# Patient Record
Sex: MALE | Race: WHITE | NOT HISPANIC OR LATINO | Employment: OTHER | URBAN - METROPOLITAN AREA
[De-identification: names, ages, dates, MRNs, and addresses within clinical notes are randomized per-mention and may not be internally consistent; named-entity substitution may affect disease eponyms.]

---

## 2017-07-24 ENCOUNTER — OFFICE VISIT (OUTPATIENT)
Dept: UROLOGY | Facility: CLINIC | Age: 77
End: 2017-07-24
Payer: MEDICARE

## 2017-07-24 VITALS
HEIGHT: 66 IN | SYSTOLIC BLOOD PRESSURE: 100 MMHG | DIASTOLIC BLOOD PRESSURE: 68 MMHG | HEART RATE: 70 BPM | BODY MASS INDEX: 34.58 KG/M2 | WEIGHT: 215.19 LBS

## 2017-07-24 DIAGNOSIS — R35.1 NOCTURIA MORE THAN TWICE PER NIGHT: ICD-10-CM

## 2017-07-24 DIAGNOSIS — R33.9 INCOMPLETE EMPTYING OF BLADDER: ICD-10-CM

## 2017-07-24 DIAGNOSIS — N40.1 BPH WITH OBSTRUCTION/LOWER URINARY TRACT SYMPTOMS: Primary | ICD-10-CM

## 2017-07-24 DIAGNOSIS — N13.8 BPH WITH OBSTRUCTION/LOWER URINARY TRACT SYMPTOMS: Primary | ICD-10-CM

## 2017-07-24 PROCEDURE — 99213 OFFICE O/P EST LOW 20 MIN: CPT | Mod: S$GLB,,, | Performed by: UROLOGY

## 2017-07-24 PROCEDURE — 99999 PR PBB SHADOW E&M-EST. PATIENT-LVL III: CPT | Mod: PBBFAC,,, | Performed by: UROLOGY

## 2017-07-24 PROCEDURE — 1126F AMNT PAIN NOTED NONE PRSNT: CPT | Mod: S$GLB,,, | Performed by: UROLOGY

## 2017-07-24 PROCEDURE — 1159F MED LIST DOCD IN RCRD: CPT | Mod: S$GLB,,, | Performed by: UROLOGY

## 2017-07-24 RX ORDER — LOSARTAN POTASSIUM 25 MG/1
25 TABLET ORAL DAILY
COMMUNITY
End: 2018-06-19

## 2017-07-24 RX ORDER — METOPROLOL SUCCINATE 25 MG/1
12.5 TABLET, EXTENDED RELEASE ORAL DAILY
COMMUNITY
Start: 2017-04-30 | End: 2021-02-10

## 2017-07-24 NOTE — PROGRESS NOTES
Subjective:       Patient ID: Geronimo Gu is a 76 y.o. male who was last seen in this office Visit date not found    Chief Complaint:   Chief Complaint   Patient presents with    Benign Prostatic Hypertrophy     annual visit       History of Present Illness  Benign Prostatic Hypertrophy  Patient complains of lower urinary tract symptoms. He reports nocturia one time a night. He denies straining, urgency and weak stream. Patient states symptoms are of mild severity. Onset of symptoms was several years ago and was gradual in onset. He has no personal history and no family history of prostate cancer. He reports a history of no complicating symptoms. He denies flank pain, gross hematuria, kidney stones and recurrent UTI.  He is currently taking Flomax and Proscar.    ACTIVE MEDICAL ISSUES:  There is no problem list on file for this patient.      ALLERGIES AND MEDICATIONS: updated and reviewed.  Review of patient's allergies indicates:   Allergen Reactions    Pcn [penicillins]      Current Outpatient Prescriptions   Medication Sig    aspirin (ECOTRIN) 81 MG EC tablet Take 81 mg by mouth once daily.    co-enzyme Q-10 30 mg capsule Take 30 mg by mouth 3 (three) times daily.    finasteride (PROSCAR) 5 mg tablet TAKE 1 TABLET ONE TIME DAILY    losartan (COZAAR) 25 MG tablet Take 25 mg by mouth once daily.    metoprolol succinate (TOPROL-XL) 25 MG 24 hr tablet     ranitidine (ZANTAC) 300 MG tablet Take 300 mg by mouth every evening.    tamsulosin (FLOMAX) 0.4 mg Cp24 TAKE 1 CAPSULE ONE TIME DAILY     No current facility-administered medications for this visit.        Review of Systems   Constitutional: Negative for activity change, fatigue, fever and unexpected weight change.   HENT: Negative for congestion.    Eyes: Negative for redness.   Respiratory: Negative for chest tightness and shortness of breath.    Cardiovascular: Negative for chest pain and leg swelling.   Gastrointestinal: Negative for abdominal  "pain, constipation, diarrhea, nausea and vomiting.   Genitourinary: Negative for dysuria, flank pain, frequency, hematuria, penile pain, penile swelling, scrotal swelling, testicular pain and urgency.   Musculoskeletal: Negative for arthralgias and back pain.   Neurological: Negative for dizziness and light-headedness.   Psychiatric/Behavioral: Negative for behavioral problems and confusion. The patient is not nervous/anxious.    All other systems reviewed and are negative.      Objective:      Vitals:    07/24/17 1347   BP: 100/68   Pulse: 70   Weight: 97.6 kg (215 lb 2.7 oz)   Height: 5' 6" (1.676 m)     Physical Exam   Nursing note and vitals reviewed.  Constitutional: He is oriented to person, place, and time. He appears well-developed and well-nourished.   HENT:   Head: Normocephalic.   Eyes: Conjunctivae are normal.   Neck: Normal range of motion. Neck supple. No tracheal deviation present. No thyromegaly present.   Cardiovascular: Normal rate and normal heart sounds.    Pulmonary/Chest: Effort normal and breath sounds normal. No respiratory distress. He has no wheezes.   Abdominal: Soft. Bowel sounds are normal. He exhibits no distension and no mass. There is no hepatosplenomegaly. There is no tenderness. There is no rebound, no guarding and no CVA tenderness. No hernia. Hernia confirmed negative in the right inguinal area and confirmed negative in the left inguinal area.   Genitourinary: Rectum normal, testes normal and penis normal. Rectal exam shows no external hemorrhoid, no mass and no tenderness. Prostate is enlarged. Prostate is not tender. Right testis shows no mass and no tenderness. Left testis shows no mass and no tenderness.       Musculoskeletal: Normal range of motion. He exhibits no edema or tenderness.   Lymphadenopathy:     He has no cervical adenopathy. No inguinal adenopathy noted on the right or left side.   Neurological: He is alert and oriented to person, place, and time.   Skin: Skin is " warm and dry. No rash noted. No erythema.     Psychiatric: He has a normal mood and affect. His behavior is normal. Judgment and thought content normal.       Urine dipstick shows negative for all components.  Micro exam: negative for WBC's or RBC's.    Assessment:       1. BPH with obstruction/lower urinary tract symptoms    2. Nocturia more than twice per night    3. Incomplete emptying of bladder          Plan:       1. BPH with obstruction/lower urinary tract symptoms  Stay on Flomax and Proscar.  He wants to continue prostate cancer screening.    - Prostate Specific Antigen, Diagnostic; Future  - Prostate Specific Antigen, Diagnostic    2. Nocturia more than twice per night      3. Incomplete emptying of bladder              Return in about 1 year (around 7/24/2018) for Follow up.

## 2017-08-01 ENCOUNTER — HOSPITAL ENCOUNTER (EMERGENCY)
Facility: OTHER | Age: 77
Discharge: HOME | End: 2017-08-01
Attending: EMERGENCY MEDICINE
Payer: MEDICARE

## 2017-08-01 ENCOUNTER — OFFICE VISIT (OUTPATIENT)
Dept: UROLOGY | Facility: CLINIC | Age: 77
End: 2017-08-01
Payer: MEDICARE

## 2017-08-01 VITALS
DIASTOLIC BLOOD PRESSURE: 72 MMHG | WEIGHT: 215.19 LBS | HEIGHT: 66 IN | BODY MASS INDEX: 34.58 KG/M2 | SYSTOLIC BLOOD PRESSURE: 138 MMHG

## 2017-08-01 VITALS
SYSTOLIC BLOOD PRESSURE: 154 MMHG | TEMPERATURE: 98 F | HEART RATE: 74 BPM | BODY MASS INDEX: 34.74 KG/M2 | OXYGEN SATURATION: 98 % | HEIGHT: 66 IN | WEIGHT: 216.19 LBS | DIASTOLIC BLOOD PRESSURE: 76 MMHG | RESPIRATION RATE: 16 BRPM

## 2017-08-01 DIAGNOSIS — N20.0 KIDNEY STONE ON LEFT SIDE: Primary | ICD-10-CM

## 2017-08-01 DIAGNOSIS — N28.9 RENAL INSUFFICIENCY: ICD-10-CM

## 2017-08-01 DIAGNOSIS — K59.00 CONSTIPATION, UNSPECIFIED CONSTIPATION TYPE: ICD-10-CM

## 2017-08-01 DIAGNOSIS — N23 RENAL COLIC ON LEFT SIDE: ICD-10-CM

## 2017-08-01 DIAGNOSIS — N20.1 URETERAL STONE: Primary | ICD-10-CM

## 2017-08-01 DIAGNOSIS — Q62.11 HYDRONEPHROSIS WITH URETEROPELVIC JUNCTION (UPJ) OBSTRUCTION: ICD-10-CM

## 2017-08-01 LAB
ALBUMIN SERPL-MCNC: 3.7 G/DL (ref 3.3–5.5)
ALBUMIN SERPL-MCNC: 4.1 G/DL (ref 3.3–5.5)
ALP SERPL-CCNC: 58 U/L (ref 42–141)
ALP SERPL-CCNC: 66 U/L (ref 42–141)
BILIRUB SERPL-MCNC: 0.9 MG/DL (ref 0.2–1.6)
BILIRUB SERPL-MCNC: 0.9 MG/DL (ref 0.2–1.6)
BILIRUB SERPL-MCNC: NORMAL MG/DL
BILIRUBIN, POC UA: NEGATIVE
BLOOD URINE, POC: 250
BLOOD, POC UA: ABNORMAL
BUN SERPL-MCNC: 19 MG/DL (ref 7–22)
CALCIUM SERPL-MCNC: 9.1 MG/DL (ref 8–10.3)
CHLORIDE SERPL-SCNC: 105 MMOL/L (ref 98–108)
CLARITY, POC UA: CLEAR
COLOR, POC UA: YELLOW
COLOR, POC UA: YELLOW
CREAT SERPL-MCNC: 1.6 MG/DL (ref 0.6–1.2)
GLUCOSE SERPL-MCNC: 106 MG/DL (ref 73–118)
GLUCOSE UR QL STRIP: NORMAL
GLUCOSE, POC UA: NEGATIVE
KETONES UR QL STRIP: NORMAL
KETONES, POC UA: NEGATIVE
LEUKOCYTE EST, POC UA: NEGATIVE
LEUKOCYTE ESTERASE URINE, POC: NORMAL
NITRITE, POC UA: NEGATIVE
NITRITE, POC UA: NORMAL
PH UR STRIP: 6 [PH]
PH, POC UA: NORMAL
POC ALT (SGPT): 18 U/L (ref 10–47)
POC ALT (SGPT): 20 U/L (ref 10–47)
POC AMYLASE: 76 U/L (ref 14–97)
POC AST (SGOT): 35 U/L (ref 11–38)
POC AST (SGOT): 36 U/L (ref 11–38)
POC GGT: 16 U/L (ref 5–65)
POC TCO2: 24 MMOL/L (ref 18–33)
POTASSIUM BLD-SCNC: 4.4 MMOL/L (ref 3.6–5.1)
PROTEIN, POC UA: ABNORMAL
PROTEIN, POC: 6.9 G/DL (ref 6.4–8.1)
PROTEIN, POC: 6.9 G/DL (ref 6.4–8.1)
PROTEIN, POC: NORMAL
SODIUM BLD-SCNC: 139 MMOL/L (ref 128–145)
SPECIFIC GRAVITY, POC UA: >=1.03
SPECIFIC GRAVITY, POC UA: NORMAL
UROBILINOGEN, POC UA: 0.2 E.U./DL
UROBILINOGEN, POC UA: NORMAL

## 2017-08-01 PROCEDURE — 63600175 PHARM REV CODE 636 W HCPCS: Performed by: EMERGENCY MEDICINE

## 2017-08-01 PROCEDURE — 99999 PR PBB SHADOW E&M-EST. PATIENT-LVL III: CPT | Mod: PBBFAC,,, | Performed by: NURSE PRACTITIONER

## 2017-08-01 PROCEDURE — 99284 EMERGENCY DEPT VISIT MOD MDM: CPT | Mod: 25

## 2017-08-01 PROCEDURE — 81001 URINALYSIS AUTO W/SCOPE: CPT | Mod: S$GLB,,, | Performed by: NURSE PRACTITIONER

## 2017-08-01 PROCEDURE — 96372 THER/PROPH/DIAG INJ SC/IM: CPT

## 2017-08-01 PROCEDURE — 99214 OFFICE O/P EST MOD 30 MIN: CPT | Mod: 25,S$GLB,, | Performed by: NURSE PRACTITIONER

## 2017-08-01 PROCEDURE — 96375 TX/PRO/DX INJ NEW DRUG ADDON: CPT

## 2017-08-01 PROCEDURE — 25000003 PHARM REV CODE 250: Performed by: EMERGENCY MEDICINE

## 2017-08-01 PROCEDURE — 87086 URINE CULTURE/COLONY COUNT: CPT

## 2017-08-01 PROCEDURE — 96374 THER/PROPH/DIAG INJ IV PUSH: CPT

## 2017-08-01 PROCEDURE — 80053 COMPREHEN METABOLIC PANEL: CPT

## 2017-08-01 PROCEDURE — 81003 URINALYSIS AUTO W/O SCOPE: CPT

## 2017-08-01 PROCEDURE — 85025 COMPLETE CBC W/AUTO DIFF WBC: CPT

## 2017-08-01 PROCEDURE — 96361 HYDRATE IV INFUSION ADD-ON: CPT

## 2017-08-01 PROCEDURE — 1159F MED LIST DOCD IN RCRD: CPT | Mod: S$GLB,,, | Performed by: NURSE PRACTITIONER

## 2017-08-01 PROCEDURE — 82150 ASSAY OF AMYLASE: CPT

## 2017-08-01 RX ORDER — CIPROFLOXACIN 2 MG/ML
400 INJECTION, SOLUTION INTRAVENOUS
Status: CANCELLED | OUTPATIENT
Start: 2017-08-01

## 2017-08-01 RX ORDER — DICYCLOMINE HYDROCHLORIDE 10 MG/ML
20 INJECTION INTRAMUSCULAR
Status: COMPLETED | OUTPATIENT
Start: 2017-08-01 | End: 2017-08-01

## 2017-08-01 RX ORDER — HYDROMORPHONE HYDROCHLORIDE 2 MG/ML
0.5 INJECTION, SOLUTION INTRAMUSCULAR; INTRAVENOUS; SUBCUTANEOUS
Status: COMPLETED | OUTPATIENT
Start: 2017-08-01 | End: 2017-08-01

## 2017-08-01 RX ORDER — AMLODIPINE BESYLATE 5 MG/1
TABLET ORAL
COMMUNITY
Start: 2017-04-30 | End: 2017-08-03

## 2017-08-01 RX ORDER — ONDANSETRON 2 MG/ML
4 INJECTION INTRAMUSCULAR; INTRAVENOUS
Status: COMPLETED | OUTPATIENT
Start: 2017-08-01 | End: 2017-08-01

## 2017-08-01 RX ORDER — TAMSULOSIN HYDROCHLORIDE 0.4 MG/1
0.4 CAPSULE ORAL
Status: COMPLETED | OUTPATIENT
Start: 2017-08-01 | End: 2017-08-01

## 2017-08-01 RX ORDER — ALBUTEROL SULFATE 90 UG/1
AEROSOL, METERED RESPIRATORY (INHALATION)
COMMUNITY
Start: 2017-06-01 | End: 2021-01-04

## 2017-08-01 RX ORDER — ONDANSETRON 4 MG/1
4 TABLET, ORALLY DISINTEGRATING ORAL EVERY 8 HOURS PRN
Qty: 14 TABLET | Refills: 1 | Status: SHIPPED | OUTPATIENT
Start: 2017-08-01 | End: 2017-12-12

## 2017-08-01 RX ORDER — OXYCODONE AND ACETAMINOPHEN 5; 325 MG/1; MG/1
1 TABLET ORAL EVERY 4 HOURS PRN
Qty: 15 TABLET | Refills: 0 | Status: SHIPPED | OUTPATIENT
Start: 2017-08-01 | End: 2017-12-12

## 2017-08-01 RX ADMIN — HYDROMORPHONE HYDROCHLORIDE 0.5 MG: 2 INJECTION INTRAMUSCULAR; INTRAVENOUS; SUBCUTANEOUS at 12:08

## 2017-08-01 RX ADMIN — TAMSULOSIN HYDROCHLORIDE 0.4 MG: 0.4 CAPSULE ORAL at 11:08

## 2017-08-01 RX ADMIN — ONDANSETRON 4 MG: 2 INJECTION INTRAMUSCULAR; INTRAVENOUS at 10:08

## 2017-08-01 RX ADMIN — DICYCLOMINE HYDROCHLORIDE 20 MG: 20 INJECTION, SOLUTION INTRAMUSCULAR at 10:08

## 2017-08-01 RX ADMIN — SODIUM CHLORIDE 1000 ML: 0.9 INJECTION, SOLUTION INTRAVENOUS at 11:08

## 2017-08-01 RX ADMIN — ONDANSETRON 4 MG: 2 INJECTION INTRAMUSCULAR; INTRAVENOUS at 12:08

## 2017-08-01 NOTE — ED TRIAGE NOTES
C/o upper abd pain/nausea/constipation x2 days. Reports left flank pain 3 days ago with vomiting, reports pain resolved upon waking the next morning. Denies dysuria/hematuria/fever/chills.

## 2017-08-01 NOTE — DISCHARGE INSTRUCTIONS
Rest.  Drink plenty of fluids.  Strain your urine.  If symptoms worsen go to the [facility] emergency department on [facility].  Follow-up with Dr. Martinez this week

## 2017-08-01 NOTE — PROGRESS NOTES
"Subjective:       Patient ID: Geronimo Gu is a 76 y.o. male who was last seen in this office 7/24/2017    Chief Complaint:   Chief Complaint   Patient presents with    Follow-up     patient is here for ER follow up - kidney stones       Urolithiasis  Patient presented to ED today with c/o (1/10) intermittent sharp abdominal pain. Pain does not radiate. Patient states the pain began last night and was sudden in onset. He also reports several episodes of n/v. He states that he used a Ducolax suppository because he believed the pain was d/t constipation though he had a small BM yesterday morning.  He reports having L flank pain and urinary frequency  two nights ago but states ssx resolved without intervention so he believed he "passed a stone"  He has hx of kidney stones x 14 years ago which he passed.    Ct scan in ED showed 5 mm stone at the left UPJ with moderated left hydronephrosis. Cr 1.6, baseline Cr unknown. Patient was given Dilaudid and Zofran. He was also given rx for Percocet and Zofram in ED.    Patient denies flank/abdominal pain, dysuria, hematuria, frequency, urgency, or n/v at this time.  Risk factors for urolithiasis: history of stone disease and poor fluid intake.        ACTIVE MEDICAL ISSUES:There is no problem list on file for this patient.      ALLERGIES AND MEDICATIONS: updated and reviewed.  Review of patient's allergies indicates:   Allergen Reactions    Pcn [penicillins] Hives     Current Outpatient Prescriptions   Medication Sig    amlodipine (NORVASC) 5 MG tablet     aspirin (ECOTRIN) 81 MG EC tablet Take 81 mg by mouth once daily.    co-enzyme Q-10 30 mg capsule Take 30 mg by mouth 3 (three) times daily.    finasteride (PROSCAR) 5 mg tablet TAKE 1 TABLET ONE TIME DAILY    losartan (COZAAR) 25 MG tablet Take 25 mg by mouth once daily.    metoprolol succinate (TOPROL-XL) 25 MG 24 hr tablet     ondansetron (ZOFRAN-ODT) 4 MG TbDL Take 1 tablet (4 mg total) by mouth every 8 (eight) " "hours as needed.    oxycodone-acetaminophen (PERCOCET) 5-325 mg per tablet Take 1 tablet by mouth every 4 (four) hours as needed for Pain.    ranitidine (ZANTAC) 300 MG tablet Take 300 mg by mouth every evening.    tamsulosin (FLOMAX) 0.4 mg Cp24 TAKE 1 CAPSULE ONE TIME DAILY    VENTOLIN HFA 90 mcg/actuation inhaler      No current facility-administered medications for this visit.        Review of Systems   Constitutional: Negative for activity change, chills, fatigue, fever and unexpected weight change.   Eyes: Negative for discharge, redness and visual disturbance.   Respiratory: Negative for cough, shortness of breath and wheezing.    Cardiovascular: Negative for chest pain and leg swelling.   Gastrointestinal: Negative for abdominal distention, abdominal pain, constipation, diarrhea, nausea and vomiting.   Genitourinary: Negative for difficulty urinating, dysuria, flank pain, frequency, hematuria, testicular pain and urgency.   Musculoskeletal: Negative for arthralgias, joint swelling and myalgias.   Skin: Negative for color change and rash.   Neurological: Negative for dizziness and light-headedness.   Psychiatric/Behavioral: Negative for behavioral problems and confusion. The patient is not nervous/anxious.        Objective:      Vitals:    08/01/17 1402   BP: 138/72   Weight: 97.6 kg (215 lb 2.7 oz)   Height: 5' 6" (1.676 m)     Physical Exam   Constitutional: He is oriented to person, place, and time. He appears well-developed.   HENT:   Head: Normocephalic and atraumatic.   Nose: Nose normal.   Eyes: Conjunctivae are normal. Right eye exhibits no discharge. Left eye exhibits no discharge.   Neck: Normal range of motion. Neck supple. No tracheal deviation present. No thyromegaly present.   Cardiovascular: Normal rate and regular rhythm.    Pulmonary/Chest: Effort normal. No respiratory distress. He has no wheezes.   Abdominal: Soft. He exhibits no distension. There is no hepatosplenomegaly. There is no " tenderness. There is no CVA tenderness. No hernia.   Genitourinary:   Genitourinary Comments: Patient declined exam   Musculoskeletal: Normal range of motion. He exhibits no edema.   Neurological: He is alert and oriented to person, place, and time.   Skin: Skin is warm and dry. No rash noted. No erythema.     Psychiatric: He has a normal mood and affect. His behavior is normal. Judgment normal.       Urine dipstick shows 250 RBCs.      Narrative     CT of the abdomen and pelvis was performed without the use of oral intravenous contrast.  Axial sagittal and coronal reformatted images submitted.    No prior film for comparison    Findings:  the lungs demonstrate some atelectasis or fibrosis at the bases.  No significant pleural fluid.    In the abdomen the liver, gallbladder, pancreas, and spleen are unremarkable.  No convincing adrenal or renal masses.  There is moderate left hydronephrosis and an approximate 5 mm calcification at the left UPJ.  More distal ureter is unremarkable.  Right renal collecting system and ureter likewise unremarkable.  No stones in the bladder.    The aorta tapers normally.  Some calcified plaque noted.  No significant para-aortic or pelvic adenopathy.  Bladder is decompressed.  Prostate is not enlarged considering his age.    Visualized loops of bowel demonstrates scattered diverticula in the colon.  No inflammatory changes.  Appendix appears unremarkable.  No significant small bowel dilatation.  Small hiatal hernia noted.    Bone windows demonstrate degenerative change.  No lytic or blastic lesions.    Impression there is moderate left hydronephrosis and an obstructing 5 mm stone at the left UPJ.      Electronically signed by: SABRA ZAMBRANO MD  Date: 08/01/17  Time: 11:48          Assessment:       1. Kidney stone on left side    2. Hydronephrosis with ureteropelvic junction (UPJ) obstruction          Plan:       1. Kidney stone on left side  -5mm stone L UPJ, patient symptomatic would  like to proceed with intervention  -URS per Dr. Martinez 8/4/17  -Strain urine, strainer provided to patient  -Continue percocet prn pain, zofran prn n/v  -Adequate hydration  -Patient instructed on return precautions (fever, pain unresponsive to pain meds, severe n/v, or significant hematuria)  -Patient instructed to stop ASA now    2. Hydronephrosis with ureteropelvic junction (UPJ) obstruction              Return for 2 weeks post op ureteroscopy.

## 2017-08-01 NOTE — ED PROVIDER NOTES
"Encounter Date: 8/1/2017       History     Chief Complaint   Patient presents with    Abdominal Pain    Constipation    Nausea     Chief complaint: Abdominal pain    76-year-old who began having sharp and crampy pain to his mid abdomen yesterday around 6 PM.  The pain has been intermittent.  It worsens when he touches it.  Currently patient rates his pain as 1 out of 10 but it was worse at home.  Patient reports 6 episodes of nausea and vomiting.  He said he was vomiting bile but now is only "dry heaving".  Patient's last bowel movement was yesterday morning.  He said that it was very small.  He is not passing flatus.  He denies abdominal surgeries in the past.  Patient said that the pain kept him up all night.  He took one naproxen last night.  He also used a  Dulcolax suppository without improvement.  No fever but he has had chills.  He has a history kidney stones and said that he felt like he passed a kidney stone 2 nights ago.  He had left flank pain.  He denies left flank pain at this time.          Review of patient's allergies indicates:   Allergen Reactions    Pcn [penicillins] Hives     Past Medical History:   Diagnosis Date    Hypertension     Hypertrophy of prostate without urinary obstruction and other lower urinary tract symptoms (LUTS)     Impotence of organic origin     Nocturia     PAD (peripheral artery disease)      Past Surgical History:   Procedure Laterality Date    CAROTID ENDARTERECTOMY      CARPAL TUNNEL RELEASE      CYSTOSCOPY      rotator cuff surgery      TONSILLECTOMY       Family History   Problem Relation Age of Onset    Family history unknown: Yes     Social History   Substance Use Topics    Smoking status: Never Smoker    Smokeless tobacco: Never Used    Alcohol use Yes     Review of Systems   Constitutional: Positive for chills. Negative for fever.   HENT: Negative for sore throat.    Respiratory: Negative for shortness of breath.    Cardiovascular: Negative for " chest pain.   Gastrointestinal: Positive for abdominal pain, constipation, nausea and vomiting.   Genitourinary: Negative for decreased urine volume and dysuria.   Musculoskeletal: Negative for back pain.   Skin: Negative for rash.   Neurological: Negative for weakness.   Hematological: Does not bruise/bleed easily.   Psychiatric/Behavioral: Positive for sleep disturbance.       Physical Exam     Initial Vitals [08/01/17 0959]   BP Pulse Resp Temp SpO2   (!) 178/76 68 18 98.4 °F (36.9 °C) 98 %      MAP       110         Physical Exam    Constitutional: He appears well-developed and well-nourished.   HENT:   Head: Normocephalic and atraumatic.   Eyes: EOM are normal. Pupils are equal, round, and reactive to light.   Neck: Neck supple.   Cardiovascular: Normal rate, regular rhythm and normal heart sounds.   Pulmonary/Chest: Breath sounds normal.   Abdominal: Soft. He exhibits distension (mild). There is tenderness in the left lower quadrant. There is no rebound and no guarding.   Musculoskeletal: Normal range of motion. He exhibits no edema or tenderness.   Neurological: He is alert and oriented to person, place, and time. No cranial nerve deficit.   Skin: Skin is warm and dry.   Psychiatric: He has a normal mood and affect.         ED Course   Procedures  Labs Reviewed   POCT URINALYSIS W/O SCOPE   POCT CBC   POCT CMP   POCT AMYLASE             Medical Decision Making:   Initial Assessment:   76-year-old who complains of mid abdominal pain.  On exam patient does have mild tenderness to his left lower abdomen.  He is in no acute distress.  His blood pressure is elevated but he does take blood pressure medicine at home.  He said he was able to keep this down today.  Clinical Tests:   Lab Tests: Ordered and Reviewed       <> Summary of Lab: Creatinine 1.6, urine 2+ blood  Radiological Study: Ordered and Reviewed  ED Management:  CT of abdomen and pelvis was done.  This was significant for a 5 mm left-sided kidney stone  with hydronephrosis at the UPJ.  Patient's creatinine was 1.6.  I do not have a baseline to compare.  I discussed the patient CT findings and his labs with his urologist, Dr. Smalls.  He felt comfortable that the patient to be discharged home and follow-up with him this week.  Patient was encouraged to drink plenty of fluids and to return should symptoms worsen.   CT of the abdomen and pelvis will be done with and without contrast secondary to the history of kidney stones.  Patient be given Bentyl for pain.                   ED Course     Clinical Impression:   The primary encounter diagnosis was Kidney stone on left side. Diagnoses of Renal colic on left side, Renal insufficiency, and Constipation, unspecified constipation type were also pertinent to this visit.                           Diamond Bustillos MD  08/01/17 8843

## 2017-08-02 LAB — BACTERIA UR CULT: NO GROWTH

## 2017-08-03 ENCOUNTER — ANESTHESIA EVENT (OUTPATIENT)
Dept: SURGERY | Facility: HOSPITAL | Age: 77
End: 2017-08-03
Payer: MEDICARE

## 2017-08-03 ENCOUNTER — HOSPITAL ENCOUNTER (OUTPATIENT)
Dept: PREADMISSION TESTING | Facility: HOSPITAL | Age: 77
Discharge: HOME | End: 2017-08-03
Attending: UROLOGY
Payer: MEDICARE

## 2017-08-03 VITALS
WEIGHT: 219.38 LBS | HEART RATE: 53 BPM | SYSTOLIC BLOOD PRESSURE: 116 MMHG | TEMPERATURE: 97 F | HEIGHT: 66 IN | OXYGEN SATURATION: 96 % | RESPIRATION RATE: 17 BRPM | BODY MASS INDEX: 35.26 KG/M2 | DIASTOLIC BLOOD PRESSURE: 57 MMHG

## 2017-08-03 DIAGNOSIS — Z01.818 PRE-OP TESTING: Primary | ICD-10-CM

## 2017-08-03 PROCEDURE — 93005 ELECTROCARDIOGRAM TRACING: CPT

## 2017-08-03 NOTE — DISCHARGE INSTRUCTIONS
"Pre-operative instructions, medication directives and pain scales reviewed with patient. All questions the patient had  were answered. Re-assurance about surgical procedure and day of surgery routine given as needed. The patient verbalized understanding of the pre-op instructions.      Your surgery is scheduled for _Friday Aug 4, 2017___________________.    Call [phone] between 2 p.m. and 5 p.m. on   __Today_____________ to find out your arrival time for the day of your surgery.      Please report to SAME DAY SURGERY UNIT on the 2nd FLOOR at _______ a.m.  Use front door entrance. The doors open at 0530 am.      If you need WHEELCHAIR assistance please call  [phone] from your cell phone or "0"  from the  hospital courtesy phone located to the right after you enter the hospital lobby.      INSTRUCTIONS IMPORTANT!!!  ¨ Do not eat or drink after 12 midnight-including water. OK to brush teeth, no   gum, candy or mints!    ¨ Take only these medicines with a small swallow of water-morning of surgery.    Take Metoprolol am of surgery as instructed,              _x___  Prep instructions:   SHOWER    _x___  No powder, lotions or creams to your body.  _x___  You may wear only deodorant on the day of surgery.  __x__  Please remove all jewelry, including piercings and leave at home.  _x___  No money or valuables needed. Please leave at home.  You may bring your cell phone.  _x___  Please bring any documents given by your doctor.  _x___  If going home the same day, arrange for a ride home. You will not be able to   drive if Anesthesia was used.    _x___  Wear loose fitting clothing. Allow for dressings, bandages.  _x__  Stop Aspirin, Ibuprofen, Motrin and Aleve at least 3-5 days before surgery, unless otherwise instructed by your doctor, or the nurse.              You MAY use Tylenol/acetaminophen until day of surgery.  _x___  Call MD for temperature above 101 degrees.        _x___ Stop taking any Fish Oil supplement or any " Vitamins that contain Vitamin E at least 5 days prior to surgery.          I have read or had read and explained to me, and understand the above information.  Additional comments or instructions:Please call   [phone] if you have any questions regarding the instructions above.

## 2017-08-04 ENCOUNTER — ANESTHESIA (OUTPATIENT)
Dept: SURGERY | Facility: HOSPITAL | Age: 77
End: 2017-08-04
Payer: MEDICARE

## 2017-08-04 ENCOUNTER — TELEPHONE (OUTPATIENT)
Dept: UROLOGY | Facility: HOSPITAL | Age: 77
End: 2017-08-04

## 2017-08-04 ENCOUNTER — SURGERY (OUTPATIENT)
Age: 77
End: 2017-08-04

## 2017-08-04 ENCOUNTER — HOSPITAL ENCOUNTER (OUTPATIENT)
Facility: HOSPITAL | Age: 77
Discharge: HOME | End: 2017-08-04
Attending: UROLOGY | Admitting: UROLOGY
Payer: MEDICARE

## 2017-08-04 VITALS
WEIGHT: 219.31 LBS | HEART RATE: 56 BPM | OXYGEN SATURATION: 98 % | SYSTOLIC BLOOD PRESSURE: 166 MMHG | TEMPERATURE: 98 F | RESPIRATION RATE: 16 BRPM | BODY MASS INDEX: 35.25 KG/M2 | DIASTOLIC BLOOD PRESSURE: 81 MMHG | HEIGHT: 66 IN

## 2017-08-04 DIAGNOSIS — N20.1 URETERAL STONE: Primary | ICD-10-CM

## 2017-08-04 PROCEDURE — D9220A PRA ANESTHESIA: Mod: ANES,,, | Performed by: ANESTHESIOLOGY

## 2017-08-04 PROCEDURE — 88300 SURGICAL PATH GROSS: CPT | Mod: 26,,, | Performed by: PATHOLOGY

## 2017-08-04 PROCEDURE — C1894 INTRO/SHEATH, NON-LASER: HCPCS | Performed by: UROLOGY

## 2017-08-04 PROCEDURE — D9220A PRA ANESTHESIA: Mod: CRNA,,, | Performed by: NURSE ANESTHETIST, CERTIFIED REGISTERED

## 2017-08-04 PROCEDURE — 27201423 OPTIME MED/SURG SUP & DEVICES STERILE SUPPLY: Performed by: UROLOGY

## 2017-08-04 PROCEDURE — 52356 CYSTO/URETERO W/LITHOTRIPSY: CPT | Mod: LT,,, | Performed by: UROLOGY

## 2017-08-04 PROCEDURE — 25000003 PHARM REV CODE 250: Performed by: ANESTHESIOLOGY

## 2017-08-04 PROCEDURE — 37000009 HC ANESTHESIA EA ADD 15 MINS: Performed by: UROLOGY

## 2017-08-04 PROCEDURE — 71000015 HC POSTOP RECOV 1ST HR: Performed by: UROLOGY

## 2017-08-04 PROCEDURE — C2617 STENT, NON-COR, TEM W/O DEL: HCPCS | Performed by: UROLOGY

## 2017-08-04 PROCEDURE — 76000 FLUOROSCOPY <1 HR PHYS/QHP: CPT | Mod: 26,59,, | Performed by: UROLOGY

## 2017-08-04 PROCEDURE — 74420 UROGRAPHY RTRGR +-KUB: CPT | Mod: 26,,, | Performed by: UROLOGY

## 2017-08-04 PROCEDURE — C1773 RET DEV, INSERTABLE: HCPCS | Performed by: UROLOGY

## 2017-08-04 PROCEDURE — 63600175 PHARM REV CODE 636 W HCPCS: Performed by: NURSE ANESTHETIST, CERTIFIED REGISTERED

## 2017-08-04 PROCEDURE — 71000016 HC POSTOP RECOV ADDL HR: Performed by: UROLOGY

## 2017-08-04 PROCEDURE — C1769 GUIDE WIRE: HCPCS | Performed by: UROLOGY

## 2017-08-04 PROCEDURE — 25000003 PHARM REV CODE 250: Performed by: NURSE ANESTHETIST, CERTIFIED REGISTERED

## 2017-08-04 PROCEDURE — 71000033 HC RECOVERY, INTIAL HOUR: Performed by: UROLOGY

## 2017-08-04 PROCEDURE — 88300 SURGICAL PATH GROSS: CPT | Performed by: PATHOLOGY

## 2017-08-04 PROCEDURE — 27200651 HC AIRWAY, LMA: Performed by: NURSE ANESTHETIST, CERTIFIED REGISTERED

## 2017-08-04 PROCEDURE — 37000008 HC ANESTHESIA 1ST 15 MINUTES: Performed by: UROLOGY

## 2017-08-04 PROCEDURE — C1758 CATHETER, URETERAL: HCPCS | Performed by: UROLOGY

## 2017-08-04 PROCEDURE — 82365 CALCULUS SPECTROSCOPY: CPT

## 2017-08-04 PROCEDURE — 63600175 PHARM REV CODE 636 W HCPCS: Performed by: UROLOGY

## 2017-08-04 PROCEDURE — 36000707: Performed by: UROLOGY

## 2017-08-04 PROCEDURE — 36000706: Performed by: UROLOGY

## 2017-08-04 DEVICE — STENT URET PERCUFLEX 6FR 26CM: Type: IMPLANTABLE DEVICE | Site: URETER | Status: FUNCTIONAL

## 2017-08-04 RX ORDER — SODIUM CHLORIDE, SODIUM LACTATE, POTASSIUM CHLORIDE, CALCIUM CHLORIDE 600; 310; 30; 20 MG/100ML; MG/100ML; MG/100ML; MG/100ML
INJECTION, SOLUTION INTRAVENOUS CONTINUOUS
Status: DISCONTINUED | OUTPATIENT
Start: 2017-08-04 | End: 2017-08-04 | Stop reason: HOSPADM

## 2017-08-04 RX ORDER — SODIUM CHLORIDE 0.9 % (FLUSH) 0.9 %
3 SYRINGE (ML) INJECTION
Status: DISCONTINUED | OUTPATIENT
Start: 2017-08-04 | End: 2017-08-04 | Stop reason: HOSPADM

## 2017-08-04 RX ORDER — FENTANYL CITRATE 50 UG/ML
INJECTION, SOLUTION INTRAMUSCULAR; INTRAVENOUS
Status: DISCONTINUED | OUTPATIENT
Start: 2017-08-04 | End: 2017-08-04

## 2017-08-04 RX ORDER — LIDOCAINE HCL/PF 100 MG/5ML
SYRINGE (ML) INTRAVENOUS
Status: DISCONTINUED | OUTPATIENT
Start: 2017-08-04 | End: 2017-08-04

## 2017-08-04 RX ORDER — PHENAZOPYRIDINE HYDROCHLORIDE 200 MG/1
200 TABLET, FILM COATED ORAL 3 TIMES DAILY PRN
Qty: 30 TABLET | Refills: 0 | Status: SHIPPED | OUTPATIENT
Start: 2017-08-04 | End: 2017-09-07 | Stop reason: SDUPTHER

## 2017-08-04 RX ORDER — HYDROCODONE BITARTRATE AND ACETAMINOPHEN 5; 325 MG/1; MG/1
1 TABLET ORAL EVERY 4 HOURS PRN
Status: DISCONTINUED | OUTPATIENT
Start: 2017-08-04 | End: 2017-08-04 | Stop reason: HOSPADM

## 2017-08-04 RX ORDER — PHENYLEPHRINE HYDROCHLORIDE 10 MG/ML
INJECTION INTRAVENOUS
Status: DISCONTINUED | OUTPATIENT
Start: 2017-08-04 | End: 2017-08-04

## 2017-08-04 RX ORDER — CIPROFLOXACIN 2 MG/ML
400 INJECTION, SOLUTION INTRAVENOUS
Status: COMPLETED | OUTPATIENT
Start: 2017-08-04 | End: 2017-08-04

## 2017-08-04 RX ORDER — PROPOFOL 10 MG/ML
VIAL (ML) INTRAVENOUS
Status: DISCONTINUED | OUTPATIENT
Start: 2017-08-04 | End: 2017-08-04

## 2017-08-04 RX ORDER — HYDROMORPHONE HYDROCHLORIDE 2 MG/ML
0.2 INJECTION, SOLUTION INTRAMUSCULAR; INTRAVENOUS; SUBCUTANEOUS EVERY 5 MIN PRN
Status: DISCONTINUED | OUTPATIENT
Start: 2017-08-04 | End: 2017-08-04 | Stop reason: HOSPADM

## 2017-08-04 RX ORDER — HYDROCODONE BITARTRATE AND ACETAMINOPHEN 5; 325 MG/1; MG/1
1 TABLET ORAL EVERY 6 HOURS PRN
Qty: 30 TABLET | Refills: 0 | Status: SHIPPED | OUTPATIENT
Start: 2017-08-04 | End: 2017-12-12

## 2017-08-04 RX ORDER — LIDOCAINE HYDROCHLORIDE 10 MG/ML
1 INJECTION, SOLUTION EPIDURAL; INFILTRATION; INTRACAUDAL; PERINEURAL ONCE
Status: DISCONTINUED | OUTPATIENT
Start: 2017-08-04 | End: 2017-08-04 | Stop reason: HOSPADM

## 2017-08-04 RX ORDER — OXYBUTYNIN CHLORIDE 5 MG/1
5 TABLET ORAL 3 TIMES DAILY
Qty: 90 TABLET | Refills: 0 | Status: SHIPPED | OUTPATIENT
Start: 2017-08-04 | End: 2017-12-12

## 2017-08-04 RX ORDER — GLYCOPYRROLATE 0.2 MG/ML
INJECTION INTRAMUSCULAR; INTRAVENOUS
Status: DISCONTINUED | OUTPATIENT
Start: 2017-08-04 | End: 2017-08-04

## 2017-08-04 RX ORDER — PHENAZOPYRIDINE HYDROCHLORIDE 100 MG/1
200 TABLET, FILM COATED ORAL ONCE
Status: DISCONTINUED | OUTPATIENT
Start: 2017-08-04 | End: 2017-08-04 | Stop reason: HOSPADM

## 2017-08-04 RX ORDER — ONDANSETRON 2 MG/ML
INJECTION INTRAMUSCULAR; INTRAVENOUS
Status: DISCONTINUED | OUTPATIENT
Start: 2017-08-04 | End: 2017-08-04

## 2017-08-04 RX ORDER — CIPROFLOXACIN 500 MG/1
500 TABLET ORAL 2 TIMES DAILY
Qty: 6 TABLET | Refills: 0 | Status: SHIPPED | OUTPATIENT
Start: 2017-08-04 | End: 2017-08-07

## 2017-08-04 RX ORDER — OXYBUTYNIN CHLORIDE 5 MG/1
5 TABLET ORAL 3 TIMES DAILY
Status: DISCONTINUED | OUTPATIENT
Start: 2017-08-04 | End: 2017-08-04 | Stop reason: HOSPADM

## 2017-08-04 RX ADMIN — PHENYLEPHRINE HYDROCHLORIDE 200 MCG: 10 INJECTION INTRAVENOUS at 11:08

## 2017-08-04 RX ADMIN — CIPROFLOXACIN 400 MG: 2 INJECTION, SOLUTION INTRAVENOUS at 10:08

## 2017-08-04 RX ADMIN — PROPOFOL 140 MG: 10 INJECTION, EMULSION INTRAVENOUS at 11:08

## 2017-08-04 RX ADMIN — LIDOCAINE HYDROCHLORIDE 100 MG: 20 INJECTION, SOLUTION INTRAVENOUS at 11:08

## 2017-08-04 RX ADMIN — FENTANYL CITRATE 25 MCG: 50 INJECTION INTRAMUSCULAR; INTRAVENOUS at 11:08

## 2017-08-04 RX ADMIN — GLYCOPYRROLATE 0.2 MG: 0.2 INJECTION, SOLUTION INTRAMUSCULAR; INTRAVENOUS at 11:08

## 2017-08-04 RX ADMIN — SODIUM CHLORIDE, SODIUM LACTATE, POTASSIUM CHLORIDE, AND CALCIUM CHLORIDE: .6; .31; .03; .02 INJECTION, SOLUTION INTRAVENOUS at 08:08

## 2017-08-04 RX ADMIN — SODIUM CHLORIDE, SODIUM LACTATE, POTASSIUM CHLORIDE, AND CALCIUM CHLORIDE: .6; .31; .03; .02 INJECTION, SOLUTION INTRAVENOUS at 07:08

## 2017-08-04 RX ADMIN — ONDANSETRON 4 MG: 2 INJECTION, SOLUTION INTRAMUSCULAR; INTRAVENOUS at 11:08

## 2017-08-04 NOTE — OP NOTE
DATE OF PROCEDURE:  08/04/2017    PREOPERATIVE DIAGNOSIS:  Left ureteral calculus.    POSTOPERATIVE DIAGNOSIS:  Left ureteral calculus.    PROCEDURES PERFORMED:  Cystoscopy with left retrograde pyelogram, left   ureteroscopy, laser lithotripsy, stone manipulation and extraction, left   ureteral stent placement, fluoroscopy and Yang catheter placement.    PRIMARY SURGEON:  Gama Martinez M.D.    ANESTHESIA:  General.    ESTIMATED BLOOD LOSS:  Minimal.    DRAINS:  A 6-Thai 26 cm double-J stent, no string and 16-Thai Yang   catheter.    SPECIMENS REMOVED:  Left ureteral stone.    FINDINGS:  An embedded proximal left ureteral stone.    INDICATIONS:  Geronimo Gu is a 76-year-old male with a history of left-sided   flank pain.  Imaging showed left-sided ureteral stone.  It was recommended that   he undergo ureteroscopy for clearance of the stone.    PROCEDURE IN DETAIL:  Geronimo Gu was taken to the Operating Room where he   was positively identified by erika.  He was placed supine on the operating   room table.  Following induction of adequate general anesthesia, he was placed   in the dorsal lithotomy position and his external genitalia were prepped and   draped in the usual sterile fashion.    After confirmation of preoperative antibiotics, preoperative marking on the left   side and a preoperative timeout, a  film was taken using fluoroscopy.    I then passed a 21-Thai rigid cystoscope per urethra into the bladder under   direct vision.  No urethral lesions seen.  No bladder lesions seen.    I then passed a 5-Thai open-ended catheter through the scope.  I intubated the   left ureteral orifice.  Retrograde pyelogram was taken.  Contrast was seen   filling the ureter up to the proximal ureter where a filling defect was seen   consistent with the proximal ureteral stone and a small amount of contrast was   able to negotiate past that area into the renal collecting system where    hydronephrosis was noted.    I then passed a 0.035 inch Bentson wire through the open-ended catheter, which   passed up beyond the stone to the level of the kidney without difficulty.  I   withdrew the scope and the catheter, leaving the wire in place for safety.    I then reintroduced the cystoscope and passed the second wire alongside the   first up to the level of the kidney.  I then withdrew the scope, leaving the   wire in place.    I then advanced a 12/14-Kuwaiti ureteral access sheath starting with the   obturator first for dilation and then with the sheath and obturator together,   which was passed under live fluoroscopy up to the level of the mid ureter.    I then withdrew the obturator and wire leaving the sheath in place.    I then advanced a digital flexible ureteroscope through the sheath into the mid   ureter.  The scope was then advanced further up to the level of the ureteral   stone, which was visualized.  It was seen to be embedded in the wall of the   ureter.  He had edema noted around the stone consistent with longstanding   location of the stone in this location.    I then used a 200 micron holmium laser fiber to fragment the stone into smaller   pieces.  During the fragmentation, the stone was able to be popped out into the   ureteral lumen.    I then used a 0 tip Nitinol Basket to withdraw the pieces of the ureter.    I then carefully advanced the scope beyond that area into the level of the   kidney.  The kidney was inspected.  There was some debris noted consistent with   some old blood clot that was resolving.  There was no evidence of any other   stones, no tumors seen.    The scope was then brought back out into the ureter beyond the location where   the stone was located.  I then withdrew the sheath and scope together under   direct vision.  There was no evidence of any injury upon sheath placement.    I then advanced a 6-Ku[place]i 26 cm double-J stent over the wire under live    fluoroscopy up deploying a coil within the left kidney and a coil within the   bladder, confirmed on fluoroscopy.    I then advanced a 16-Setswana Yang catheter for postoperative drainage.    The anesthesia was then reversed.  He was taken to Recovery Room in stable   condition.      MICHAEL  dd: 08/04/2017 12:02:17 (CDT)  td: 08/04/2017 12:57:35 (CDT)  Doc ID   #[id number]  Job ID #[id number]    CC:

## 2017-08-04 NOTE — BRIEF OP NOTE
Surgery Date: 8/4/2017    Preoperative Dx:Left Ureteral Stone    Postoperative Dx:Left Ureteral Stone    Surgeon: AUREA Martinez MD    Anesthesia: General    Procedure:Cystoscopy, left retrograde pyelogram, left ureteroscopy, laser lithotripsy, stone extraction, left ureteral stent placement, fluoroscopy, romano placement    Findings/Key Components:embedded stone in proximal ureter    Estimated Blood Loss: minimal         Specimens Removed:   Specimen (12h ago through future)    Start     Ordered    08/04/17 1153  Specimen to Pathology - Surgery  Once     Comments:  Left kidney stones      08/04/17 1153          Drains:6x26 JJ stent, no string. 16 Fr Romano

## 2017-08-04 NOTE — ANESTHESIA POSTPROCEDURE EVALUATION
"Anesthesia Post Evaluation    Patient: Geronimo Gu    Procedure(s) Performed: Procedure(s) (LRB):  CYSTOSCOPY, RETROGRADE, URETEROSCOPY, LEFT STENT PLACEMEN (Left)    Final Anesthesia Type: general  Patient location during evaluation: PACU  Patient participation: Yes- Able to Participate  Level of consciousness: awake and alert and oriented  Post-procedure vital signs: reviewed and stable  Pain management: adequate  Airway patency: patent  PONV status at discharge: No PONV  Anesthetic complications: no      Cardiovascular status: blood pressure returned to baseline and hemodynamically stable  Respiratory status: unassisted, spontaneous ventilation and room air  Hydration status: euvolemic  Follow-up not needed.        Visit Vitals  /75   Pulse 71   Temp 36.6 °C (97.8 °F) (Oral)   Resp 20   Ht 5' 6" (1.676 m)   Wt 99.5 kg (219 lb 5 oz)   SpO2 95%   BMI 35.40 kg/m²       Pain/Bel Score: Pain Assessment Performed: Yes (8/4/2017 12:52 PM)  Presence of Pain: denies (8/4/2017 12:52 PM)  Bel Score: 10 (8/4/2017 12:52 PM)      "

## 2017-08-04 NOTE — DISCHARGE INSTRUCTIONS
***  BATHING/DRESSING:  ? Ok to shower tomorrow    ACTIVITY LEVEL: If you have received sedation or an anesthetic, you may feel sleepy for several hours. Rest until you are more awake. Gradually resume your normal activities.    No heavy lifting.      DIET: You may resume your home diet. If nausea is present, increase your diet gradually with fluids and bland foods.  Medications: Pain medication should be taken only if needed and as directed. If antibiotics are prescribed, the medication should be taken until completed. You will be given an updated list of you medications.  ? No driving, alcoholic beverages or signing legal documents for next 24 hours or while taking pain medication    CALL THE DOCTOR:    For any obvious bleeding (some dried blood over the incision is normal).    Some blood in your urine is normal.     Redness, swelling, foul smell around incision or fever over 101.   Shortness of breath, Coughing Up Bloody Sputum, or Pains or Swelling in your Calves..   Persistent pain or nausea not relieved by medication.   Problems urinating - unable to urinate or heavy bleeding (with our without clots) in urine.    If any unusual problems or difficulties occur contact your doctor. If you cannot contact your doctor but feel your signs and symptoms warrant a physicians attention return to the emergency room.  Fall Prevention  Millions of people fall every year and injure themselves. You may have had anesthesia or sedation which may increase your risk of falling. You may have health issues that put you at an increased risk of falling.     Here are ways to reduce your risk of falling.  ·   · Make your home safe by keeping walkways clear of objects you may trip over.  · Use non-slip pads under rugs. Do not use area rugs or small throw rugs.  · Use non-slip mats in bathtubs and showers.  · Install handrails and lights on staircases.  · Do not walk in poorly lit areas.  · Do not stand on chairs or wobbly  ladders.  · Use caution when reaching overhead or looking upward. This position can cause a loss of balance.  · Be sure your shoes fit properly, have non-slip bottoms and are in good condition.   · Wear shoes both inside and out. Avoid going barefoot or wearing slippers.  · Be cautious when going up and down stairs, curbs, and when walking on uneven sidewalks.  · If your balance is poor, consider using a cane or walker.  · If your fall was related to alcohol use, stop or limit alcohol intake.   · If your fall was related to use of sleeping medicines, talk to your doctor about this. You may need to reduce your dosage at bedtime if you awaken during the night to go to the bathroom.    · To reduce the need for nighttime bathroom trips:  ¨ Avoid drinking fluids for several hours before going to bed  ¨ Empty your bladder before going to bed  ¨ Men can keep a urinal at the bedside  · Stay as active as you can. Balance, flexibility, strength, and endurance all come from exercise. They all play a role in preventing falls. Ask your healthcare provider which types of activity are right for you.  · Get your vision checked on a regular basis.  · If you have pets, know where they are before you stand up or walk so you don't trip over them.  · Use night lights.

## 2017-08-04 NOTE — TRANSFER OF CARE
"Anesthesia Transfer of Care Note    Patient: Geronimo Gu    Procedure(s) Performed: Procedure(s) (LRB):  CYSTOSCOPY, RETROGRADE, URETEROSCOPY, LEFT STENT PLACEMEN (Left)    Patient location: PACU    Anesthesia Type: general    Transport from OR: Transported from OR on room air with adequate spontaneous ventilation    Post pain: adequate analgesia    Post assessment: no apparent anesthetic complications    Post vital signs: stable    Level of consciousness: awake    Nausea/Vomiting: no nausea/vomiting    Complications: none    Transfer of care protocol was followed      Last vitals:   Visit Vitals  BP (!) 150/74 (BP Location: Left arm, Patient Position: Lying, BP Method: Automatic)   Pulse 91   Temp 36.9 °C (98.4 °F) (Oral)   Resp 18   Ht 5' 6" (1.676 m)   Wt 99.5 kg (219 lb 5 oz)   SpO2 100%   BMI 35.40 kg/m²     "

## 2017-08-04 NOTE — TELEPHONE ENCOUNTER
MrTre Brittanei's Follow up in about 1 month needs to include an office cystoscopy with left stent removal.  Order placed.

## 2017-08-04 NOTE — ANESTHESIA PREPROCEDURE EVALUATION
08/04/2017  Geronimo Gu is a 76 y.o., male.    Anesthesia Evaluation    I have reviewed the Patient Summary Reports.    I have reviewed the Nursing Notes.      Review of Systems  Anesthesia Hx:  No problems with previous Anesthesia    Social:  Former Smoker    Cardiovascular:   Exercise tolerance: good Denies Pacemaker. Hypertension  Denies Valvular problems/Murmurs.  Denies MI.  Denies CAD.    Denies CABG/stent.  Denies Dysrhythmias.   Denies Angina.             PVD    Pulmonary:   Denies Pneumonia Denies COPD.  Denies Asthma.  Denies Shortness of breath.  Denies Recent URI. Pt has occassional bronchitis but his breathing is at baseline today   Renal/:   BPH    Hepatic/GI:   GERD, well controlled    Neurological:  Neurology Normal    Endocrine:  Endocrine Normal        Physical Exam  General:  Obesity    Airway/Jaw/Neck:   M2  Good mouth opening  Denies loose dentition  Short thick neck somewhat limits ROM          Mental Status:  Mental Status Findings: Normal        Anesthesia Plan  Type of Anesthesia, risks & benefits discussed:  Anesthesia Type:  general  Patient's Preference:   Intra-op Monitoring Plan:   Intra-op Monitoring Plan Comments:   Post Op Pain Control Plan:   Post Op Pain Control Plan Comments:   Induction:   IV  Beta Blocker:  Patient is on a Beta-Blocker and has received one dose within the past 24 hours (No further documentation required).       Informed Consent: Patient understands risks and agrees with Anesthesia plan.  Questions answered. Anesthesia consent signed with patient.  ASA Score: 2     Day of Surgery Review of History & Physical: I have interviewed and examined the patient. I have reviewed the patient's H&P dated:    H&P update referred to the surgeon.     Anesthesia Plan Notes: npo        Ready For Surgery From Anesthesia Perspective.

## 2017-08-04 NOTE — DISCHARGE SUMMARY
[facility]  Discharge Note  Short Stay    Admit Date: 8/4/2017    Discharge Date and Time: 08/04/2017 12:02 PM       Attending Physician: SIMONE Martinez MD     Discharge Provider: AUREA Martinez    Diagnoses:  Active Hospital Problems    Diagnosis  POA    *Ureteral stone [N20.1]  Yes      Resolved Hospital Problems    Diagnosis Date Resolved POA   No resolved problems to display.       Discharged Condition: stable    Hospital Course: Patient was admitted for an outpatient procedure and tolerated the procedure well with no complications.    Final Diagnoses: Same as principal problem.    Disposition: Home or Self Care    Follow up/Patient Instructions:    Medications:  Reconciled Home Medications:   Current Discharge Medication List      START taking these medications    Details   ciprofloxacin HCl (CIPRO) 500 MG tablet Take 1 tablet (500 mg total) by mouth 2 (two) times daily.  Qty: 6 tablet, Refills: 0    Associated Diagnoses: Ureteral stone      hydrocodone-acetaminophen 5-325mg (NORCO) 5-325 mg per tablet Take 1 tablet by mouth every 6 (six) hours as needed for Pain.  Qty: 30 tablet, Refills: 0    Associated Diagnoses: Ureteral stone      oxybutynin (DITROPAN) 5 MG Tab Take 1 tablet (5 mg total) by mouth 3 (three) times daily.  Qty: 90 tablet, Refills: 0    Associated Diagnoses: Ureteral stone      phenazopyridine (PYRIDIUM) 200 MG tablet Take 1 tablet (200 mg total) by mouth 3 (three) times daily as needed for Pain (Burning).  Qty: 30 tablet, Refills: 0    Associated Diagnoses: Ureteral stone         CONTINUE these medications which have NOT CHANGED    Details   finasteride (PROSCAR) 5 mg tablet TAKE 1 TABLET ONE TIME DAILY  Qty: 90 tablet, Refills: 3    Associated Diagnoses: BPH without urinary obstruction      losartan (COZAAR) 25 MG tablet Take 25 mg by mouth once daily.      metoprolol succinate (TOPROL-XL) 25 MG 24 hr tablet 12.5 mg once daily.       ondansetron (ZOFRAN-ODT) 4 MG TbDL Take 1  tablet (4 mg total) by mouth every 8 (eight) hours as needed.  Qty: 14 tablet, Refills: 1      oxycodone-acetaminophen (PERCOCET) 5-325 mg per tablet Take 1 tablet by mouth every 4 (four) hours as needed for Pain.  Qty: 15 tablet, Refills: 0      ranitidine (ZANTAC) 300 MG tablet Take 300 mg by mouth every evening.      tamsulosin (FLOMAX) 0.4 mg Cp24 TAKE 1 CAPSULE ONE TIME DAILY  Qty: 90 capsule, Refills: 3    Associated Diagnoses: BPH without urinary obstruction      aspirin (ECOTRIN) 81 MG EC tablet Take 81 mg by mouth once daily.      co-enzyme Q-10 30 mg capsule Take 30 mg by mouth 3 (three) times daily.      VENTOLIN HFA 90 mcg/actuation inhaler              Discharge Procedure Orders  Diet general     Activity as tolerated     Call MD for:   Order Comments: Significant Hematuria       Follow-up Information     W Edwar Martinez MD. Schedule an appointment as soon as possible for a visit in 3 weeks.    Specialty:  Urology  Why:  cystoscopy stent removal  Contact information:  [address] [place] [id number] [phone]                   Discharge Procedure Orders (must include Diet, Follow-up, Activity):    Discharge Procedure Orders (must include Diet, Follow-up, Activity)  Diet general     Activity as tolerated     Call MD for:   Order Comments: Significant Hematuria

## 2017-08-08 ENCOUNTER — TELEPHONE (OUTPATIENT)
Dept: UROLOGY | Facility: CLINIC | Age: 77
End: 2017-08-08

## 2017-08-08 NOTE — TELEPHONE ENCOUNTER
----- Message from Myah Fuentes sent at 8/8/2017  3:05 PM CDT -----  Contact: Self  Pt calling to speak to a nurse regarding his surgery that he had Friday. Please call [phone].

## 2017-08-08 NOTE — TELEPHONE ENCOUNTER
Restart ASA when urine is clear/light pink    He may have some periodic hematuria while stent is in place.    Drink more water    No restrictions/limitations

## 2017-08-08 NOTE — TELEPHONE ENCOUNTER
Patient would like to know when he can:  1.) resume asa  2.) what limitations does he have post op if any  3.) urine is still dark red- passed 3 small clots yesterday- some flank pain when urinating - is this normal?    Please advise

## 2017-08-11 ENCOUNTER — TELEPHONE (OUTPATIENT)
Dept: UROLOGY | Facility: CLINIC | Age: 77
End: 2017-08-11

## 2017-08-11 NOTE — TELEPHONE ENCOUNTER
Patient states that he is having multiple side effects from the ditropan, one of which is serve dry mouth. Pt instructed to hold off on medication for now until MD can be notified. Pt warned that s/s may increase, however, if the medication side effects are intolerable he can hold until provider is notified- please advise

## 2017-08-11 NOTE — TELEPHONE ENCOUNTER
----- Message from Caitlin Aldana sent at 8/11/2017 11:28 AM CDT -----  Contact: [phone]  Pt is requesting a call back in regards to medication Please call pt at your earliest convenience.  Thanks !

## 2017-08-12 LAB
ANNOTATION COMMENT IMP: NORMAL
COMPN STONE: NORMAL
SPECIMEN SOURCE: NORMAL
STONE ANALYSIS IR-IMP: NORMAL

## 2017-08-14 NOTE — TELEPHONE ENCOUNTER
The Oxybutynin is only as needed so he can cut back on the medication and hopefully the side effects will be less.

## 2017-09-07 ENCOUNTER — TELEPHONE (OUTPATIENT)
Dept: UROLOGY | Facility: CLINIC | Age: 77
End: 2017-09-07

## 2017-09-07 ENCOUNTER — PROCEDURE VISIT (OUTPATIENT)
Dept: UROLOGY | Facility: CLINIC | Age: 77
End: 2017-09-07
Payer: MEDICARE

## 2017-09-07 VITALS
HEIGHT: 66 IN | RESPIRATION RATE: 16 BRPM | BODY MASS INDEX: 33.98 KG/M2 | TEMPERATURE: 99 F | SYSTOLIC BLOOD PRESSURE: 120 MMHG | DIASTOLIC BLOOD PRESSURE: 68 MMHG | WEIGHT: 211.44 LBS | HEART RATE: 60 BPM

## 2017-09-07 DIAGNOSIS — N20.1 URETERAL STONE: Primary | ICD-10-CM

## 2017-09-07 PROCEDURE — 52310 CYSTOSCOPY AND TREATMENT: CPT | Mod: S$GLB,,, | Performed by: UROLOGY

## 2017-09-07 RX ORDER — CIPROFLOXACIN 500 MG/1
500 TABLET ORAL ONCE
Qty: 1 TABLET | Refills: 0 | Status: SHIPPED | OUTPATIENT
Start: 2017-09-07 | End: 2017-09-07

## 2017-09-07 RX ORDER — PHENAZOPYRIDINE HYDROCHLORIDE 200 MG/1
200 TABLET, FILM COATED ORAL 3 TIMES DAILY PRN
Qty: 12 TABLET | Refills: 0 | Status: SHIPPED | OUTPATIENT
Start: 2017-09-07 | End: 2017-12-12

## 2017-09-07 NOTE — PROCEDURES
"Cystoscopy  Date/Time: 9/7/2017 4:26 PM  Performed by: SIMONE OJEDA  Authorized by: SIMONE OJEDA     Consent Done?:  Yes (Written)  Time out: Immediately prior to procedure a "time out" was called to verify the correct patient, procedure, equipment, support staff and site/side marked as required.    Indications comment:  Ureteral stone  Position:  Supine  Anesthesia:  Intraurethral instillation  Patient sedated?: No    Preparation: Patient was prepped and draped in usual sterile fashion      Scope type:  Flexible cystoscope  Stent inserted: No    Stent removed: Yes    Stent encrusted: No    External exam normal: Yes    Digital exam performed: No    Urethra normal: Yes    Prostate normal: No          Hyperplasia (Bilobar)(Length (cm):  5)Bladder neck normal: Bladder neck normal   Bladder normal: Yes      Patient tolerance:  Patient tolerated the procedure well with no immediate complications      "

## 2017-09-07 NOTE — TELEPHONE ENCOUNTER
----- Message from Mariluz Holbrook sent at 9/7/2017 12:38 PM CDT -----  Contact: self  Pt would like to speak with nurse about his procedure that's scheduled for today. Please contact him at [phone].    Thanks

## 2017-09-07 NOTE — TELEPHONE ENCOUNTER
Patient wanted to know how the stent would today- procedure explained to him- patient verbalized understanding.

## 2017-10-13 DIAGNOSIS — N40.0 BPH WITHOUT URINARY OBSTRUCTION: ICD-10-CM

## 2017-10-13 RX ORDER — TAMSULOSIN HYDROCHLORIDE 0.4 MG/1
CAPSULE ORAL
Qty: 90 CAPSULE | Refills: 3 | Status: SHIPPED | OUTPATIENT
Start: 2017-10-13 | End: 2018-06-19

## 2017-12-04 DIAGNOSIS — N40.0 BPH WITHOUT URINARY OBSTRUCTION: ICD-10-CM

## 2017-12-04 RX ORDER — FINASTERIDE 5 MG/1
TABLET, FILM COATED ORAL
Qty: 90 TABLET | Refills: 3 | Status: SHIPPED | OUTPATIENT
Start: 2017-12-04 | End: 2018-12-20 | Stop reason: SDUPTHER

## 2017-12-06 ENCOUNTER — HOSPITAL ENCOUNTER (OUTPATIENT)
Dept: RADIOLOGY | Facility: HOSPITAL | Age: 77
Discharge: HOME | End: 2017-12-06
Attending: UROLOGY
Payer: MEDICARE

## 2017-12-06 DIAGNOSIS — N20.1 URETERAL STONE: ICD-10-CM

## 2017-12-06 PROCEDURE — 76770 US EXAM ABDO BACK WALL COMP: CPT | Mod: 26,,, | Performed by: RADIOLOGY

## 2017-12-06 PROCEDURE — 76770 US EXAM ABDO BACK WALL COMP: CPT | Mod: TC

## 2017-12-12 ENCOUNTER — OFFICE VISIT (OUTPATIENT)
Dept: UROLOGY | Facility: CLINIC | Age: 77
End: 2017-12-12
Payer: MEDICARE

## 2017-12-12 VITALS
SYSTOLIC BLOOD PRESSURE: 130 MMHG | BODY MASS INDEX: 34.26 KG/M2 | HEIGHT: 67 IN | WEIGHT: 218.25 LBS | DIASTOLIC BLOOD PRESSURE: 70 MMHG | HEART RATE: 68 BPM

## 2017-12-12 DIAGNOSIS — N13.30 HYDRONEPHROSIS OF LEFT KIDNEY: ICD-10-CM

## 2017-12-12 DIAGNOSIS — N40.1 BPH WITH OBSTRUCTION/LOWER URINARY TRACT SYMPTOMS: ICD-10-CM

## 2017-12-12 DIAGNOSIS — N20.0 KIDNEY STONES: Primary | ICD-10-CM

## 2017-12-12 DIAGNOSIS — N13.8 BPH WITH OBSTRUCTION/LOWER URINARY TRACT SYMPTOMS: ICD-10-CM

## 2017-12-12 DIAGNOSIS — R35.1 NOCTURIA MORE THAN TWICE PER NIGHT: ICD-10-CM

## 2017-12-12 PROCEDURE — 99999 PR PBB SHADOW E&M-EST. PATIENT-LVL III: CPT | Mod: PBBFAC,,, | Performed by: UROLOGY

## 2017-12-12 PROCEDURE — 99214 OFFICE O/P EST MOD 30 MIN: CPT | Mod: S$GLB,,, | Performed by: UROLOGY

## 2017-12-12 RX ORDER — PATIROMER 8.4 G/1
POWDER, FOR SUSPENSION ORAL
Refills: 2 | COMMUNITY
Start: 2017-11-30 | End: 2020-10-05

## 2017-12-12 NOTE — PROGRESS NOTES
Subjective:       Patient ID: Geronimo Gu is a 77 y.o. male who was last seen in this office 9/7/2017    Chief Complaint:   Chief Complaint   Patient presents with    Benign Prostatic Hypertrophy     3 month f/u with ultrasound        History of Present Illness  Benign Prostatic Hypertrophy  Patient complains of lower urinary tract symptoms. He reports nocturia one time a night. He denies straining, urgency and weak stream. Patient states symptoms are of mild severity. Onset of symptoms was several years ago and was gradual in onset. He has no personal history and no family history of prostate cancer. He reports a history of no complicating symptoms. He denies flank pain, gross hematuria, kidney stones and recurrent UTI.  He is currently taking Flomax and Proscar.    Kidney Stones  He had a left proximal ureteral stone that was removed in August 2017.  His stent was removed in September.  He is back with an ultrasound.  He denies flank pain or hematuria or fever.        ACTIVE MEDICAL ISSUES:  Patient Active Problem List   Diagnosis    Ureteral stone       ALLERGIES AND MEDICATIONS: updated and reviewed.  Review of patient's allergies indicates:   Allergen Reactions    Pcn [penicillins] Hives     Current Outpatient Prescriptions   Medication Sig    aspirin (ECOTRIN) 81 MG EC tablet Take 81 mg by mouth once daily.    co-enzyme Q-10 30 mg capsule Take 30 mg by mouth 3 (three) times daily.    evolocumab (REPATHA SURECLICK) 140 mg/mL PnIj Inject into the skin.    finasteride (PROSCAR) 5 mg tablet TAKE 1 TABLET ONE TIME DAILY    losartan (COZAAR) 25 MG tablet Take 25 mg by mouth once daily.    metoprolol succinate (TOPROL-XL) 25 MG 24 hr tablet 12.5 mg once daily.     ranitidine (ZANTAC) 300 MG tablet Take 300 mg by mouth every evening.    tamsulosin (FLOMAX) 0.4 mg Cp24 TAKE 1 CAPSULE ONE TIME DAILY    VELTASSA 8.4 gram PwPk TK 1 PACKET AND MIX WITH 30ML OF WATER UNTIL FULLY DISSOLVED ADD ADDITIONAL 60  "ML AND TAKE PO THREE TIMES WEEKLY.    VENTOLIN HFA 90 mcg/actuation inhaler      No current facility-administered medications for this visit.        Review of Systems   Constitutional: Negative for activity change, fatigue, fever and unexpected weight change.   HENT: Negative for congestion.    Eyes: Negative for redness.   Respiratory: Negative for chest tightness and shortness of breath.    Cardiovascular: Negative for chest pain and leg swelling.   Gastrointestinal: Negative for abdominal pain, constipation, diarrhea, nausea and vomiting.   Genitourinary: Negative for dysuria, flank pain, frequency, hematuria, penile pain, penile swelling, scrotal swelling, testicular pain and urgency.   Musculoskeletal: Negative for arthralgias and back pain.   Neurological: Negative for dizziness and light-headedness.   Psychiatric/Behavioral: Negative for behavioral problems and confusion. The patient is not nervous/anxious.    All other systems reviewed and are negative.      Objective:      Vitals:    12/12/17 1552   BP: 130/70   Pulse: 68   Weight: 99 kg (218 lb 4.1 oz)   Height: 5' 7" (1.702 m)     Physical Exam   Nursing note and vitals reviewed.  Constitutional: He is oriented to person, place, and time. He appears well-developed and well-nourished.   HENT:   Head: Normocephalic.   Eyes: Conjunctivae are normal.   Neck: Normal range of motion. Neck supple. No tracheal deviation present. No thyromegaly present.   Cardiovascular: Normal rate and normal heart sounds.    Pulmonary/Chest: Effort normal and breath sounds normal. No respiratory distress. He has no wheezes.   Abdominal: Soft. Bowel sounds are normal. There is no hepatosplenomegaly. There is no tenderness. There is no rebound and no CVA tenderness. No hernia.   Musculoskeletal: Normal range of motion. He exhibits no edema or tenderness.   Lymphadenopathy:     He has no cervical adenopathy.   Neurological: He is alert and oriented to person, place, and time. "   Skin: Skin is warm and dry. No rash noted. No erythema.     Psychiatric: He has a normal mood and affect. His behavior is normal. Judgment and thought content normal.       Urine dipstick shows negative for all components.  Micro exam: negative for WBC's or RBC's.    US Retroperitoneal Complete (Kidney and   Status: Final result   MyChart Results Release     MyChart Status: Declined    PACS Images     Show images for US Retroperitoneal Complete (Kidney and   Reviewed By Kareem Martinez MD on 12/6/2017 10:14   External Result Report     External Result Report   Narrative     History: Left ureteral calculus.    Procedure: Multiple sonographic images of the kidneys are performed.    Comparison study: CT scan A./1/2000 1730    Findings:    The left kidney measures 13.1 x 6.1 x 4.6 cm with a resistive index of 0.76. There is mild-to-moderate pelvocaliectasis. No renal masses are noted. Although left ureteral jet is visualized in the urinary bladder a partially obstructing left ureteral calculus cannot be totally excluded. CT scan of the abdomen and pelvis without contrast suggested to evaluate for a partially obstructing left ureteral calculus.    Right kidney measures 12.6 x 5.8 x 4.1 cm with a resistive index of 0.72. There is no hydronephrosis or renal masses or renal calculi.    Urinary bladder is unremarkable. Ureteral jets were visualized bilaterally.    The prostate measures approximately 3.9 x 3.8 x 4 cm.   Impression         1. Mild left hydronephrosis as above. See recommendations above.      Electronically signed by: SHARITA MIXON MD  Date: 12/06/17  Time: 09:53     Encounter     View Encounter              Stone analysis: Calcium Oxalate    Assessment:       1. Kidney stones    2. Hydronephrosis of left kidney    3. BPH with obstruction/lower urinary tract symptoms    4. Nocturia more than twice per night          Plan:       1. Kidney stones    - US Retroperitoneal Complete (Kidney and;  Future    2. Hydronephrosis of left kidney  Monitor with [facility]ULLEN    3. BPH with obstruction/lower urinary tract symptoms  stable  - Prostate Specific Antigen, Diagnostic; Future    4. Nocturia more than twice per night  stable            Return in about 6 months (around 6/12/2018) for Follow up, Review X-ray, Review PSA.

## 2018-04-20 ENCOUNTER — HOSPITAL ENCOUNTER (OUTPATIENT)
Dept: RADIOLOGY | Facility: HOSPITAL | Age: 78
Discharge: HOME | End: 2018-04-20
Attending: UROLOGY
Payer: MEDICARE

## 2018-04-20 DIAGNOSIS — N20.0 KIDNEY STONES: ICD-10-CM

## 2018-04-20 PROCEDURE — 76770 US EXAM ABDO BACK WALL COMP: CPT | Mod: TC

## 2018-04-20 PROCEDURE — 76770 US EXAM ABDO BACK WALL COMP: CPT | Mod: 26,,, | Performed by: RADIOLOGY

## 2018-06-13 ENCOUNTER — LAB VISIT (OUTPATIENT)
Dept: LAB | Facility: HOSPITAL | Age: 78
End: 2018-06-13
Attending: UROLOGY
Payer: MEDICARE

## 2018-06-13 DIAGNOSIS — N13.8 BPH WITH OBSTRUCTION/LOWER URINARY TRACT SYMPTOMS: ICD-10-CM

## 2018-06-13 DIAGNOSIS — N40.1 BPH WITH OBSTRUCTION/LOWER URINARY TRACT SYMPTOMS: ICD-10-CM

## 2018-06-13 LAB — COMPLEXED PSA SERPL-MCNC: 0.58 NG/ML

## 2018-06-13 PROCEDURE — 84153 ASSAY OF PSA TOTAL: CPT

## 2018-06-13 PROCEDURE — 36415 COLL VENOUS BLD VENIPUNCTURE: CPT

## 2018-06-19 ENCOUNTER — OFFICE VISIT (OUTPATIENT)
Dept: UROLOGY | Facility: CLINIC | Age: 78
End: 2018-06-19
Payer: MEDICARE

## 2018-06-19 VITALS
HEIGHT: 67 IN | SYSTOLIC BLOOD PRESSURE: 122 MMHG | WEIGHT: 212.94 LBS | DIASTOLIC BLOOD PRESSURE: 72 MMHG | BODY MASS INDEX: 33.42 KG/M2 | HEART RATE: 60 BPM

## 2018-06-19 DIAGNOSIS — N20.0 KIDNEY STONES: ICD-10-CM

## 2018-06-19 DIAGNOSIS — N40.1 BPH WITH OBSTRUCTION/LOWER URINARY TRACT SYMPTOMS: Primary | ICD-10-CM

## 2018-06-19 DIAGNOSIS — R33.9 INCOMPLETE EMPTYING OF BLADDER: ICD-10-CM

## 2018-06-19 DIAGNOSIS — N13.8 BPH WITH OBSTRUCTION/LOWER URINARY TRACT SYMPTOMS: Primary | ICD-10-CM

## 2018-06-19 DIAGNOSIS — R35.1 NOCTURIA MORE THAN TWICE PER NIGHT: ICD-10-CM

## 2018-06-19 LAB
BILIRUB SERPL-MCNC: NORMAL MG/DL
BLOOD URINE, POC: NORMAL
COLOR, POC UA: YELLOW
GLUCOSE UR QL STRIP: NORMAL
KETONES UR QL STRIP: NORMAL
LEUKOCYTE ESTERASE URINE, POC: NORMAL
NITRITE, POC UA: NORMAL
PH, POC UA: 6
PROTEIN, POC: NORMAL
SPECIFIC GRAVITY, POC UA: 1030
UROBILINOGEN, POC UA: NORMAL

## 2018-06-19 PROCEDURE — 81001 URINALYSIS AUTO W/SCOPE: CPT | Mod: S$GLB,,, | Performed by: UROLOGY

## 2018-06-19 PROCEDURE — 99214 OFFICE O/P EST MOD 30 MIN: CPT | Mod: 25,S$GLB,, | Performed by: UROLOGY

## 2018-06-19 PROCEDURE — 99999 PR PBB SHADOW E&M-EST. PATIENT-LVL III: CPT | Mod: PBBFAC,,, | Performed by: UROLOGY

## 2018-06-19 RX ORDER — IBUPROFEN 800 MG/1
800 TABLET ORAL EVERY 6 HOURS PRN
COMMUNITY
End: 2020-10-05

## 2018-06-19 RX ORDER — GABAPENTIN 300 MG/1
300 CAPSULE ORAL 3 TIMES DAILY
COMMUNITY
End: 2020-10-05

## 2018-06-19 RX ORDER — AMLODIPINE BESYLATE 2.5 MG/1
2.5 TABLET ORAL DAILY
COMMUNITY
End: 2019-06-19

## 2018-06-19 RX ORDER — TAMSULOSIN HYDROCHLORIDE 0.4 MG/1
0.4 CAPSULE ORAL DAILY
COMMUNITY
End: 2018-10-27 | Stop reason: SDUPTHER

## 2018-06-19 NOTE — PROGRESS NOTES
Subjective:       Patient ID: Geronimo Gu is a 77 y.o. male who was last seen in this office Visit date not found    Chief Complaint:   Chief Complaint   Patient presents with    Nephrolithiasis     6 month f/u with ultrasound and psa     Benign Prostatic Hypertrophy       History of Present Illness  Benign Prostatic Hypertrophy  Patient complains of lower urinary tract symptoms. He reports nocturia one time a night. He denies straining, urgency and weak stream. Patient states symptoms are of mild severity. Onset of symptoms was several years ago and was gradual in onset. He has no personal history and no family history of prostate cancer. He reports a history of no complicating symptoms. He denies flank pain, gross hematuria, kidney stones and recurrent UTI.  He is currently taking Flomax and Proscar.    Kidney Stones  He had a left proximal ureteral stone that was removed in August 2017.  His stent was removed in September.  He is back with an ultrasound.  He denies flank pain or hematuria or fever.      ACTIVE MEDICAL ISSUES:  Patient Active Problem List   Diagnosis    Ureteral stone       ALLERGIES AND MEDICATIONS: updated and reviewed.  Review of patient's allergies indicates:   Allergen Reactions    Pcn [penicillins] Hives     Current Outpatient Prescriptions   Medication Sig    amLODIPine (NORVASC) 2.5 MG tablet Take 2.5 mg by mouth once daily.    aspirin (ECOTRIN) 81 MG EC tablet Take 81 mg by mouth once daily.    co-enzyme Q-10 30 mg capsule Take 30 mg by mouth 3 (three) times daily.    evolocumab (REPATHA SURECLICK) 140 mg/mL PnIj Inject into the skin.    finasteride (PROSCAR) 5 mg tablet TAKE 1 TABLET ONE TIME DAILY    gabapentin (NEURONTIN) 300 MG capsule Take 300 mg by mouth 3 (three) times daily.    ibuprofen (ADVIL,MOTRIN) 800 MG tablet Take 800 mg by mouth every 6 (six) hours as needed for Pain.    metoprolol succinate (TOPROL-XL) 25 MG 24 hr tablet 12.5 mg once daily.     ranitidine  "(ZANTAC) 300 MG tablet Take 300 mg by mouth every evening.    tamsulosin (FLOMAX) 0.4 mg Cp24 Take 0.4 mg by mouth once daily.    VELTASSA 8.4 gram PwPk TK 1 PACKET AND MIX WITH 30ML OF WATER UNTIL FULLY DISSOLVED ADD ADDITIONAL 60 ML AND TAKE PO THREE TIMES WEEKLY.    VENTOLIN HFA 90 mcg/actuation inhaler      No current facility-administered medications for this visit.        Review of Systems   Constitutional: Negative for activity change, fatigue, fever and unexpected weight change.   HENT: Negative for congestion.    Eyes: Negative for redness.   Respiratory: Negative for chest tightness and shortness of breath.    Cardiovascular: Negative for chest pain and leg swelling.   Gastrointestinal: Negative for abdominal pain, constipation, diarrhea, nausea and vomiting.   Genitourinary: Negative for dysuria, flank pain, frequency, hematuria, penile pain, penile swelling, scrotal swelling, testicular pain and urgency.   Musculoskeletal: Negative for arthralgias and back pain.   Neurological: Negative for dizziness and light-headedness.   Psychiatric/Behavioral: Negative for behavioral problems and confusion. The patient is not nervous/anxious.    All other systems reviewed and are negative.      Objective:      Vitals:    06/19/18 1340   BP: 122/72   Pulse: 60   Weight: 96.6 kg (212 lb 15.4 oz)   Height: 5' 7" (1.702 m)     Physical Exam   Nursing note and vitals reviewed.  Constitutional: He is oriented to person, place, and time. He appears well-developed and well-nourished.   HENT:   Head: Normocephalic.   Eyes: Conjunctivae are normal.   Neck: Normal range of motion. Neck supple. No tracheal deviation present. No thyromegaly present.   Cardiovascular: Normal rate and normal heart sounds.    Pulmonary/Chest: Effort normal and breath sounds normal. No respiratory distress. He has no wheezes.   Abdominal: Soft. Bowel sounds are normal. There is no hepatosplenomegaly. There is no tenderness. There is no rebound and " no CVA tenderness. No hernia.   Musculoskeletal: Normal range of motion. He exhibits no edema or tenderness.   Lymphadenopathy:     He has no cervical adenopathy.   Neurological: He is alert and oriented to person, place, and time.   Skin: Skin is warm and dry. No rash noted. No erythema.     Psychiatric: He has a normal mood and affect. His behavior is normal. Judgment and thought content normal.       Urine dipstick shows negative for all components.  Micro exam: negative for WBC's or RBC's.  US Retroperitoneal Complete (Kidney and   Status: Final result   MyChart Results Release     MyChart Status: Declined    PACS Images     Show images for US Retroperitoneal Complete (Kidney and   Reviewed By Kareem Martinez MD on 4/21/2018 09:41   External Result Report     External Result Report   Narrative     EXAMINATION:  US RETROPERITONEAL COMPLETE    CLINICAL HISTORY:  Calculus of kidney    TECHNIQUE:  Ultrasound of the kidneys and urinary bladder was performed including color flow and Doppler evaluation of the kidneys.    COMPARISON:  12/06/2017    FINDINGS:  Right kidney: The right kidney measures 12.0 x 5.3 x 4.6 cm. No cortical thinning. No loss of corticomedullary distinction. Resistive index measures 0.76, slightly elevated.  No mass. No renal stone. No hydronephrosis.    Left kidney: The left kidney measures 12.9 x 5.7 x 5.1 cm. No cortical thinning. No loss of corticomedullary distinction. Resistive index measures 0.68, normal.  No mass. No renal stone. Slight prominent renal pelvis    The bladder is distended  210 cc volume, postvoid residual is between 45 cc volume.  Ureteral jets noted bilaterally.  The prostate is only slightly a enlarged measuring 32 cc volume.   Impression       No significant change from the prior study, slight prominent left renal pelvis.      Electronically signed by: Ashli Crawford MD  Date: 04/21/2018  Time: 08:30    Encounter     View Encounter          Signed by      Signed Credentials Date/Time  Phone Pager   PONCHO ROGERS MD 4/21/2018 08:30 [phone]     PSA DIAGNOSTIC 0.00 - 4.00 ng/mL 0.58    Comment: PSA Expected levels:   Hormonal Therapy: <0.05 ng/ml   Prostatectomy: <0.01 ng/ml   Radiation Therapy: <1.00 ng/ml    Resulting Agency  [facility]   Narrative     6 months      Specimen Collected: 06/13/18 09:05   Last Resulted: 06/13/18 16:23          Assessment:       1. BPH with obstruction/lower urinary tract symptoms    2. Nocturia more than twice per night    3. Kidney stones    4. Incomplete emptying of bladder          Plan:       1. BPH with obstruction/lower urinary tract symptoms  Flomax and Proscar    - POCT urinalysis, dipstick or tablet reag  - Prostate Specific Antigen, Diagnostic; Future  - US Retroperitoneal Complete (Kidney and; Future    2. Nocturia more than twice per night  Limit evening fluids    3. Kidney stones  stable  - US Retroperitoneal Complete (Kidney and; Future    4. Incomplete emptying of bladder  improved            Follow-up in about 1 year (around 6/19/2019) for Follow up, Review X-ray, Review PSA.

## 2018-10-30 RX ORDER — TAMSULOSIN HYDROCHLORIDE 0.4 MG/1
CAPSULE ORAL
Qty: 90 CAPSULE | Refills: 3 | Status: SHIPPED | OUTPATIENT
Start: 2018-10-30 | End: 2019-10-28 | Stop reason: SDUPTHER

## 2018-12-20 DIAGNOSIS — N40.0 BPH WITHOUT URINARY OBSTRUCTION: ICD-10-CM

## 2018-12-20 RX ORDER — FINASTERIDE 5 MG/1
TABLET, FILM COATED ORAL
Qty: 90 TABLET | Refills: 3 | Status: SHIPPED | OUTPATIENT
Start: 2018-12-20 | End: 2019-12-14 | Stop reason: SDUPTHER

## 2019-05-07 ENCOUNTER — HOSPITAL ENCOUNTER (OUTPATIENT)
Dept: RADIOLOGY | Facility: HOSPITAL | Age: 79
Discharge: HOME | End: 2019-05-07
Attending: UROLOGY
Payer: MEDICARE

## 2019-05-07 DIAGNOSIS — N20.0 KIDNEY STONES: ICD-10-CM

## 2019-05-07 DIAGNOSIS — N13.8 BPH WITH OBSTRUCTION/LOWER URINARY TRACT SYMPTOMS: ICD-10-CM

## 2019-05-07 DIAGNOSIS — N40.1 BPH WITH OBSTRUCTION/LOWER URINARY TRACT SYMPTOMS: ICD-10-CM

## 2019-05-07 PROCEDURE — 76770 US EXAM ABDO BACK WALL COMP: CPT | Mod: TC

## 2019-05-07 PROCEDURE — 76770 US EXAM ABDO BACK WALL COMP: CPT | Mod: 26,,, | Performed by: RADIOLOGY

## 2019-05-07 PROCEDURE — 76770 US RETROPERITONEAL COMPLETE: ICD-10-PCS | Mod: 26,,, | Performed by: RADIOLOGY

## 2019-06-13 ENCOUNTER — TELEPHONE (OUTPATIENT)
Dept: UROLOGY | Facility: CLINIC | Age: 79
End: 2019-06-13

## 2019-06-13 NOTE — TELEPHONE ENCOUNTER
----- Message from Devorah Parmar sent at 6/13/2019  8:36 AM CDT -----  Contact: Self   Type: Patient Call Back    Who called: Self     What is the request in detail: patient is at [facility] and need lab orders sent to them   [facility] on [address] Fax: [phone]      Can the clinic reply by [facility]? No     Would the patient rather a call back or a response via [facility]? Call     Best call back number:[phone]    Additional Information:Please call patient when taken take of

## 2019-06-19 ENCOUNTER — OFFICE VISIT (OUTPATIENT)
Dept: UROLOGY | Facility: CLINIC | Age: 79
End: 2019-06-19
Payer: MEDICARE

## 2019-06-19 VITALS
SYSTOLIC BLOOD PRESSURE: 136 MMHG | WEIGHT: 190.25 LBS | HEIGHT: 67 IN | DIASTOLIC BLOOD PRESSURE: 82 MMHG | BODY MASS INDEX: 29.86 KG/M2

## 2019-06-19 DIAGNOSIS — R33.9 INCOMPLETE EMPTYING OF BLADDER: ICD-10-CM

## 2019-06-19 DIAGNOSIS — Z87.442 HISTORY OF KIDNEY STONES: ICD-10-CM

## 2019-06-19 DIAGNOSIS — N40.1 BPH WITH OBSTRUCTION/LOWER URINARY TRACT SYMPTOMS: Primary | ICD-10-CM

## 2019-06-19 DIAGNOSIS — R35.1 NOCTURIA MORE THAN TWICE PER NIGHT: ICD-10-CM

## 2019-06-19 DIAGNOSIS — N13.8 BPH WITH OBSTRUCTION/LOWER URINARY TRACT SYMPTOMS: Primary | ICD-10-CM

## 2019-06-19 LAB
BILIRUB SERPL-MCNC: NORMAL MG/DL
BLOOD URINE, POC: NORMAL
COLOR, POC UA: YELLOW
GLUCOSE UR QL STRIP: NORMAL
KETONES UR QL STRIP: NORMAL
LEUKOCYTE ESTERASE URINE, POC: NORMAL
NITRITE, POC UA: NORMAL
PH, POC UA: 5
PROTEIN, POC: NORMAL
SPECIFIC GRAVITY, POC UA: 1020
UROBILINOGEN, POC UA: NORMAL

## 2019-06-19 PROCEDURE — 99999 PR PBB SHADOW E&M-EST. PATIENT-LVL III: ICD-10-PCS | Mod: PBBFAC,,, | Performed by: UROLOGY

## 2019-06-19 PROCEDURE — 81001 PR  URINALYSIS, AUTO, W/SCOPE: ICD-10-PCS | Mod: S$GLB,,, | Performed by: UROLOGY

## 2019-06-19 PROCEDURE — 99214 PR OFFICE/OUTPT VISIT, EST, LEVL IV, 30-39 MIN: ICD-10-PCS | Mod: 25,S$GLB,, | Performed by: UROLOGY

## 2019-06-19 PROCEDURE — 99214 OFFICE O/P EST MOD 30 MIN: CPT | Mod: 25,S$GLB,, | Performed by: UROLOGY

## 2019-06-19 PROCEDURE — 81001 URINALYSIS AUTO W/SCOPE: CPT | Mod: S$GLB,,, | Performed by: UROLOGY

## 2019-06-19 PROCEDURE — 99999 PR PBB SHADOW E&M-EST. PATIENT-LVL III: CPT | Mod: PBBFAC,,, | Performed by: UROLOGY

## 2019-06-19 PROCEDURE — 1101F PT FALLS ASSESS-DOCD LE1/YR: CPT | Mod: CPTII,S$GLB,, | Performed by: UROLOGY

## 2019-06-19 PROCEDURE — 1101F PR PT FALLS ASSESS DOC 0-1 FALLS W/OUT INJ PAST YR: ICD-10-PCS | Mod: CPTII,S$GLB,, | Performed by: UROLOGY

## 2019-06-19 RX ORDER — AMLODIPINE BESYLATE 5 MG/1
5 TABLET ORAL EVERY MORNING
COMMUNITY
Start: 2019-06-07

## 2019-06-19 RX ORDER — HYDROCHLOROTHIAZIDE 25 MG/1
25 TABLET ORAL DAILY
Refills: 1 | COMMUNITY
Start: 2019-05-27 | End: 2021-05-12 | Stop reason: SDUPTHER

## 2019-06-19 NOTE — PROGRESS NOTES
Subjective:       Patient ID: Geronimo Gu is a 78 y.o. male who was last seen in this office Visit date not found    Chief Complaint:   Chief Complaint   Patient presents with    Annual Exam     Annual with PSA and Ultrasound results        History of Present Illness  Benign Prostatic Hypertrophy  Patient complains of lower urinary tract symptoms. He reports nocturia one time a night. He denies straining, urgency and weak stream. Patient states symptoms are of mild severity. Onset of symptoms was several years ago and was gradual in onset. He has no personal history and no family history of prostate cancer. He reports a history of no complicating symptoms. He denies flank pain, gross hematuria, kidney stones and recurrent UTI.  He is currently taking Flomax and Proscar.    Kidney Stones  He had a left proximal ureteral stone that was removed in August 2017.  His stent was removed in September.  He is back with an ultrasound.  He denies flank pain or hematuria or fever.        ACTIVE MEDICAL ISSUES:  Patient Active Problem List   Diagnosis    Ureteral stone       ALLERGIES AND MEDICATIONS: updated and reviewed.  Review of patient's allergies indicates:   Allergen Reactions    Pcn [penicillins] Hives    Statins-hmg-coa reductase inhibitors      Current Outpatient Medications   Medication Sig    amLODIPine (NORVASC) 5 MG tablet     aspirin (ECOTRIN) 81 MG EC tablet Take 81 mg by mouth once daily.    co-enzyme Q-10 30 mg capsule Take 30 mg by mouth 3 (three) times daily.    evolocumab (REPATHA SURECLICK) 140 mg/mL PnIj Inject into the skin.    finasteride (PROSCAR) 5 mg tablet TAKE 1 TABLET EVERY DAY    gabapentin (NEURONTIN) 300 MG capsule Take 300 mg by mouth 3 (three) times daily.    hydroCHLOROthiazide (HYDRODIURIL) 25 MG tablet     ibuprofen (ADVIL,MOTRIN) 800 MG tablet Take 800 mg by mouth every 6 (six) hours as needed for Pain.    metoprolol succinate (TOPROL-XL) 25 MG 24 hr tablet 12.5 mg once  "daily.     ranitidine (ZANTAC) 150 MG tablet     tamsulosin (FLOMAX) 0.4 mg Cap TAKE 1 CAPSULE EVERY DAY    VELTASSA 8.4 gram PwPk TK 1 PACKET AND MIX WITH 30ML OF WATER UNTIL FULLY DISSOLVED ADD ADDITIONAL 60 ML AND TAKE PO THREE TIMES WEEKLY.    VENTOLIN HFA 90 mcg/actuation inhaler      No current facility-administered medications for this visit.        Review of Systems   Constitutional: Negative for activity change, fatigue, fever and unexpected weight change.   HENT: Negative for congestion.    Eyes: Negative for redness.   Respiratory: Negative for chest tightness and shortness of breath.    Cardiovascular: Negative for chest pain and leg swelling.   Gastrointestinal: Negative for abdominal pain, constipation, diarrhea, nausea and vomiting.   Genitourinary: Negative for dysuria, flank pain, frequency, hematuria, penile pain, penile swelling, scrotal swelling, testicular pain and urgency.   Musculoskeletal: Negative for arthralgias and back pain.   Neurological: Negative for dizziness and light-headedness.   Psychiatric/Behavioral: Negative for behavioral problems and confusion. The patient is not nervous/anxious.    All other systems reviewed and are negative.      Objective:      Vitals:    06/19/19 1533   BP: 136/82   BP Location: Right arm   Patient Position: Sitting   BP Method: Medium (Manual)   Weight: 86.3 kg (190 lb 4.1 oz)   Height: 5' 7" (1.702 m)     Physical Exam   Nursing note and vitals reviewed.  Constitutional: He is oriented to person, place, and time. He appears well-developed and well-nourished.   HENT:   Head: Normocephalic.   Eyes: Conjunctivae are normal.   Neck: Normal range of motion. No tracheal deviation present. No thyromegaly present.   Cardiovascular: Normal rate and normal heart sounds.    Pulmonary/Chest: Effort normal and breath sounds normal. No respiratory distress. He has no wheezes.   Abdominal: Soft. He exhibits no distension and no mass. There is no " hepatosplenomegaly. There is no tenderness. There is no rebound, no guarding and no CVA tenderness. No hernia. Hernia confirmed negative in the right inguinal area and confirmed negative in the left inguinal area.   Genitourinary: Rectum normal, testes normal and penis normal. Rectal exam shows no external hemorrhoid, no mass and no tenderness. Prostate is enlarged. Prostate is not tender. Right testis shows no mass and no tenderness. Left testis shows no mass and no tenderness.       Musculoskeletal: He exhibits no edema or tenderness.   Lymphadenopathy: No inguinal adenopathy noted on the right or left side.   Neurological: He is alert and oriented to person, place, and time.   Skin: Skin is warm and dry. No rash noted. No erythema.     Psychiatric: He has a normal mood and affect. His behavior is normal. Judgment and thought content normal.       Urine dipstick shows negative for all components.  Micro exam: negative for WBC's or RBC's.    6/13/2019  PSA 0.6    US Retroperitoneal Complete (Kidney and   Order: [id number]   Status:  Final result   Visible to patient:  No (Not Released)   Next appt:  None   Dx:  Kidney stones; BPH with obstruction/l...   Details     Reading Physician Reading Date Result Priority   Carlos Solorio MD 5/7/2019       Narrative     EXAMINATION:  US RETROPERITONEAL COMPLETE    CLINICAL HISTORY:  Benign prostatic hyperplasia with lower urinary tract symptoms    TECHNIQUE:  Ultrasound of the kidneys and urinary bladder was performed including color flow and Doppler evaluation of the kidneys.    COMPARISON:  12/06/2017.    FINDINGS:  Right kidney: The right kidney measures 10.9 cm. No cortical thinning. No loss of corticomedullary distinction. Resistive index measures 0.75. No mass. No renal stone. No hydronephrosis.    Left kidney: The left kidney measures 12.6 cm. No cortical thinning. No loss of corticomedullary distinction. Resistive index measures 0.77. No mass. No renal stone. No  hydronephrosis.    The bladder is partially distended at the time of scanning and has an unremarkable appearance.Prevoid volume is 154 ml and postvoid residual is 27 ml.      Impression       No significant abnormality.      Electronically signed by: Carlos Solorio MD  Date: 05/07/2019  Time: 15:32            Last Resulted: 05/07/19 15:32               Assessment:       1. BPH with obstruction/lower urinary tract symptoms    2. Nocturia more than twice per night    3. Incomplete emptying of bladder    4. History of kidney stones          Plan:       1. BPH with obstruction/lower urinary tract symptoms  Flomax and Proscar  - Prostate Specific Antigen, Diagnostic; Future    2. Nocturia more than twice per night  Limit evening fluids  - POCT urinalysis, dipstick or tablet reag    3. Incomplete emptying of bladder  stable  - US Retroperitoneal Complete (Kidney and; Future    4. History of kidney stones  stable  - US Retroperitoneal Complete (Kidney and; Future            Follow up in about 1 year (around 6/19/2020) for Follow up, Review X-ray, Review PSA.

## 2019-09-05 LAB
CHOL/HDLC RATIO: 2.3
CHOLESTEROL, TOTAL: 131
HBA1C MFR BLD: 5.5 % (ref 4–5.6)
HDLC SERPL-MCNC: 58 MG/DL
LDLC SERPL CALC-MCNC: 62 MG/DL
NONHDLC SERPL-MCNC: 73 MG/DL
TRIGL SERPL-MCNC: 40 MG/DL

## 2019-10-29 RX ORDER — TAMSULOSIN HYDROCHLORIDE 0.4 MG/1
CAPSULE ORAL
Qty: 90 CAPSULE | Refills: 3 | Status: SHIPPED | OUTPATIENT
Start: 2019-10-29 | End: 2020-04-01 | Stop reason: SDUPTHER

## 2019-12-14 DIAGNOSIS — N40.0 BPH WITHOUT URINARY OBSTRUCTION: ICD-10-CM

## 2019-12-16 RX ORDER — FINASTERIDE 5 MG/1
TABLET, FILM COATED ORAL
Qty: 90 TABLET | Refills: 0 | Status: SHIPPED | OUTPATIENT
Start: 2019-12-16 | End: 2020-03-25

## 2020-03-25 DIAGNOSIS — N40.0 BPH WITHOUT URINARY OBSTRUCTION: ICD-10-CM

## 2020-03-25 RX ORDER — FINASTERIDE 5 MG/1
TABLET, FILM COATED ORAL
Qty: 90 TABLET | Refills: 0 | Status: SHIPPED | OUTPATIENT
Start: 2020-03-25 | End: 2020-05-21 | Stop reason: SDUPTHER

## 2020-04-01 RX ORDER — TAMSULOSIN HYDROCHLORIDE 0.4 MG/1
1 CAPSULE ORAL DAILY
Qty: 90 CAPSULE | Refills: 3 | Status: SHIPPED | OUTPATIENT
Start: 2020-04-01 | End: 2021-06-11

## 2020-04-02 ENCOUNTER — TELEPHONE (OUTPATIENT)
Dept: UROLOGY | Facility: CLINIC | Age: 80
End: 2020-04-02

## 2020-05-21 DIAGNOSIS — N40.0 BPH WITHOUT URINARY OBSTRUCTION: ICD-10-CM

## 2020-05-21 RX ORDER — FINASTERIDE 5 MG/1
TABLET, FILM COATED ORAL
Qty: 90 TABLET | Refills: 0 | Status: SHIPPED | OUTPATIENT
Start: 2020-05-21 | End: 2020-09-03

## 2020-06-22 ENCOUNTER — TELEPHONE (OUTPATIENT)
Dept: UROLOGY | Facility: CLINIC | Age: 80
End: 2020-06-22

## 2020-06-22 NOTE — TELEPHONE ENCOUNTER
Spoke to pt advised orders already in epic pt can go to any [facility] facility closest to him. Trish

## 2020-06-22 NOTE — TELEPHONE ENCOUNTER
----- Message from Dheeraj Parmar sent at 6/22/2020  1:56 PM CDT -----  Name of Who is Calling: SAYRA BARNES [[id number]]    What is the request in detail: SAYRA BARNES [[id number]] is calling  in regards to orders before appointment  .... Please contact to further discuss and advise      Can the clinic reply by [facility]: no     [facility]  [phone] (home)

## 2020-06-25 ENCOUNTER — LAB VISIT (OUTPATIENT)
Dept: LAB | Facility: HOSPITAL | Age: 80
End: 2020-06-25
Attending: UROLOGY
Payer: MEDICARE

## 2020-06-25 DIAGNOSIS — N13.8 BPH WITH OBSTRUCTION/LOWER URINARY TRACT SYMPTOMS: ICD-10-CM

## 2020-06-25 DIAGNOSIS — N40.1 BPH WITH OBSTRUCTION/LOWER URINARY TRACT SYMPTOMS: ICD-10-CM

## 2020-06-25 PROCEDURE — 36415 COLL VENOUS BLD VENIPUNCTURE: CPT | Mod: HCNC,PO

## 2020-06-25 PROCEDURE — 84153 ASSAY OF PSA TOTAL: CPT | Mod: HCNC

## 2020-06-26 LAB — COMPLEXED PSA SERPL-MCNC: 0.82 NG/ML (ref 0–4)

## 2020-07-02 ENCOUNTER — OFFICE VISIT (OUTPATIENT)
Dept: UROLOGY | Facility: CLINIC | Age: 80
End: 2020-07-02
Payer: MEDICARE

## 2020-07-02 VITALS
RESPIRATION RATE: 18 BRPM | DIASTOLIC BLOOD PRESSURE: 84 MMHG | HEIGHT: 67 IN | WEIGHT: 192.44 LBS | BODY MASS INDEX: 30.2 KG/M2 | SYSTOLIC BLOOD PRESSURE: 130 MMHG

## 2020-07-02 DIAGNOSIS — R35.1 NOCTURIA MORE THAN TWICE PER NIGHT: ICD-10-CM

## 2020-07-02 DIAGNOSIS — N13.8 BPH WITH OBSTRUCTION/LOWER URINARY TRACT SYMPTOMS: Primary | ICD-10-CM

## 2020-07-02 DIAGNOSIS — R33.9 INCOMPLETE EMPTYING OF BLADDER: ICD-10-CM

## 2020-07-02 DIAGNOSIS — Z87.442 HISTORY OF KIDNEY STONES: ICD-10-CM

## 2020-07-02 DIAGNOSIS — N40.1 BPH WITH OBSTRUCTION/LOWER URINARY TRACT SYMPTOMS: Primary | ICD-10-CM

## 2020-07-02 PROCEDURE — 1159F MED LIST DOCD IN RCRD: CPT | Mod: HCNC,S$GLB,, | Performed by: UROLOGY

## 2020-07-02 PROCEDURE — 1159F PR MEDICATION LIST DOCUMENTED IN MEDICAL RECORD: ICD-10-PCS | Mod: HCNC,S$GLB,, | Performed by: UROLOGY

## 2020-07-02 PROCEDURE — 99999 PR PBB SHADOW E&M-EST. PATIENT-LVL IV: CPT | Mod: PBBFAC,HCNC,, | Performed by: UROLOGY

## 2020-07-02 PROCEDURE — 1101F PR PT FALLS ASSESS DOC 0-1 FALLS W/OUT INJ PAST YR: ICD-10-PCS | Mod: HCNC,CPTII,S$GLB, | Performed by: UROLOGY

## 2020-07-02 PROCEDURE — 81001 POCT URINALYSIS, DIPSTICK OR TABLET REAGENT, AUTOMATED, WITH MICROSCOP: ICD-10-PCS | Mod: HCNC,S$GLB,, | Performed by: UROLOGY

## 2020-07-02 PROCEDURE — 1126F PR PAIN SEVERITY QUANTIFIED, NO PAIN PRESENT: ICD-10-PCS | Mod: HCNC,S$GLB,, | Performed by: UROLOGY

## 2020-07-02 PROCEDURE — 1101F PT FALLS ASSESS-DOCD LE1/YR: CPT | Mod: HCNC,CPTII,S$GLB, | Performed by: UROLOGY

## 2020-07-02 PROCEDURE — 99214 OFFICE O/P EST MOD 30 MIN: CPT | Mod: HCNC,25,S$GLB, | Performed by: UROLOGY

## 2020-07-02 PROCEDURE — 81001 URINALYSIS AUTO W/SCOPE: CPT | Mod: HCNC,S$GLB,, | Performed by: UROLOGY

## 2020-07-02 PROCEDURE — 99999 PR PBB SHADOW E&M-EST. PATIENT-LVL IV: ICD-10-PCS | Mod: PBBFAC,HCNC,, | Performed by: UROLOGY

## 2020-07-02 PROCEDURE — 1126F AMNT PAIN NOTED NONE PRSNT: CPT | Mod: HCNC,S$GLB,, | Performed by: UROLOGY

## 2020-07-02 PROCEDURE — 99214 PR OFFICE/OUTPT VISIT, EST, LEVL IV, 30-39 MIN: ICD-10-PCS | Mod: HCNC,25,S$GLB, | Performed by: UROLOGY

## 2020-07-02 RX ORDER — ALLOPURINOL 100 MG/1
TABLET ORAL
COMMUNITY
Start: 2020-04-16 | End: 2020-11-19

## 2020-07-02 NOTE — PROGRESS NOTES
Subjective:       Patient ID: Geronimo Gu is a 79 y.o. male who was last seen in this office Visit date not found    Chief Complaint:   Chief Complaint   Patient presents with    Follow-up     patient has no new concerns           History of Present Illness  Benign Prostatic Hypertrophy  Patient complains of lower urinary tract symptoms. He reports nocturia one time a night. He denies straining, urgency and weak stream. Patient states symptoms are of mild severity. Onset of symptoms was several years ago and was gradual in onset. He has no personal history and no family history of prostate cancer. He reports a history of no complicating symptoms. He denies flank pain, gross hematuria, kidney stones and recurrent UTI.  He is currently taking Flomax and Proscar.    Kidney Stones  He had a left proximal ureteral stone that was removed in August 2017.  His stent was removed in September.  He is back with an ultrasound.  He denies flank pain or hematuria or fever.        ACTIVE MEDICAL ISSUES:  Patient Active Problem List   Diagnosis    Ureteral stone       ALLERGIES AND MEDICATIONS: updated and reviewed.  Review of patient's allergies indicates:   Allergen Reactions    Pcn [penicillins] Hives    Statins-hmg-coa reductase inhibitors      Current Outpatient Medications   Medication Sig    allopurinoL (ZYLOPRIM) 100 MG tablet     amLODIPine (NORVASC) 5 MG tablet     aspirin (ECOTRIN) 81 MG EC tablet Take 81 mg by mouth once daily.    co-enzyme Q-10 30 mg capsule Take 30 mg by mouth 3 (three) times daily.    evolocumab (REPATHA SURECLICK) 140 mg/mL PnIj Inject into the skin.    finasteride (PROSCAR) 5 mg tablet TAKE 1 TABLET EVERY DAY    gabapentin (NEURONTIN) 300 MG capsule Take 300 mg by mouth 3 (three) times daily.    hydroCHLOROthiazide (HYDRODIURIL) 25 MG tablet     ibuprofen (ADVIL,MOTRIN) 800 MG tablet Take 800 mg by mouth every 6 (six) hours as needed for Pain.    metoprolol succinate (TOPROL-XL)  "25 MG 24 hr tablet 12.5 mg once daily.     ranitidine (ZANTAC) 150 MG tablet     tamsulosin (FLOMAX) 0.4 mg Cap Take 1 capsule (0.4 mg total) by mouth once daily.    VELTASSA 8.4 gram PwPk TK 1 PACKET AND MIX WITH 30ML OF WATER UNTIL FULLY DISSOLVED ADD ADDITIONAL 60 ML AND TAKE PO THREE TIMES WEEKLY.    VENTOLIN HFA 90 mcg/actuation inhaler      No current facility-administered medications for this visit.        Review of Systems   Constitutional: Negative for activity change, fatigue, fever and unexpected weight change.   HENT: Negative for congestion.    Eyes: Negative for redness.   Respiratory: Negative for chest tightness and shortness of breath.    Cardiovascular: Negative for chest pain and leg swelling.   Gastrointestinal: Negative for abdominal pain, constipation, diarrhea, nausea and vomiting.   Genitourinary: Negative for dysuria, flank pain, frequency, hematuria, penile pain, penile swelling, scrotal swelling, testicular pain and urgency.   Musculoskeletal: Negative for arthralgias and back pain.   Neurological: Negative for dizziness and light-headedness.   Psychiatric/Behavioral: Negative for behavioral problems and confusion. The patient is not nervous/anxious.    All other systems reviewed and are negative.      Objective:      Vitals:    07/02/20 1014   BP: 130/84   Resp: 18   Weight: 87.3 kg (192 lb 7.4 oz)   Height: 5' 7" (1.702 m)     Physical Exam   Nursing note and vitals reviewed.  Constitutional: He is oriented to person, place, and time. He appears well-developed.   HENT:   Head: Normocephalic.   Eyes: Conjunctivae are normal.   Neck: Normal range of motion. No tracheal deviation present. No thyromegaly present.   Cardiovascular: Normal rate and normal heart sounds.    Pulmonary/Chest: Effort normal and breath sounds normal. No respiratory distress. He has no wheezes.   Abdominal: Soft. He exhibits no distension and no mass. There is no abdominal tenderness. There is no rebound and no " guarding. No hernia. Hernia confirmed negative in the right inguinal area and confirmed negative in the left inguinal area.   Genitourinary:    Testes, penis and rectum normal.   Rectum:      No rectal mass, tenderness or external hemorrhoid.   Prostate is enlarged. Prostate is not tender. Right testis shows no mass and no tenderness. Left testis shows no mass and no tenderness.       Musculoskeletal: No tenderness.   Lymphadenopathy: No inguinal adenopathy noted on the right or left side.   Neurological: He is alert and oriented to person, place, and time.   Skin: Skin is warm and dry. No rash noted. No erythema.     Psychiatric: His behavior is normal. Judgment and thought content normal.       Urine dipstick shows negative for all components.  Micro exam: negative for WBC's or RBC's.    PSA DIAGNOSTIC 0.00 - 4.00 ng/mL 0.82    Comment: PSA Expected levels:   Hormonal Therapy: <0.05 ng/ml   Prostatectomy: <0.01 ng/ml   Radiation Therapy: <1.00 ng/ml    Resulting Agency  [facility]      Narrative  Performed by: [facility]  1 year      Specimen Collected: 06/25/20 14:39   Last Resulted: 06/26/20 04:17            Assessment:       1. BPH with obstruction/lower urinary tract symptoms    2. Nocturia more than twice per night    3. Incomplete emptying of bladder    4. History of kidney stones          Plan:       1. BPH with obstruction/lower urinary tract symptoms  Flomax and Proscar  JOLEEN and PSA in a year  - Prostate Specific Antigen, Diagnostic; Future    2. Nocturia more than twice per night  Limit evening fluids  - POCT urinalysis, dipstick or tablet reag    3. Incomplete emptying of bladder  stable  - US Retroperitoneal Complete (Kidney and; Future    4. History of kidney stones  stable  - US Retroperitoneal Complete (Kidney and; Future            Follow up in about 1 year (around 7/2/2021) for Review PSA, Follow up, Review X-ray.

## 2020-07-06 LAB
BILIRUB SERPL-MCNC: ABNORMAL MG/DL
BLOOD URINE, POC: ABNORMAL
COLOR, POC UA: ABNORMAL
GLUCOSE UR QL STRIP: ABNORMAL
KETONES UR QL STRIP: ABNORMAL
LEUKOCYTE ESTERASE URINE, POC: ABNORMAL
NITRITE, POC UA: ABNORMAL
PH, POC UA: 5
PROTEIN, POC: ABNORMAL
SPECIFIC GRAVITY, POC UA: 1025
UROBILINOGEN, POC UA: ABNORMAL

## 2020-10-05 ENCOUNTER — OFFICE VISIT (OUTPATIENT)
Dept: INTERNAL MEDICINE | Facility: CLINIC | Age: 80
End: 2020-10-05
Payer: MEDICARE

## 2020-10-05 VITALS
BODY MASS INDEX: 29.48 KG/M2 | TEMPERATURE: 98 F | SYSTOLIC BLOOD PRESSURE: 124 MMHG | HEIGHT: 67 IN | DIASTOLIC BLOOD PRESSURE: 71 MMHG | HEART RATE: 58 BPM | WEIGHT: 187.81 LBS

## 2020-10-05 DIAGNOSIS — M54.50 CHRONIC LOW BACK PAIN, UNSPECIFIED BACK PAIN LATERALITY, UNSPECIFIED WHETHER SCIATICA PRESENT: ICD-10-CM

## 2020-10-05 DIAGNOSIS — N13.8 OTHER OBSTRUCTIVE AND REFLUX UROPATHY: ICD-10-CM

## 2020-10-05 DIAGNOSIS — I12.9 HYPERTENSIVE CHRONIC KIDNEY DISEASE WITH STAGE 1 THROUGH STAGE 4 CHRONIC KIDNEY DISEASE, OR UNSPECIFIED CHRONIC KIDNEY DISEASE: ICD-10-CM

## 2020-10-05 DIAGNOSIS — E78.2 MIXED HYPERLIPIDEMIA: ICD-10-CM

## 2020-10-05 DIAGNOSIS — G56.03 CARPAL TUNNEL SYNDROME, BILATERAL UPPER LIMBS: ICD-10-CM

## 2020-10-05 DIAGNOSIS — I10 ESSENTIAL (PRIMARY) HYPERTENSION: ICD-10-CM

## 2020-10-05 DIAGNOSIS — J44.9 CHRONIC OBSTRUCTIVE PULMONARY DISEASE, UNSPECIFIED COPD TYPE: ICD-10-CM

## 2020-10-05 DIAGNOSIS — M15.9 POLYARTICULAR OSTEOARTHRITIS: ICD-10-CM

## 2020-10-05 DIAGNOSIS — G89.29 CHRONIC LOW BACK PAIN, UNSPECIFIED BACK PAIN LATERALITY, UNSPECIFIED WHETHER SCIATICA PRESENT: ICD-10-CM

## 2020-10-05 DIAGNOSIS — N18.2 CHRONIC KIDNEY DISEASE, STAGE 2 (MILD): ICD-10-CM

## 2020-10-05 DIAGNOSIS — K21.9 GASTRO-ESOPHAGEAL REFLUX DISEASE WITHOUT ESOPHAGITIS: ICD-10-CM

## 2020-10-05 DIAGNOSIS — N40.1 BENIGN PROSTATIC HYPERPLASIA WITH URINARY OBSTRUCTION: ICD-10-CM

## 2020-10-05 DIAGNOSIS — I65.23 OCCLUSION AND STENOSIS OF BILATERAL CAROTID ARTERIES: ICD-10-CM

## 2020-10-05 DIAGNOSIS — N52.9 IMPOTENCE OF ORGANIC ORIGIN: ICD-10-CM

## 2020-10-05 DIAGNOSIS — N13.8 BENIGN PROSTATIC HYPERPLASIA WITH URINARY OBSTRUCTION: ICD-10-CM

## 2020-10-05 DIAGNOSIS — Z90.49 S/P LAPAROSCOPIC CHOLECYSTECTOMY: ICD-10-CM

## 2020-10-05 DIAGNOSIS — Z74.09 OTHER REDUCED MOBILITY: ICD-10-CM

## 2020-10-05 DIAGNOSIS — R73.03 PREDIABETES: ICD-10-CM

## 2020-10-05 DIAGNOSIS — Z87.442 PERSONAL HISTORY OF URINARY CALCULI: ICD-10-CM

## 2020-10-05 DIAGNOSIS — R35.1 NOCTURIA: ICD-10-CM

## 2020-10-05 DIAGNOSIS — I73.9 PAD (PERIPHERAL ARTERY DISEASE): ICD-10-CM

## 2020-10-05 DIAGNOSIS — E79.0 HYPERURICEMIA WITHOUT SIGNS OF INFLAMMATORY ARTHRITIS AND TOPHACEOUS DISEASE: ICD-10-CM

## 2020-10-05 DIAGNOSIS — E87.5 HYPERKALEMIA: ICD-10-CM

## 2020-10-05 PROBLEM — Z78.9 STATIN INTOLERANCE: Status: ACTIVE | Noted: 2020-10-05

## 2020-10-05 PROBLEM — I49.3 PVC (PREMATURE VENTRICULAR CONTRACTION): Status: ACTIVE | Noted: 2020-10-05

## 2020-10-05 PROBLEM — I25.10 CORONARY ARTERY DISEASE INVOLVING NATIVE CORONARY ARTERY WITHOUT ANGINA PECTORIS: Status: ACTIVE | Noted: 2019-01-25

## 2020-10-05 PROCEDURE — 1159F MED LIST DOCD IN RCRD: CPT | Mod: S$GLB,,, | Performed by: INTERNAL MEDICINE

## 2020-10-05 PROCEDURE — 1159F PR MEDICATION LIST DOCUMENTED IN MEDICAL RECORD: ICD-10-PCS | Mod: S$GLB,,, | Performed by: INTERNAL MEDICINE

## 2020-10-05 PROCEDURE — 1126F PR PAIN SEVERITY QUANTIFIED, NO PAIN PRESENT: ICD-10-PCS | Mod: S$GLB,,, | Performed by: INTERNAL MEDICINE

## 2020-10-05 PROCEDURE — 1126F AMNT PAIN NOTED NONE PRSNT: CPT | Mod: S$GLB,,, | Performed by: INTERNAL MEDICINE

## 2020-10-05 PROCEDURE — 99214 OFFICE O/P EST MOD 30 MIN: CPT | Mod: 25,S$GLB,, | Performed by: INTERNAL MEDICINE

## 2020-10-05 PROCEDURE — 1101F PR PT FALLS ASSESS DOC 0-1 FALLS W/OUT INJ PAST YR: ICD-10-PCS | Mod: CPTII,S$GLB,, | Performed by: INTERNAL MEDICINE

## 2020-10-05 PROCEDURE — G0008 FLU VACCINE - QUADRIVALENT - ADJUVANTED: ICD-10-PCS | Mod: S$GLB,,, | Performed by: INTERNAL MEDICINE

## 2020-10-05 PROCEDURE — 90694 VACC AIIV4 NO PRSRV 0.5ML IM: CPT | Mod: S$GLB,,, | Performed by: INTERNAL MEDICINE

## 2020-10-05 PROCEDURE — 99214 PR OFFICE/OUTPT VISIT, EST, LEVL IV, 30-39 MIN: ICD-10-PCS | Mod: 25,S$GLB,, | Performed by: INTERNAL MEDICINE

## 2020-10-05 PROCEDURE — G0008 ADMIN INFLUENZA VIRUS VAC: HCPCS | Mod: S$GLB,,, | Performed by: INTERNAL MEDICINE

## 2020-10-05 PROCEDURE — 1101F PT FALLS ASSESS-DOCD LE1/YR: CPT | Mod: CPTII,S$GLB,, | Performed by: INTERNAL MEDICINE

## 2020-10-05 PROCEDURE — 90694 FLU VACCINE - QUADRIVALENT - ADJUVANTED: ICD-10-PCS | Mod: S$GLB,,, | Performed by: INTERNAL MEDICINE

## 2020-10-05 RX ORDER — BISMUTH SUBCITRATE POTASSIUM, METRONIDAZOLE, TETRACYCLINE HYDROCHLORIDE 140; 125; 125 MG/1; MG/1; MG/1
CAPSULE ORAL
COMMUNITY
Start: 2020-07-30 | End: 2021-01-04

## 2020-10-05 RX ORDER — HYDROGEN PEROXIDE 3 %
20 SOLUTION, NON-ORAL MISCELLANEOUS
COMMUNITY
Start: 2020-09-24 | End: 2021-12-16 | Stop reason: SDUPTHER

## 2020-10-05 NOTE — PROGRESS NOTES
Chief C/o:    Chronic Kidney Disease, Hypertension, Hyperlipidemia, Hyperkalemia, and COPD        Health Care Maintenance    Health Maintenance       Date Due Completion Date    Colonoscopy 02/02/2021 2/2/2018 (Done)    Override on 2/2/2018: Done    Override on 9/9/2013: Done    TETANUS VACCINE 04/25/2024 4/25/2014    Lipid Panel 09/05/2024 9/5/2019 (Done)    Override on 9/5/2019: Done                 HISTORY OF PRESENT ILLNESS:    JUDE Gu is a 79 y.o. male who presents to the clinic today for Chronic Kidney Disease, Hypertension, Hyperlipidemia, Hyperkalemia, and COPD  .  Patient feels well in general, he has no chest pain, no shortness of breath, no nausea, no fever, no chills.  Patient had cholecystectomy, laparoscopic, about 2 months ago by Dr. Melton.                  ALLERGIES AND MEDICATIONS: updated and reviewed.  Review of patient's allergies indicates:   Allergen Reactions    Lipitor [atorvastatin]      Pain/cramping    Pcn [penicillins] Hives    Pravastatin      Pain/cramping    Simvastatin     Statins-hmg-coa reductase inhibitors      Medication List with Changes/Refills   Current Medications    ALLOPURINOL (ZYLOPRIM) 100 MG TABLET        AMLODIPINE (NORVASC) 5 MG TABLET        ASPIRIN (ECOTRIN) 81 MG EC TABLET    Take 81 mg by mouth once daily.    CO-ENZYME Q-10 30 MG CAPSULE    Take 30 mg by mouth 3 (three) times daily.    ESOMEPRAZOLE (NEXIUM) 20 MG CAPSULE        EVOLOCUMAB (REPATHA SURECLICK) 140 MG/ML PNIJ    Inject into the skin.    FINASTERIDE (PROSCAR) 5 MG TABLET    TAKE 1 TABLET EVERY DAY    HYDROCHLOROTHIAZIDE (HYDRODIURIL) 25 MG TABLET        METOPROLOL SUCCINATE (TOPROL-XL) 25 MG 24 HR TABLET    12.5 mg once daily.     PYLERA 140-125-125 MG PER CAPSULE    TK 3 CS PO QID FOR 10 DAYS    TAMSULOSIN (FLOMAX) 0.4 MG CAP    Take 1 capsule (0.4 mg total) by mouth once daily.    VENTOLIN HFA 90 MCG/ACTUATION INHALER       Discontinued Medications    DEXAMETHASONE (DECADRON) 2 MG  TABLET    TAKE 1 TABLET EVERY 12 HOURS AS NEEDED FOR PAINFUL GOUT ATTACKS, NOT TO EXCEED 6 DOSES PER EPISODE.    GABAPENTIN (NEURONTIN) 300 MG CAPSULE    Take 300 mg by mouth 3 (three) times daily.    IBUPROFEN (ADVIL,MOTRIN) 800 MG TABLET    Take 800 mg by mouth every 6 (six) hours as needed for Pain.    RANITIDINE (ZANTAC) 150 MG TABLET        VELTASSA 8.4 GRAM PWPK    TK 1 PACKET AND MIX WITH 30ML OF WATER UNTIL FULLY DISSOLVED ADD ADDITIONAL 60 ML AND TAKE PO THREE TIMES WEEKLY.             CARE TEAM:    Patient Care Team:  Paresh Escobedo MD as PCP - General (Internal Medicine)         REVIEW OF SYSTEMS:    Review of Systems   Constitutional: Negative for appetite change, chills, diaphoresis, fatigue, fever and unexpected weight change.   HENT: Negative for congestion, drooling, ear discharge, ear pain, facial swelling, hearing loss, nosebleeds, rhinorrhea, sinus pain, sneezing, sore throat, tinnitus, trouble swallowing and voice change.    Eyes: Negative for pain, discharge, redness, itching and visual disturbance.   Respiratory: Negative for cough, choking, chest tightness, shortness of breath, wheezing and stridor.    Cardiovascular: Negative for chest pain, palpitations and leg swelling.        Claudication of legs with exertion.   Gastrointestinal: Negative for abdominal distention, abdominal pain, blood in stool, constipation, diarrhea, nausea and vomiting.   Endocrine: Negative for cold intolerance, heat intolerance, polydipsia, polyphagia and polyuria.   Genitourinary: Negative for difficulty urinating, dysuria, flank pain, frequency, hematuria and urgency.   Musculoskeletal: Positive for arthralgias. Negative for back pain, gait problem, joint swelling, myalgias, neck pain and neck stiffness.   Skin: Negative for color change, pallor, rash and wound.   Allergic/Immunologic: Negative for environmental allergies, food allergies and immunocompromised state.   Neurological: Negative for dizziness,  "tremors, seizures, syncope, speech difficulty, weakness, light-headedness, numbness and headaches.   Hematological: Negative for adenopathy. Does not bruise/bleed easily.   Psychiatric/Behavioral: Negative for agitation, behavioral problems, confusion, decreased concentration, dysphoric mood, hallucinations, sleep disturbance and suicidal ideas. The patient is not nervous/anxious.          PHYSICAL EXAM:    Vitals:    10/05/20 1422   BP: 124/71   Pulse: (!) 58   Temp: 97.8 °F (36.6 °C)     Weight: 85.2 kg (187 lb 13.3 oz)   Height: 5' 7" (170.2 cm)   Body mass index is 29.42 kg/m².  Vitals:    10/05/20 1422   BP: 124/71   Pulse: (!) 58   Temp: 97.8 °F (36.6 °C)   TempSrc: Temporal   Weight: 85.2 kg (187 lb 13.3 oz)   Height: 5' 7" (1.702 m)   PainSc: 0-No pain          Physical Exam   Constitutional: He is oriented to person, place, and time. He appears well-developed and well-nourished.  Non-toxic appearance. He does not appear ill. No distress.   Patient is overweight, his comfortable, cooperative, in no obvious distress.   HENT:   Head: Normocephalic and atraumatic.   Right Ear: Tympanic membrane, external ear and ear canal normal.   Left Ear: Tympanic membrane, external ear and ear canal normal.   Nose: Nose normal. No rhinorrhea or nasal congestion.   Mouth/Throat: Oropharynx is clear and moist. Mucous membranes are moist. No oropharyngeal exudate or posterior oropharyngeal erythema. Oropharynx is clear.   Eyes: Pupils are equal, round, and reactive to light. Conjunctivae are normal. Right eye exhibits no discharge. Left eye exhibits no discharge. No scleral icterus.   Neck: Normal range of motion. Neck supple. No JVD present. No muscular tenderness present. No neck rigidity. No tracheal deviation present. No thyromegaly present.   Cardiovascular: Normal rate, regular rhythm, normal heart sounds and normal pulses. Exam reveals no gallop and no friction rub.   No murmur heard.  Pulmonary/Chest: Effort normal and " breath sounds normal. No stridor. No respiratory distress. He has no wheezes. He has no rhonchi. He has no rales. He exhibits no tenderness.   Abdominal: Soft. Normal appearance and bowel sounds are normal. He exhibits no distension and no mass. There is no abdominal tenderness. There is no rebound and no guarding. No hernia.   Genitourinary:    Genitourinary Comments: No costovertebral angle tenderness.     Musculoskeletal: Normal range of motion.         General: No swelling, tenderness, deformity or signs of injury.      Right lower leg: No edema.      Left lower leg: No edema.      Comments: Crepitation knees with no swelling no tenderness   Lymphadenopathy:     He has no cervical adenopathy.   Neurological: He is alert and oriented to person, place, and time. He displays no weakness and normal reflexes. No cranial nerve deficit or sensory deficit. He exhibits normal muscle tone. Coordination normal.   Skin: Skin is warm and dry. Capillary refill takes less than 2 seconds. No bruising, no lesion and no rash noted. He is not diaphoretic. No erythema. No jaundice or pallor.   Psychiatric: His behavior is normal. Mood, judgment and thought content normal.   Nursing note and vitals reviewed.         Labs:    No results found for: GLU, NA, K, CL, CO2, BUN, CREATININE, CALCIUM, PROT, ALBUMIN, BILITOT, ALKPHOS, AST, ALT, ANIONGAP, ESTGFRAFRICA, EGFRNONAA  No results found for: WBC, RBC, HGB, HCT, MCV, RDW, PLT   No results found for: CHOL, TRIG, HDL, LDLCALC, TOTALCHOLEST  No results found for: TSH  No results found for: HGBA1C, ESTIMATEDAVG       ASSESSMENT & PLAN:    1. Essential (primary) hypertension  - CBC auto differential; Future  - CBC auto differential    2. Mixed hyperlipidemia  - Lipid Panel; Future  - Lipid Panel    3. Chronic obstructive pulmonary disease, unspecified COPD type    4. Prediabetes  - Hemoglobin A1C; Future  - Hemoglobin A1C    5. PAD (peripheral artery disease)    6. Chronic kidney disease,  stage 2 (mild)  - Comprehensive Metabolic Panel; Future  - Hemoglobin A1C; Future  - Comprehensive Metabolic Panel  - Hemoglobin A1C    7. Benign prostatic hyperplasia with urinary obstruction    8. Polyarticular osteoarthritis    9. Personal history of urinary calculi    10. Other reduced mobility    11. Other obstructive and reflux uropathy    12. Occlusion and stenosis of bilateral carotid arteries    13. Nocturia    14. Impotence of organic origin    15. 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    16. Hypertensive chronic kidney disease with stage 1 through stage 4 chronic kidney disease, or unspecified chronic kidney disease    17. Hyperkalemia    18. Gastro-esophageal reflux disease without esophagitis    19. Chronic low back pain, unspecified back pain laterality, unspecified whether sciatica present    20. Carpal tunnel syndrome, bilateral upper limbs    21. BMI 29.0-29.9,adult    22. S/P laparoscopic cholecystectomy       Patient is stable, his medication were reviewed and will continue same medications, labs were ordered and will follow with 1 month.  He needs to diet exercise and lose weight  Low-fat diet, increase vegetables, learn how to count calories, check weight every morning and keep a diet and weight diary.  Bring the diary next visit.  Try to identify one specific food item or habit that you can improve, try to stick to it and add another item after 2-4 weeks interval. If you are not improving as you wish, bring your food diary and we will try, together, find something specific that we can work on.        Orders Placed This Encounter   Procedures    Influenza (FLUAD) - Quadrivalent (Adjuvanted) *Preferred* (65+) (PF)    CBC auto differential    Comprehensive Metabolic Panel    Lipid Panel    Hemoglobin A1C      Follow up in about 1 month (around 11/5/2020). or sooner as needed.    Patient was counseled and questions and concerns were addressed.    Please  note:  Parts of this report were done using a dictation software, voice to text, and sometimes the text contains some uncorrected words or sentences that are missed during revision.

## 2020-11-06 LAB
ALBUMIN SERPL-MCNC: 3.8 G/DL (ref 3.6–5.1)
ALBUMIN/GLOB SERPL: 1.7 (CALC) (ref 1–2.5)
ALP SERPL-CCNC: 108 U/L (ref 35–144)
ALT SERPL-CCNC: 24 U/L (ref 9–46)
AST SERPL-CCNC: 18 U/L (ref 10–35)
BASOPHILS # BLD AUTO: 21 CELLS/UL (ref 0–200)
BASOPHILS NFR BLD AUTO: 0.4 %
BILIRUB SERPL-MCNC: 0.7 MG/DL (ref 0.2–1.2)
BUN SERPL-MCNC: 24 MG/DL (ref 7–25)
BUN/CREAT SERPL: ABNORMAL (CALC) (ref 6–22)
CALCIUM SERPL-MCNC: 9.3 MG/DL (ref 8.6–10.3)
CHLORIDE SERPL-SCNC: 105 MMOL/L (ref 98–110)
CHOLEST SERPL-MCNC: 128 MG/DL
CHOLEST/HDLC SERPL: 2.4 (CALC)
CO2 SERPL-SCNC: 34 MMOL/L (ref 20–32)
CREAT SERPL-MCNC: 1.02 MG/DL (ref 0.7–1.11)
EOSINOPHIL # BLD AUTO: 133 CELLS/UL (ref 15–500)
EOSINOPHIL NFR BLD AUTO: 2.5 %
ERYTHROCYTE [DISTWIDTH] IN BLOOD BY AUTOMATED COUNT: 12.6 % (ref 11–15)
GFRSERPLBLD MDRD-ARVRAT: 68 ML/MIN/1.73M2
GLOBULIN SER CALC-MCNC: 2.3 G/DL (CALC) (ref 1.9–3.7)
GLUCOSE SERPL-MCNC: 92 MG/DL (ref 65–99)
HBA1C MFR BLD: 5.2 % OF TOTAL HGB
HCT VFR BLD AUTO: 39.7 % (ref 38.5–50)
HDLC SERPL-MCNC: 54 MG/DL
HGB BLD-MCNC: 13.7 G/DL (ref 13.2–17.1)
LDLC SERPL CALC-MCNC: 63 MG/DL (CALC)
LYMPHOCYTES # BLD AUTO: 2030 CELLS/UL (ref 850–3900)
LYMPHOCYTES NFR BLD AUTO: 38.3 %
MCH RBC QN AUTO: 30.9 PG (ref 27–33)
MCHC RBC AUTO-ENTMCNC: 34.5 G/DL (ref 32–36)
MCV RBC AUTO: 89.6 FL (ref 80–100)
MONOCYTES # BLD AUTO: 493 CELLS/UL (ref 200–950)
MONOCYTES NFR BLD AUTO: 9.3 %
NEUTROPHILS # BLD AUTO: 2624 CELLS/UL (ref 1500–7800)
NEUTROPHILS NFR BLD AUTO: 49.5 %
NONHDLC SERPL-MCNC: 74 MG/DL (CALC)
PLATELET # BLD AUTO: 157 THOUSAND/UL (ref 140–400)
PMV BLD REES-ECKER: 9.3 FL (ref 7.5–12.5)
POTASSIUM SERPL-SCNC: 4.2 MMOL/L (ref 3.5–5.3)
PROT SERPL-MCNC: 6.1 G/DL (ref 6.1–8.1)
RBC # BLD AUTO: 4.43 MILLION/UL (ref 4.2–5.8)
SODIUM SERPL-SCNC: 143 MMOL/L (ref 135–146)
TRIGL SERPL-MCNC: 37 MG/DL
WBC # BLD AUTO: 5.3 THOUSAND/UL (ref 3.8–10.8)

## 2020-11-09 ENCOUNTER — OFFICE VISIT (OUTPATIENT)
Dept: INTERNAL MEDICINE | Facility: CLINIC | Age: 80
End: 2020-11-09
Payer: MEDICARE

## 2020-11-09 VITALS
HEIGHT: 67 IN | HEART RATE: 49 BPM | TEMPERATURE: 97 F | DIASTOLIC BLOOD PRESSURE: 72 MMHG | SYSTOLIC BLOOD PRESSURE: 127 MMHG | BODY MASS INDEX: 28.95 KG/M2 | WEIGHT: 184.44 LBS

## 2020-11-09 DIAGNOSIS — I12.9 HYPERTENSIVE CHRONIC KIDNEY DISEASE WITH STAGE 1 THROUGH STAGE 4 CHRONIC KIDNEY DISEASE, OR UNSPECIFIED CHRONIC KIDNEY DISEASE: Primary | ICD-10-CM

## 2020-11-09 DIAGNOSIS — I25.10 CORONARY ARTERY DISEASE INVOLVING NATIVE CORONARY ARTERY OF NATIVE HEART WITHOUT ANGINA PECTORIS: ICD-10-CM

## 2020-11-09 DIAGNOSIS — G89.29 CHRONIC LOW BACK PAIN, UNSPECIFIED BACK PAIN LATERALITY, UNSPECIFIED WHETHER SCIATICA PRESENT: ICD-10-CM

## 2020-11-09 DIAGNOSIS — R35.1 NOCTURIA: ICD-10-CM

## 2020-11-09 DIAGNOSIS — N18.2 CHRONIC KIDNEY DISEASE, STAGE 2 (MILD): ICD-10-CM

## 2020-11-09 DIAGNOSIS — R73.03 PREDIABETES: ICD-10-CM

## 2020-11-09 DIAGNOSIS — Z87.442 PERSONAL HISTORY OF URINARY CALCULI: ICD-10-CM

## 2020-11-09 DIAGNOSIS — I73.9 PAD (PERIPHERAL ARTERY DISEASE): ICD-10-CM

## 2020-11-09 DIAGNOSIS — I65.23 OCCLUSION AND STENOSIS OF BILATERAL CAROTID ARTERIES: ICD-10-CM

## 2020-11-09 DIAGNOSIS — Z74.09 OTHER REDUCED MOBILITY: ICD-10-CM

## 2020-11-09 DIAGNOSIS — N13.8 BENIGN PROSTATIC HYPERPLASIA WITH URINARY OBSTRUCTION: ICD-10-CM

## 2020-11-09 DIAGNOSIS — E78.2 MIXED HYPERLIPIDEMIA: ICD-10-CM

## 2020-11-09 DIAGNOSIS — N13.8 OTHER OBSTRUCTIVE AND REFLUX UROPATHY: ICD-10-CM

## 2020-11-09 DIAGNOSIS — K21.9 GASTRO-ESOPHAGEAL REFLUX DISEASE WITHOUT ESOPHAGITIS: ICD-10-CM

## 2020-11-09 DIAGNOSIS — I49.3 PVC (PREMATURE VENTRICULAR CONTRACTION): ICD-10-CM

## 2020-11-09 DIAGNOSIS — N40.1 BENIGN PROSTATIC HYPERPLASIA WITH URINARY OBSTRUCTION: ICD-10-CM

## 2020-11-09 DIAGNOSIS — M15.9 POLYARTICULAR OSTEOARTHRITIS: ICD-10-CM

## 2020-11-09 DIAGNOSIS — M54.50 CHRONIC LOW BACK PAIN, UNSPECIFIED BACK PAIN LATERALITY, UNSPECIFIED WHETHER SCIATICA PRESENT: ICD-10-CM

## 2020-11-09 DIAGNOSIS — J44.9 CHRONIC OBSTRUCTIVE PULMONARY DISEASE, UNSPECIFIED COPD TYPE: ICD-10-CM

## 2020-11-09 DIAGNOSIS — Z78.9 STATIN INTOLERANCE: ICD-10-CM

## 2020-11-09 DIAGNOSIS — E79.0 HYPERURICEMIA WITHOUT SIGNS OF INFLAMMATORY ARTHRITIS AND TOPHACEOUS DISEASE: ICD-10-CM

## 2020-11-09 DIAGNOSIS — R00.1 BRADYCARDIA: ICD-10-CM

## 2020-11-09 DIAGNOSIS — N52.9 IMPOTENCE OF ORGANIC ORIGIN: ICD-10-CM

## 2020-11-09 PROCEDURE — G0439 PR MEDICARE ANNUAL WELLNESS SUBSEQUENT VISIT: ICD-10-PCS | Mod: S$GLB,,, | Performed by: INTERNAL MEDICINE

## 2020-11-09 PROCEDURE — 99213 PR OFFICE/OUTPT VISIT, EST, LEVL III, 20-29 MIN: ICD-10-PCS | Mod: 25,S$GLB,, | Performed by: INTERNAL MEDICINE

## 2020-11-09 PROCEDURE — G0439 PPPS, SUBSEQ VISIT: HCPCS | Mod: S$GLB,,, | Performed by: INTERNAL MEDICINE

## 2020-11-09 PROCEDURE — 99213 OFFICE O/P EST LOW 20 MIN: CPT | Mod: 25,S$GLB,, | Performed by: INTERNAL MEDICINE

## 2020-11-09 PROCEDURE — 1126F PR PAIN SEVERITY QUANTIFIED, NO PAIN PRESENT: ICD-10-PCS | Mod: S$GLB,,, | Performed by: INTERNAL MEDICINE

## 2020-11-09 PROCEDURE — 1126F AMNT PAIN NOTED NONE PRSNT: CPT | Mod: S$GLB,,, | Performed by: INTERNAL MEDICINE

## 2020-11-09 PROCEDURE — 1159F PR MEDICATION LIST DOCUMENTED IN MEDICAL RECORD: ICD-10-PCS | Mod: S$GLB,,, | Performed by: INTERNAL MEDICINE

## 2020-11-09 PROCEDURE — 1159F MED LIST DOCD IN RCRD: CPT | Mod: S$GLB,,, | Performed by: INTERNAL MEDICINE

## 2020-11-09 PROCEDURE — 1101F PT FALLS ASSESS-DOCD LE1/YR: CPT | Mod: CPTII,S$GLB,, | Performed by: INTERNAL MEDICINE

## 2020-11-09 PROCEDURE — 1101F PR PT FALLS ASSESS DOC 0-1 FALLS W/OUT INJ PAST YR: ICD-10-PCS | Mod: CPTII,S$GLB,, | Performed by: INTERNAL MEDICINE

## 2020-11-09 RX ORDER — HYDROCODONE BITARTRATE AND ACETAMINOPHEN 5; 325 MG/1; MG/1
TABLET ORAL
COMMUNITY
Start: 2020-08-10 | End: 2021-01-04

## 2020-11-09 NOTE — PROGRESS NOTES
Chief C/o:    Chronic Kidney Disease (Lab results check.), Hyperlipidemia, Pre-diabetes, and COPD        Health Care Maintenance    Health Maintenance       Date Due Completion Date    Aspirin/Antiplatelet Therapy 11/09/2021 11/9/2020    Colonoscopy 07/22/2023 7/22/2020    Override on 2/2/2018: Done    Override on 9/9/2013: Done    TETANUS VACCINE 04/25/2024 4/25/2014    Lipid Panel 11/05/2025 11/5/2020    Override on 9/5/2019: Done                 HISTORY OF PRESENT ILLNESS:    HPI    Geronimo Gu is a 80 y.o. male who presents to the clinic today for Chronic Kidney Disease (Lab results check.), Hyperlipidemia, Pre-diabetes, and COPD  .  Patient is feeling well in general, he has no chest pain, no shortness of breath, no nausea, no fever, no chills.  He is coming today for a scheduled visit as well as for annual wellness visit, and ACP was discussed with him as well.  Patient had labs that he did fasting.                  ALLERGIES AND MEDICATIONS: updated and reviewed.  Review of patient's allergies indicates:   Allergen Reactions    Lipitor [atorvastatin]      Pain/cramping    Pcn [penicillins] Hives    Pravastatin      Pain/cramping    Simvastatin     Statins-hmg-coa reductase inhibitors      Medication List with Changes/Refills   Current Medications    ALLOPURINOL (ZYLOPRIM) 100 MG TABLET        AMLODIPINE (NORVASC) 5 MG TABLET        ASPIRIN (ECOTRIN) 81 MG EC TABLET    Take 81 mg by mouth once daily.    CO-ENZYME Q-10 30 MG CAPSULE    Take 30 mg by mouth 3 (three) times daily.    ESOMEPRAZOLE (NEXIUM) 20 MG CAPSULE        EVOLOCUMAB (REPATHA SURECLICK) 140 MG/ML PNIJ    Inject into the skin.    FINASTERIDE (PROSCAR) 5 MG TABLET    TAKE 1 TABLET EVERY DAY    HYDROCHLOROTHIAZIDE (HYDRODIURIL) 25 MG TABLET        HYDROCODONE-ACETAMINOPHEN (NORCO) 5-325 MG PER TABLET    TK 1 TO 2 TS PO Q 6 H PRN    METOPROLOL SUCCINATE (TOPROL-XL) 25 MG 24 HR TABLET    12.5 mg once daily.     PYLERA 140-125-125 MG PER  CAPSULE    TK 3 CS PO QID FOR 10 DAYS    TAMSULOSIN (FLOMAX) 0.4 MG CAP    Take 1 capsule (0.4 mg total) by mouth once daily.    VENTOLIN HFA 90 MCG/ACTUATION INHALER                 CARE TEAM:    Patient Care Team:  Paresh Escobedo MD as PCP - General (Internal Medicine)         REVIEW OF SYSTEMS:    Review of Systems   Constitutional: Negative for appetite change, chills, diaphoresis, fatigue, fever and unexpected weight change.   HENT: Negative for congestion, drooling, ear discharge, ear pain, facial swelling, hearing loss, nosebleeds, rhinorrhea, sinus pain, sneezing, sore throat, tinnitus, trouble swallowing and voice change.    Eyes: Negative for pain, discharge, redness, itching and visual disturbance.   Respiratory: Negative for cough, choking, chest tightness, shortness of breath, wheezing and stridor.    Cardiovascular: Negative for chest pain, palpitations and leg swelling.        Claudication of legs with exertion.   Gastrointestinal: Negative for abdominal distention, abdominal pain, blood in stool, constipation, diarrhea, nausea and vomiting.   Endocrine: Negative for cold intolerance, heat intolerance, polydipsia, polyphagia and polyuria.   Genitourinary: Negative for difficulty urinating, dysuria, flank pain, frequency, hematuria and urgency.   Musculoskeletal: Positive for arthralgias. Negative for back pain, gait problem, joint swelling, myalgias, neck pain and neck stiffness.   Skin: Negative for color change, pallor, rash and wound.   Allergic/Immunologic: Negative for environmental allergies, food allergies and immunocompromised state.   Neurological: Negative for dizziness, tremors, seizures, syncope, speech difficulty, weakness, light-headedness, numbness and headaches.   Hematological: Negative for adenopathy. Does not bruise/bleed easily.   Psychiatric/Behavioral: Negative for agitation, behavioral problems, confusion, decreased concentration, dysphoric mood, hallucinations, sleep  "disturbance and suicidal ideas. The patient is not nervous/anxious.          PHYSICAL EXAM:    Vitals:    11/09/20 0827   BP: 127/72   Pulse: (!) 49   Temp: 97.2 °F (36.2 °C)     Weight: 83.7 kg (184 lb 6.6 oz)   Height: 5' 7" (170.2 cm)   Body mass index is 28.88 kg/m².  Vitals:    11/09/20 0827   BP: 127/72   Pulse: (!) 49   Temp: 97.2 °F (36.2 °C)   TempSrc: Temporal   Weight: 83.7 kg (184 lb 6.6 oz)   Height: 5' 7" (1.702 m)   PainSc: 0-No pain          Physical Exam   Constitutional: He is oriented to person, place, and time. He appears well-developed and well-nourished.  Non-toxic appearance. He does not appear ill. No distress.   Patient is overweight, his comfortable, cooperative, in no obvious distress.   HENT:   Head: Normocephalic and atraumatic.   Right Ear: Tympanic membrane, external ear and ear canal normal.   Left Ear: Tympanic membrane, external ear and ear canal normal.   Nose: Nose normal. No rhinorrhea or nasal congestion.   Mouth/Throat: Oropharynx is clear and moist. Mucous membranes are moist. No oropharyngeal exudate or posterior oropharyngeal erythema. Oropharynx is clear.   Eyes: Pupils are equal, round, and reactive to light. Conjunctivae are normal. Right eye exhibits no discharge. Left eye exhibits no discharge. No scleral icterus.   Neck: Normal range of motion. Neck supple. No JVD present. No muscular tenderness present. No neck rigidity. No tracheal deviation present. No thyromegaly present.   Cardiovascular: Normal rate, regular rhythm, normal heart sounds and normal pulses. Exam reveals no gallop and no friction rub.   No murmur heard.  Pulmonary/Chest: Effort normal and breath sounds normal. No stridor. No respiratory distress. He has no wheezes. He has no rhonchi. He has no rales. He exhibits no tenderness.   Abdominal: Soft. Normal appearance and bowel sounds are normal. He exhibits no distension and no mass. There is no abdominal tenderness. There is no rebound and no guarding. " No hernia.   Genitourinary:    Genitourinary Comments: No costovertebral angle tenderness.     Musculoskeletal: Normal range of motion.         General: No swelling, tenderness, deformity or signs of injury.      Right lower leg: No edema.      Left lower leg: No edema.      Comments: Crepitation knees with no swelling no tenderness   Lymphadenopathy:     He has no cervical adenopathy.   Neurological: He is alert and oriented to person, place, and time. He displays no weakness and normal reflexes. No cranial nerve deficit or sensory deficit. He exhibits normal muscle tone. Coordination normal.   Skin: Skin is warm and dry. Capillary refill takes less than 2 seconds. No bruising, no lesion and no rash noted. He is not diaphoretic. No erythema. No jaundice or pallor.   Psychiatric: His behavior is normal. Mood, judgment and thought content normal.   Nursing note and vitals reviewed.         Labs:  Discussed with patient    Lab Results   Component Value Date    GLU 92 11/05/2020     11/05/2020    K 4.2 11/05/2020     11/05/2020    CO2 34 (H) 11/05/2020    BUN 24 11/05/2020    CREATININE 1.02 11/05/2020    CALCIUM 9.3 11/05/2020    PROT 6.1 11/05/2020    ALBUMIN 3.8 11/05/2020    BILITOT 0.7 11/05/2020    AST 18 11/05/2020    ALT 24 11/05/2020    ESTGFRAFRICA 79 11/05/2020    EGFRNONAA 68 11/05/2020     Lab Results   Component Value Date    WBC 5.3 11/05/2020    RBC 4.43 11/05/2020    HGB 13.7 11/05/2020    HCT 39.7 11/05/2020    MCV 89.6 11/05/2020    RDW 12.6 11/05/2020     11/05/2020      Lab Results   Component Value Date    CHOL 128 11/05/2020    TRIG 37 11/05/2020    HDL 54 11/05/2020    LDLCALC 63 11/05/2020     No results found for: TSH  Lab Results   Component Value Date    HGBA1C 5.2 11/05/2020      No components found for: MICROALBUMIN/CREATININE    ASSESSMENT & PLAN:    1. Hypertensive chronic kidney disease with stage 1 through stage 4 chronic kidney disease, or unspecified chronic kidney  disease  Patient blood pressure is fairly well controlled, will continue his current medications, amlodipine at 5 mg a day, hydrochlorothiazide 25 mg once a day, and metoprolol succinate1 .25 mg a day.  Diet exercise and weight loss were encouraged.  General measures: low salt, low fat, vegetables and fruits rich diet. Check BP before taking BP medications and follow precautions and guidelines. Keep a records of your BP and Pulse readings and bring the chart to the office next visit. If BP is consistently above 130/80, I recommend that you book a sooner appointment to address the issue.    2. Chronic kidney disease, stage 2 (mild)  Mild chronic kidney disease stage 2, stable.  3. Mixed hyperlipidemia  Patient's lipids are well controlled on Repatha 140 mg injection every 2 weeks.  Will continue same.  In addition to diet exercise and weight loss.  4. PAD (peripheral artery disease)  Patient is asymptomatic currently, he has been followed by cardiologist.  5. Coronary artery disease involving native coronary artery of native heart without angina pectoris  Asymptomatic, continue follow-up with cardiologist  6. Occlusion and stenosis of bilateral carotid arteries  As above  7. Bradycardia  Asymptomatic bradycardia, he has a small dose of beta-blocker, continue follow-up with cardiologist.  8. Chronic obstructive pulmonary disease, unspecified COPD type  Asymptomatic.  On Ventolin on p.r.n. basis  9. PVC (premature ventricular contraction)  Asymptomatic on current treatment  10. Benign prostatic hyperplasia with urinary obstruction  On Flomax and Proscar, and continue follow-up with urologist.  11. Impotence of organic origin  As above  12. Nocturia  As above  13. Other obstructive and reflux uropathy  As above  14. Personal history of urinary calculi  Asymptomatic and he is followed by urology  15. BMI 28.0-28.9,adult  Low-fat diet, increase vegetables, learn how to count calories, check weight every morning and keep a  diet and weight diary.  Bring the diary next visit.  Try to identify one specific food item or habit that you can improve, try to stick to it and add another item after 2-4 weeks interval. If you are not improving as you wish, bring your food diary and we will try, together, find something specific that we can work on.    16. Prediabetes  Patient's blood sugar is in the range of prediabetes, as well as the hemoglobin A1c.  Diet, exercise and weight loss were encouraged to prevent progression to full-blown diabetes mellitus    17. Gastro-esophageal reflux disease without esophagitis  Asymptomatic  18. Chronic low back pain, unspecified back pain laterality, unspecified whether sciatica present  Pain is fairly controlled with current medications continue same  19. 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  Continue on allopurinol  20. Polyarticular osteoarthritis    21. Statin intolerance    22. Other reduced mobility       Patient is stable in general.  Subsequent annual wellness was included with this visit.  Advance care planning was addressed and patient was given a package containing forms a tablets that is needed.        No orders of the defined types were placed in this encounter.     No follow-ups on file. or sooner as needed.    Patient was counseled and questions and concerns were addressed.    Please note:  Parts of this report were done using a dictation software, voice to text, and sometimes the text contains some uncorrected words or sentences that are missed during revision.

## 2020-11-09 NOTE — PATIENT INSTRUCTIONS
Understanding Advance Care Planning  Advance care planning is the process of deciding ones own future medical care. It helps to make sure that if you cant speak for yourself, your wishes can still be carried out. The plan is a series of legal documents that note a persons wishes. The documents vary by state. Advance care planning should be discussed at a regular office visit with your primary care provider before an acute illness. Advance care planning is encouraged when a person has a serious illness that is expected to get worse. It may also be done before major surgery. And it can help you and your family be prepared in case of a major illness or injury. Advance care planning helps with making decisions at these times.    A healthcare proxy is a person who acts as the voice of a patient when the patient cant speak for himself or herself. The name of this role varies by state. It may be called a Durable Medical Power of  or Durable Power of  for Healthcare. It may be called an agent, surrogate, or advocate. Or it may be called a representative or decision maker. It is an official duty that is identified by a legal document. The document also varies by state.   Why is advance care planning important?  If a person communicates his or her healthcare wishes:  · He or she will be given medical care that matches his or her values and goals.  · Family members will not be forced to make decisions in a crisis with no guidance.  Creating a plan  Making an advance care plan is often done in 3 steps:  · Thinking about ones wishes. To create an advance care plan, you should think about what kind of medical treatment you would want if you lose the ability to communicate. Are there any situations in which you would refuse or stop treatment? Are there therapies you would want or not want? And whom do you want to make decisions for you? There are many places to learn more about how to plan for your care. Ask  your healthcare provider or  for resources.  · Picking a healthcare proxy. This means choosing a trusted person to speak for you only when you cant speak for yourself. When you cannot make medical decisions, your proxy makes sure the instructions in your advance care plan are followed. A proxy does not make decisions based on his or her own opinions. They must put aside those opinions and values if needed, and carry out your wishes.  · Filling out the legal documents. There are several kinds of legal documents for advance care planning. Each one tells healthcare providers your wishes. The documents may vary by state. They must be signed and may need to be witnessed or notarized. You can cancel or change them whenever you wish. Depending on your state, the documents may include a Healthcare Proxy form, Living Will, Durable Medical Power of , Advance Directive, or others.  The familys role  The best help a family can give is to support their loved ones wishes. Open and honest communication is vital. Family should express any concerns they have about the patients choices while the patient can still make decisions in the event that his or her illness prevents him or her from communicating those wishes at a later time.   Date Last Reviewed: 4/1/2017  © 9373-0545 [employer]. [address] [place] [address], [place] [address]. All rights reserved. This information is not intended as a substitute for professional medical care. Always follow your healthcare professional's instructions.        Prevention Guidelines, Men Ages 65 and Older  Screening tests and vaccines are an important part of managing your health. Health counseling is essential, too. Below are guidelines for these, for men ages 65 and older. Talk with your healthcare provider to make sure youre up-to-date on what you need.  Screening Who needs it How often   Abdominal aortic aneurysm Men ages 65 to 75 who have ever smoked 1  ultrasound   Alcohol misuse All men in this age group At routine exams   Blood pressure All men in this age group Every 2 years if your blood pressure is less than 120/80 mm Hg; yearly if your systolic blood pressure is 120 to 139 mm Hg, or your diastolic blood pressure reading is 80 to 89 mm Hg   Colorectal cancer All men in this age group Flexible sigmoidoscopy every 5 years, or colonoscopy every 10 years, or double-contrast barium enema every 5 years; yearly fecal occult blood test or fecal immunochemical test; or a stool DNA test as often as your healthcare provider advises; talk with your healthcare provider about which tests are best for you and when you no longer need colonoscopies (generally after age 75)   Depression All men in this age group At routine exams   Type 2 diabetes or prediabetes All adults beginning at age 45 and adults without symptoms at any age who are overweight or obese and have 1 or more other risk factors for diabetes At least every 3 years (yearly if your blood sugar has already begun to rise)   Hepatitis C Men at increased risk for infection - talk with your healthcare provider At routine exams   High cholesterol or triglycerides All men in this age group At least every 5 years   HIV Men at increased risk for infection - talk with your healthcare provider At routine exams   Lung cancer Adults ages 55 to 80 who have smoked Yearly screening in smokers with 30 pack-year history of smoking or who quit within 15 years   Obesity All men in this age group At routine exams   Prostate cancer All men in this age group, talk to healthcare provider about risks and benefits of digital rectal exam (JOLEEN) and prostate-specific antigen (PSA) screening1 At routine exams   Syphilis Men at increased risk for infection - talk with your healthcare provider At routine exams   Tuberculosis Men at increased risk for infection - talk with your healthcare provider Ask your healthcare provider   Vision All men in  this age group Every 1 to 2 years; if you have a chronic health condition, ask your healthcare provider if you needs exams more often   Vaccine Who needs it How often   Chickenpox (varicella) All men in this age group who have no record of this infection or vaccine 2 doses; second dose should be given at least 4 weeks after the first dose   Hepatitis A Men at increased risk for infection - talk with your healthcare provider 2 doses given at least 6 months apart   Hepatitis B Men at increased risk for infection - talk with your healthcare provider 3 doses over 6 months; second dose should be given 1 month after the first dose; the third dose should be given at least 2 months after the second dose and at least 4 months after the first dose   Haemophilus influenzae Type B (HIB) Men at increased risk for infection - talk with your healthcare provider 1 to 3 doses   Influenza (flu) All men in this age group  Once a year   Meningococcal Men at increased risk for infection - talk with your healthcare provider 1 or more doses   Pneumococcal conjugate vaccine (PCV13) and pneumococcal polysaccharide vaccine (PPSV23) All men in this age group 1 dose of each vaccine   Tetanus/diphtheria/  pertussis (Td/Tdap) booster All men in this age group Td every 10 years, or Tdap if you will have contact with a child younger than 12 months old   Zoster All men in this age group 1 dose   Counseling Who needs it How often   Diet and exercise Men who are overweight or obese When diagnosed, and then at routine exams   Fall prevention (exercise, vitamin D supplements) All men in this age group At routine exams   Sexually transmitted infection Men at increased risk for infection - talk with your healthcare provider At routine exams   Use of daily aspirin Men ages 45 to 79 at risk for cardiovascular health problems At routine exams   Use of tobacco and the health effects it can cause All men in this age group Every visit   1National Comprehensive  Cancer Network   Date Last Reviewed: 2/1/2017  © 5870-0382 [employer], [employer]. [address] [place] [address], [place] [address]. All rights reserved. This information is not intended as a substitute for professional medical care. Always follow your healthcare professional's instructions.

## 2020-11-09 NOTE — ACP (ADVANCE CARE PLANNING)
"  Advance Care Planning/ ACP was discussed with patient face-to-face.    I initiated the discussion with this patient about ADVANCE CARE PLANNING/ ACP.   The patient was not accompanied by any family member or friend.  The concept of Advance Care Planning/ACP was explained, and patient reverberated understanding.  The patient was informed that participation in this discussion about ACP is absolutely voluntary, and is not mandatory.    Topics explains and discussed: Advance Directive/ " Medical Living Will", Health Care Proxy/ "Health Care Durable Power of , and Do Not Resuscitate/ Do not Intubate.   A package of Educational  material, Forms, and Templates, was given to the patient, as well as online resources addresses.  Patient was informed that 'patients have the right to change their minds at any time".  Patients can call or come in person to the office for help and/ or for questions that may arise. And in case hel is needed for filling any of the forms.    At the end of today's visit, patient:     -will discuss ACP with family first, then decide.    The Education material that was given to the patient is including:  [place] ADVANCE DIRECTIVE PLANNING FOR HEALTH CARE DECISIONS.      Time spent was less than 30 minutes, circa 15 minutes.      "

## 2020-11-09 NOTE — PROGRESS NOTES
"  Geronimo Gu presented for subsequent Medicare AWV today. The following components were reviewed and updated:    · Medical history  · Family History  · Social history  · Allergies and Current Medications  · Health Risk Assessment  · Health Maintenance  · Care Team    **See Completed Assessments for Annual Wellness visit with in the encounter summary    The following assessments were completed:  · Depression Screening  · Cognitive function Screening  · Timed Get Up Test  ·     Vitals:    11/09/20 0827   BP: 127/72   BP Location: Left arm   Patient Position: Sitting   BP Method: Large (Automatic)   Pulse: (!) 49   Temp: 97.2 °F (36.2 °C)   TempSrc: Temporal   Weight: 83.7 kg (184 lb 6.6 oz)   Height: 5' 7" (1.702 m)     Body mass index is 28.88 kg/m².   ]    Paper pains questionnaires and documents were filled and scanned into media section.  Or  See visit for diagnosis and medication.    Diagnoses and health risks identified today and associated recommendations/orders:      Provided Geronimo with a 5-10 year written screening schedule and personal prevention plan. Recommendations were developed using the USPSTF age appropriate recommendations. Education, counseling, and referrals were provided as needed.  After Visit Summary printed and given to patient which includes a list of additional screenings\tests needed.    No follow-ups on file.      Paresh Escobedo MD  "

## 2021-01-04 ENCOUNTER — OFFICE VISIT (OUTPATIENT)
Dept: INTERNAL MEDICINE | Facility: CLINIC | Age: 81
End: 2021-01-04
Payer: MEDICARE

## 2021-01-04 VITALS
DIASTOLIC BLOOD PRESSURE: 53 MMHG | HEART RATE: 48 BPM | BODY MASS INDEX: 29.1 KG/M2 | HEIGHT: 67 IN | SYSTOLIC BLOOD PRESSURE: 112 MMHG | TEMPERATURE: 98 F | WEIGHT: 185.44 LBS

## 2021-01-04 DIAGNOSIS — N18.2 CHRONIC KIDNEY DISEASE, STAGE 2 (MILD): ICD-10-CM

## 2021-01-04 DIAGNOSIS — R35.1 NOCTURIA: ICD-10-CM

## 2021-01-04 DIAGNOSIS — G89.29 CHRONIC LOW BACK PAIN, UNSPECIFIED BACK PAIN LATERALITY, UNSPECIFIED WHETHER SCIATICA PRESENT: ICD-10-CM

## 2021-01-04 DIAGNOSIS — I25.10 CORONARY ARTERY DISEASE INVOLVING NATIVE CORONARY ARTERY OF NATIVE HEART WITHOUT ANGINA PECTORIS: ICD-10-CM

## 2021-01-04 DIAGNOSIS — G56.03 CARPAL TUNNEL SYNDROME, BILATERAL UPPER LIMBS: ICD-10-CM

## 2021-01-04 DIAGNOSIS — E79.0 HYPERURICEMIA WITHOUT SIGNS OF INFLAMMATORY ARTHRITIS AND TOPHACEOUS DISEASE: ICD-10-CM

## 2021-01-04 DIAGNOSIS — E78.2 MIXED HYPERLIPIDEMIA: ICD-10-CM

## 2021-01-04 DIAGNOSIS — Z87.442 PERSONAL HISTORY OF URINARY CALCULI: ICD-10-CM

## 2021-01-04 DIAGNOSIS — I12.9 HYPERTENSIVE CHRONIC KIDNEY DISEASE WITH STAGE 1 THROUGH STAGE 4 CHRONIC KIDNEY DISEASE, OR UNSPECIFIED CHRONIC KIDNEY DISEASE: Primary | ICD-10-CM

## 2021-01-04 DIAGNOSIS — N13.8 OTHER OBSTRUCTIVE AND REFLUX UROPATHY: ICD-10-CM

## 2021-01-04 DIAGNOSIS — R00.1 BRADYCARDIA: ICD-10-CM

## 2021-01-04 DIAGNOSIS — K21.9 GASTRO-ESOPHAGEAL REFLUX DISEASE WITHOUT ESOPHAGITIS: ICD-10-CM

## 2021-01-04 DIAGNOSIS — M89.319 CLAVICLE ENLARGEMENT: ICD-10-CM

## 2021-01-04 DIAGNOSIS — Z74.09 OTHER REDUCED MOBILITY: ICD-10-CM

## 2021-01-04 DIAGNOSIS — M54.50 CHRONIC LOW BACK PAIN, UNSPECIFIED BACK PAIN LATERALITY, UNSPECIFIED WHETHER SCIATICA PRESENT: ICD-10-CM

## 2021-01-04 DIAGNOSIS — J44.9 CHRONIC OBSTRUCTIVE PULMONARY DISEASE, UNSPECIFIED COPD TYPE: ICD-10-CM

## 2021-01-04 DIAGNOSIS — E87.5 HYPERKALEMIA: ICD-10-CM

## 2021-01-04 DIAGNOSIS — M15.9 POLYARTICULAR OSTEOARTHRITIS: ICD-10-CM

## 2021-01-04 DIAGNOSIS — N40.1 BENIGN PROSTATIC HYPERPLASIA WITH URINARY OBSTRUCTION: ICD-10-CM

## 2021-01-04 DIAGNOSIS — I73.9 PAD (PERIPHERAL ARTERY DISEASE): ICD-10-CM

## 2021-01-04 DIAGNOSIS — N13.8 BENIGN PROSTATIC HYPERPLASIA WITH URINARY OBSTRUCTION: ICD-10-CM

## 2021-01-04 DIAGNOSIS — I65.23 OCCLUSION AND STENOSIS OF BILATERAL CAROTID ARTERIES: ICD-10-CM

## 2021-01-04 DIAGNOSIS — I49.3 PVC (PREMATURE VENTRICULAR CONTRACTION): ICD-10-CM

## 2021-01-04 DIAGNOSIS — R73.03 PREDIABETES: ICD-10-CM

## 2021-01-04 DIAGNOSIS — Z78.9 STATIN INTOLERANCE: ICD-10-CM

## 2021-01-04 DIAGNOSIS — N52.9 IMPOTENCE OF ORGANIC ORIGIN: ICD-10-CM

## 2021-01-04 PROCEDURE — 3074F PR MOST RECENT SYSTOLIC BLOOD PRESSURE < 130 MM HG: ICD-10-PCS | Mod: CPTII,S$GLB,, | Performed by: INTERNAL MEDICINE

## 2021-01-04 PROCEDURE — 1125F PR PAIN SEVERITY QUANTIFIED, PAIN PRESENT: ICD-10-PCS | Mod: S$GLB,,, | Performed by: INTERNAL MEDICINE

## 2021-01-04 PROCEDURE — 1157F PR ADVANCE CARE PLAN OR EQUIV PRESENT IN MEDICAL RECORD: ICD-10-PCS | Mod: S$GLB,,, | Performed by: INTERNAL MEDICINE

## 2021-01-04 PROCEDURE — 3078F PR MOST RECENT DIASTOLIC BLOOD PRESSURE < 80 MM HG: ICD-10-PCS | Mod: CPTII,S$GLB,, | Performed by: INTERNAL MEDICINE

## 2021-01-04 PROCEDURE — 1125F AMNT PAIN NOTED PAIN PRSNT: CPT | Mod: S$GLB,,, | Performed by: INTERNAL MEDICINE

## 2021-01-04 PROCEDURE — 1159F MED LIST DOCD IN RCRD: CPT | Mod: S$GLB,,, | Performed by: INTERNAL MEDICINE

## 2021-01-04 PROCEDURE — 3074F SYST BP LT 130 MM HG: CPT | Mod: CPTII,S$GLB,, | Performed by: INTERNAL MEDICINE

## 2021-01-04 PROCEDURE — 1101F PR PT FALLS ASSESS DOC 0-1 FALLS W/OUT INJ PAST YR: ICD-10-PCS | Mod: CPTII,S$GLB,, | Performed by: INTERNAL MEDICINE

## 2021-01-04 PROCEDURE — 3288F FALL RISK ASSESSMENT DOCD: CPT | Mod: CPTII,S$GLB,, | Performed by: INTERNAL MEDICINE

## 2021-01-04 PROCEDURE — 99214 PR OFFICE/OUTPT VISIT, EST, LEVL IV, 30-39 MIN: ICD-10-PCS | Mod: S$GLB,,, | Performed by: INTERNAL MEDICINE

## 2021-01-04 PROCEDURE — 3288F PR FALLS RISK ASSESSMENT DOCUMENTED: ICD-10-PCS | Mod: CPTII,S$GLB,, | Performed by: INTERNAL MEDICINE

## 2021-01-04 PROCEDURE — 99214 OFFICE O/P EST MOD 30 MIN: CPT | Mod: S$GLB,,, | Performed by: INTERNAL MEDICINE

## 2021-01-04 PROCEDURE — 1101F PT FALLS ASSESS-DOCD LE1/YR: CPT | Mod: CPTII,S$GLB,, | Performed by: INTERNAL MEDICINE

## 2021-01-04 PROCEDURE — 3078F DIAST BP <80 MM HG: CPT | Mod: CPTII,S$GLB,, | Performed by: INTERNAL MEDICINE

## 2021-01-04 PROCEDURE — 1159F PR MEDICATION LIST DOCUMENTED IN MEDICAL RECORD: ICD-10-PCS | Mod: S$GLB,,, | Performed by: INTERNAL MEDICINE

## 2021-01-04 PROCEDURE — 1157F ADVNC CARE PLAN IN RCRD: CPT | Mod: S$GLB,,, | Performed by: INTERNAL MEDICINE

## 2021-02-10 ENCOUNTER — OFFICE VISIT (OUTPATIENT)
Dept: INTERNAL MEDICINE | Facility: CLINIC | Age: 81
End: 2021-02-10
Payer: MEDICARE

## 2021-02-10 VITALS
HEIGHT: 67 IN | SYSTOLIC BLOOD PRESSURE: 114 MMHG | BODY MASS INDEX: 29.13 KG/M2 | TEMPERATURE: 98 F | DIASTOLIC BLOOD PRESSURE: 67 MMHG | HEART RATE: 50 BPM | WEIGHT: 185.63 LBS

## 2021-02-10 DIAGNOSIS — N40.1 BENIGN PROSTATIC HYPERPLASIA WITH URINARY OBSTRUCTION: ICD-10-CM

## 2021-02-10 DIAGNOSIS — R73.03 PREDIABETES: ICD-10-CM

## 2021-02-10 DIAGNOSIS — N52.9 IMPOTENCE OF ORGANIC ORIGIN: ICD-10-CM

## 2021-02-10 DIAGNOSIS — G89.29 CHRONIC LOW BACK PAIN, UNSPECIFIED BACK PAIN LATERALITY, UNSPECIFIED WHETHER SCIATICA PRESENT: ICD-10-CM

## 2021-02-10 DIAGNOSIS — N18.2 CHRONIC KIDNEY DISEASE, STAGE 2 (MILD): ICD-10-CM

## 2021-02-10 DIAGNOSIS — M54.50 CHRONIC LOW BACK PAIN, UNSPECIFIED BACK PAIN LATERALITY, UNSPECIFIED WHETHER SCIATICA PRESENT: ICD-10-CM

## 2021-02-10 DIAGNOSIS — M15.9 POLYARTICULAR OSTEOARTHRITIS: ICD-10-CM

## 2021-02-10 DIAGNOSIS — I25.10 CORONARY ARTERY DISEASE INVOLVING NATIVE CORONARY ARTERY OF NATIVE HEART WITHOUT ANGINA PECTORIS: ICD-10-CM

## 2021-02-10 DIAGNOSIS — Z74.09 OTHER REDUCED MOBILITY: ICD-10-CM

## 2021-02-10 DIAGNOSIS — R00.1 BRADYCARDIA: ICD-10-CM

## 2021-02-10 DIAGNOSIS — I65.23 OCCLUSION AND STENOSIS OF BILATERAL CAROTID ARTERIES: ICD-10-CM

## 2021-02-10 DIAGNOSIS — E79.0 HYPERURICEMIA WITHOUT SIGNS OF INFLAMMATORY ARTHRITIS AND TOPHACEOUS DISEASE: ICD-10-CM

## 2021-02-10 DIAGNOSIS — N13.8 OTHER OBSTRUCTIVE AND REFLUX UROPATHY: ICD-10-CM

## 2021-02-10 DIAGNOSIS — Z87.442 PERSONAL HISTORY OF URINARY CALCULI: ICD-10-CM

## 2021-02-10 DIAGNOSIS — I12.9 HYPERTENSIVE CHRONIC KIDNEY DISEASE WITH STAGE 1 THROUGH STAGE 4 CHRONIC KIDNEY DISEASE, OR UNSPECIFIED CHRONIC KIDNEY DISEASE: Primary | ICD-10-CM

## 2021-02-10 DIAGNOSIS — E87.5 HYPERKALEMIA: ICD-10-CM

## 2021-02-10 DIAGNOSIS — N13.8 BENIGN PROSTATIC HYPERPLASIA WITH URINARY OBSTRUCTION: ICD-10-CM

## 2021-02-10 DIAGNOSIS — I49.3 PVC (PREMATURE VENTRICULAR CONTRACTION): ICD-10-CM

## 2021-02-10 DIAGNOSIS — I73.9 PAD (PERIPHERAL ARTERY DISEASE): ICD-10-CM

## 2021-02-10 DIAGNOSIS — J44.9 CHRONIC OBSTRUCTIVE PULMONARY DISEASE, UNSPECIFIED COPD TYPE: ICD-10-CM

## 2021-02-10 DIAGNOSIS — K21.9 GASTRO-ESOPHAGEAL REFLUX DISEASE WITHOUT ESOPHAGITIS: ICD-10-CM

## 2021-02-10 DIAGNOSIS — E78.2 MIXED HYPERLIPIDEMIA: ICD-10-CM

## 2021-02-10 PROCEDURE — 1157F PR ADVANCE CARE PLAN OR EQUIV PRESENT IN MEDICAL RECORD: ICD-10-PCS | Mod: S$GLB,,, | Performed by: INTERNAL MEDICINE

## 2021-02-10 PROCEDURE — 1126F AMNT PAIN NOTED NONE PRSNT: CPT | Mod: S$GLB,,, | Performed by: INTERNAL MEDICINE

## 2021-02-10 PROCEDURE — 1101F PT FALLS ASSESS-DOCD LE1/YR: CPT | Mod: CPTII,S$GLB,, | Performed by: INTERNAL MEDICINE

## 2021-02-10 PROCEDURE — 3288F FALL RISK ASSESSMENT DOCD: CPT | Mod: CPTII,S$GLB,, | Performed by: INTERNAL MEDICINE

## 2021-02-10 PROCEDURE — 1126F PR PAIN SEVERITY QUANTIFIED, NO PAIN PRESENT: ICD-10-PCS | Mod: S$GLB,,, | Performed by: INTERNAL MEDICINE

## 2021-02-10 PROCEDURE — 1157F ADVNC CARE PLAN IN RCRD: CPT | Mod: S$GLB,,, | Performed by: INTERNAL MEDICINE

## 2021-02-10 PROCEDURE — 1159F PR MEDICATION LIST DOCUMENTED IN MEDICAL RECORD: ICD-10-PCS | Mod: S$GLB,,, | Performed by: INTERNAL MEDICINE

## 2021-02-10 PROCEDURE — 3074F SYST BP LT 130 MM HG: CPT | Mod: CPTII,S$GLB,, | Performed by: INTERNAL MEDICINE

## 2021-02-10 PROCEDURE — 1159F MED LIST DOCD IN RCRD: CPT | Mod: S$GLB,,, | Performed by: INTERNAL MEDICINE

## 2021-02-10 PROCEDURE — 3288F PR FALLS RISK ASSESSMENT DOCUMENTED: ICD-10-PCS | Mod: CPTII,S$GLB,, | Performed by: INTERNAL MEDICINE

## 2021-02-10 PROCEDURE — 96160 PR PT FOCUSED HLTH RISK ASSMT: ICD-10-PCS | Mod: S$GLB,,, | Performed by: INTERNAL MEDICINE

## 2021-02-10 PROCEDURE — 99214 PR OFFICE/OUTPT VISIT, EST, LEVL IV, 30-39 MIN: ICD-10-PCS | Mod: 25,S$GLB,, | Performed by: INTERNAL MEDICINE

## 2021-02-10 PROCEDURE — 3078F DIAST BP <80 MM HG: CPT | Mod: CPTII,S$GLB,, | Performed by: INTERNAL MEDICINE

## 2021-02-10 PROCEDURE — 3074F PR MOST RECENT SYSTOLIC BLOOD PRESSURE < 130 MM HG: ICD-10-PCS | Mod: CPTII,S$GLB,, | Performed by: INTERNAL MEDICINE

## 2021-02-10 PROCEDURE — 3078F PR MOST RECENT DIASTOLIC BLOOD PRESSURE < 80 MM HG: ICD-10-PCS | Mod: CPTII,S$GLB,, | Performed by: INTERNAL MEDICINE

## 2021-02-10 PROCEDURE — 1101F PR PT FALLS ASSESS DOC 0-1 FALLS W/OUT INJ PAST YR: ICD-10-PCS | Mod: CPTII,S$GLB,, | Performed by: INTERNAL MEDICINE

## 2021-02-10 PROCEDURE — 99214 OFFICE O/P EST MOD 30 MIN: CPT | Mod: 25,S$GLB,, | Performed by: INTERNAL MEDICINE

## 2021-02-10 PROCEDURE — 96160 PT-FOCUSED HLTH RISK ASSMT: CPT | Mod: S$GLB,,, | Performed by: INTERNAL MEDICINE

## 2021-02-10 RX ORDER — ASPIRIN 81 MG/1
81 TABLET ORAL DAILY
Start: 2021-02-10

## 2021-04-28 ENCOUNTER — PATIENT OUTREACH (OUTPATIENT)
Dept: ADMINISTRATIVE | Facility: HOSPITAL | Age: 81
End: 2021-04-28

## 2021-04-28 RX ORDER — DICYCLOMINE HYDROCHLORIDE 10 MG/1
CAPSULE ORAL
COMMUNITY
Start: 2021-03-05 | End: 2021-05-12

## 2021-04-28 RX ORDER — EVOLOCUMAB 140 MG/ML
1 INJECTION, SOLUTION SUBCUTANEOUS
COMMUNITY
Start: 2021-01-07

## 2021-04-28 RX ORDER — FINASTERIDE 5 MG/1
1 TABLET, FILM COATED ORAL
COMMUNITY
Start: 2020-11-18 | End: 2021-05-12 | Stop reason: SDUPTHER

## 2021-04-28 RX ORDER — ALLOPURINOL 100 MG/1
1 TABLET ORAL
COMMUNITY
Start: 2021-02-24 | End: 2021-05-12 | Stop reason: SDUPTHER

## 2021-04-28 RX ORDER — HYDROCHLOROTHIAZIDE 25 MG/1
TABLET ORAL
COMMUNITY
Start: 2021-04-06

## 2021-05-08 LAB
ALBUMIN SERPL-MCNC: 3.8 G/DL (ref 3.6–5.1)
ALBUMIN/GLOB SERPL: 1.5 (CALC) (ref 1–2.5)
ALP SERPL-CCNC: 97 U/L (ref 35–144)
ALT SERPL-CCNC: 18 U/L (ref 9–46)
AST SERPL-CCNC: 24 U/L (ref 10–35)
BILIRUB SERPL-MCNC: 0.9 MG/DL (ref 0.2–1.2)
BUN SERPL-MCNC: 21 MG/DL (ref 7–25)
BUN/CREAT SERPL: 19 (CALC) (ref 6–22)
CALCIUM SERPL-MCNC: 9.8 MG/DL (ref 8.6–10.3)
CHLORIDE SERPL-SCNC: 103 MMOL/L (ref 98–110)
CHOLEST SERPL-MCNC: 139 MG/DL
CHOLEST/HDLC SERPL: 2.2 (CALC)
CO2 SERPL-SCNC: 32 MMOL/L (ref 20–32)
CREAT SERPL-MCNC: 1.13 MG/DL (ref 0.7–1.11)
GLOBULIN SER CALC-MCNC: 2.6 G/DL (CALC) (ref 1.9–3.7)
GLUCOSE SERPL-MCNC: 96 MG/DL (ref 65–99)
HBA1C MFR BLD: 5.1 % OF TOTAL HGB
HDLC SERPL-MCNC: 64 MG/DL
LDLC SERPL CALC-MCNC: 62 MG/DL (CALC)
NONHDLC SERPL-MCNC: 75 MG/DL (CALC)
POTASSIUM SERPL-SCNC: 5 MMOL/L (ref 3.5–5.3)
PROT SERPL-MCNC: 6.4 G/DL (ref 6.1–8.1)
SODIUM SERPL-SCNC: 142 MMOL/L (ref 135–146)
TRIGL SERPL-MCNC: 44 MG/DL
URATE SERPL-MCNC: 5.5 MG/DL (ref 4–8)

## 2021-05-12 ENCOUNTER — OFFICE VISIT (OUTPATIENT)
Dept: INTERNAL MEDICINE | Facility: CLINIC | Age: 81
End: 2021-05-12
Payer: MEDICARE

## 2021-05-12 VITALS
TEMPERATURE: 98 F | HEART RATE: 53 BPM | BODY MASS INDEX: 28.73 KG/M2 | WEIGHT: 183.06 LBS | SYSTOLIC BLOOD PRESSURE: 113 MMHG | HEIGHT: 67 IN | DIASTOLIC BLOOD PRESSURE: 66 MMHG

## 2021-05-12 DIAGNOSIS — N52.9 IMPOTENCE OF ORGANIC ORIGIN: ICD-10-CM

## 2021-05-12 DIAGNOSIS — I25.10 CORONARY ARTERY DISEASE INVOLVING NATIVE CORONARY ARTERY OF NATIVE HEART WITHOUT ANGINA PECTORIS: ICD-10-CM

## 2021-05-12 DIAGNOSIS — R35.1 NOCTURIA: ICD-10-CM

## 2021-05-12 DIAGNOSIS — I65.23 OCCLUSION AND STENOSIS OF BILATERAL CAROTID ARTERIES: ICD-10-CM

## 2021-05-12 DIAGNOSIS — N13.8 BENIGN PROSTATIC HYPERPLASIA WITH URINARY OBSTRUCTION: ICD-10-CM

## 2021-05-12 DIAGNOSIS — E79.0 HYPERURICEMIA WITHOUT SIGNS OF INFLAMMATORY ARTHRITIS AND TOPHACEOUS DISEASE: ICD-10-CM

## 2021-05-12 DIAGNOSIS — N40.1 BENIGN PROSTATIC HYPERPLASIA WITH URINARY OBSTRUCTION: ICD-10-CM

## 2021-05-12 DIAGNOSIS — Z87.442 PERSONAL HISTORY OF URINARY CALCULI: ICD-10-CM

## 2021-05-12 DIAGNOSIS — E87.5 HYPERKALEMIA: ICD-10-CM

## 2021-05-12 DIAGNOSIS — I49.3 PVC (PREMATURE VENTRICULAR CONTRACTION): ICD-10-CM

## 2021-05-12 DIAGNOSIS — M15.9 POLYARTICULAR OSTEOARTHRITIS: ICD-10-CM

## 2021-05-12 DIAGNOSIS — I12.9 HYPERTENSIVE CHRONIC KIDNEY DISEASE WITH STAGE 1 THROUGH STAGE 4 CHRONIC KIDNEY DISEASE, OR UNSPECIFIED CHRONIC KIDNEY DISEASE: Primary | ICD-10-CM

## 2021-05-12 DIAGNOSIS — M54.50 CHRONIC LOW BACK PAIN, UNSPECIFIED BACK PAIN LATERALITY, UNSPECIFIED WHETHER SCIATICA PRESENT: ICD-10-CM

## 2021-05-12 DIAGNOSIS — Z74.09 OTHER REDUCED MOBILITY: ICD-10-CM

## 2021-05-12 DIAGNOSIS — R73.03 PREDIABETES: ICD-10-CM

## 2021-05-12 DIAGNOSIS — K21.9 GASTRO-ESOPHAGEAL REFLUX DISEASE WITHOUT ESOPHAGITIS: ICD-10-CM

## 2021-05-12 DIAGNOSIS — I73.9 PAD (PERIPHERAL ARTERY DISEASE): ICD-10-CM

## 2021-05-12 DIAGNOSIS — N18.2 CHRONIC KIDNEY DISEASE, STAGE 2 (MILD): ICD-10-CM

## 2021-05-12 DIAGNOSIS — G89.29 CHRONIC LOW BACK PAIN, UNSPECIFIED BACK PAIN LATERALITY, UNSPECIFIED WHETHER SCIATICA PRESENT: ICD-10-CM

## 2021-05-12 DIAGNOSIS — J44.9 CHRONIC OBSTRUCTIVE PULMONARY DISEASE, UNSPECIFIED COPD TYPE: ICD-10-CM

## 2021-05-12 DIAGNOSIS — Z78.9 STATIN INTOLERANCE: ICD-10-CM

## 2021-05-12 DIAGNOSIS — N13.8 OTHER OBSTRUCTIVE AND REFLUX UROPATHY: ICD-10-CM

## 2021-05-12 DIAGNOSIS — E78.2 MIXED HYPERLIPIDEMIA: ICD-10-CM

## 2021-05-12 DIAGNOSIS — R00.1 BRADYCARDIA: ICD-10-CM

## 2021-05-12 PROCEDURE — 3074F PR MOST RECENT SYSTOLIC BLOOD PRESSURE < 130 MM HG: ICD-10-PCS | Mod: CPTII,S$GLB,, | Performed by: INTERNAL MEDICINE

## 2021-05-12 PROCEDURE — 1126F AMNT PAIN NOTED NONE PRSNT: CPT | Mod: S$GLB,,, | Performed by: INTERNAL MEDICINE

## 2021-05-12 PROCEDURE — 1159F PR MEDICATION LIST DOCUMENTED IN MEDICAL RECORD: ICD-10-PCS | Mod: S$GLB,,, | Performed by: INTERNAL MEDICINE

## 2021-05-12 PROCEDURE — 3074F SYST BP LT 130 MM HG: CPT | Mod: CPTII,S$GLB,, | Performed by: INTERNAL MEDICINE

## 2021-05-12 PROCEDURE — 3288F FALL RISK ASSESSMENT DOCD: CPT | Mod: CPTII,S$GLB,, | Performed by: INTERNAL MEDICINE

## 2021-05-12 PROCEDURE — 1101F PT FALLS ASSESS-DOCD LE1/YR: CPT | Mod: CPTII,S$GLB,, | Performed by: INTERNAL MEDICINE

## 2021-05-12 PROCEDURE — 99214 PR OFFICE/OUTPT VISIT, EST, LEVL IV, 30-39 MIN: ICD-10-PCS | Mod: S$GLB,,, | Performed by: INTERNAL MEDICINE

## 2021-05-12 PROCEDURE — 99214 OFFICE O/P EST MOD 30 MIN: CPT | Mod: S$GLB,,, | Performed by: INTERNAL MEDICINE

## 2021-05-12 PROCEDURE — 3288F PR FALLS RISK ASSESSMENT DOCUMENTED: ICD-10-PCS | Mod: CPTII,S$GLB,, | Performed by: INTERNAL MEDICINE

## 2021-05-12 PROCEDURE — 1157F ADVNC CARE PLAN IN RCRD: CPT | Mod: S$GLB,,, | Performed by: INTERNAL MEDICINE

## 2021-05-12 PROCEDURE — 1159F MED LIST DOCD IN RCRD: CPT | Mod: S$GLB,,, | Performed by: INTERNAL MEDICINE

## 2021-05-12 PROCEDURE — 1101F PR PT FALLS ASSESS DOC 0-1 FALLS W/OUT INJ PAST YR: ICD-10-PCS | Mod: CPTII,S$GLB,, | Performed by: INTERNAL MEDICINE

## 2021-05-12 PROCEDURE — 3078F PR MOST RECENT DIASTOLIC BLOOD PRESSURE < 80 MM HG: ICD-10-PCS | Mod: CPTII,S$GLB,, | Performed by: INTERNAL MEDICINE

## 2021-05-12 PROCEDURE — 1157F PR ADVANCE CARE PLAN OR EQUIV PRESENT IN MEDICAL RECORD: ICD-10-PCS | Mod: S$GLB,,, | Performed by: INTERNAL MEDICINE

## 2021-05-12 PROCEDURE — 1126F PR PAIN SEVERITY QUANTIFIED, NO PAIN PRESENT: ICD-10-PCS | Mod: S$GLB,,, | Performed by: INTERNAL MEDICINE

## 2021-05-12 PROCEDURE — 3078F DIAST BP <80 MM HG: CPT | Mod: CPTII,S$GLB,, | Performed by: INTERNAL MEDICINE

## 2021-05-12 RX ORDER — CHOLECALCIFEROL (VITAMIN D3) 50 MCG
2000 TABLET ORAL DAILY
Start: 2021-05-12 | End: 2023-05-30 | Stop reason: SDUPTHER

## 2021-06-28 ENCOUNTER — LAB VISIT (OUTPATIENT)
Dept: LAB | Facility: HOSPITAL | Age: 81
End: 2021-06-28
Attending: UROLOGY
Payer: MEDICARE

## 2021-06-28 DIAGNOSIS — N40.1 BPH WITH OBSTRUCTION/LOWER URINARY TRACT SYMPTOMS: ICD-10-CM

## 2021-06-28 DIAGNOSIS — N13.8 BPH WITH OBSTRUCTION/LOWER URINARY TRACT SYMPTOMS: ICD-10-CM

## 2021-06-28 LAB — COMPLEXED PSA SERPL-MCNC: 0.64 NG/ML (ref 0–4)

## 2021-06-28 PROCEDURE — 36415 COLL VENOUS BLD VENIPUNCTURE: CPT | Mod: PO | Performed by: UROLOGY

## 2021-06-28 PROCEDURE — 84153 ASSAY OF PSA TOTAL: CPT | Performed by: UROLOGY

## 2021-07-02 ENCOUNTER — OFFICE VISIT (OUTPATIENT)
Dept: UROLOGY | Facility: CLINIC | Age: 81
End: 2021-07-02
Payer: MEDICARE

## 2021-07-02 VITALS — WEIGHT: 183 LBS | HEIGHT: 67 IN | BODY MASS INDEX: 28.72 KG/M2

## 2021-07-02 DIAGNOSIS — R35.1 NOCTURIA MORE THAN TWICE PER NIGHT: ICD-10-CM

## 2021-07-02 DIAGNOSIS — N40.1 BPH WITH OBSTRUCTION/LOWER URINARY TRACT SYMPTOMS: Primary | ICD-10-CM

## 2021-07-02 DIAGNOSIS — N13.8 BPH WITH OBSTRUCTION/LOWER URINARY TRACT SYMPTOMS: Primary | ICD-10-CM

## 2021-07-02 DIAGNOSIS — R33.9 INCOMPLETE EMPTYING OF BLADDER: ICD-10-CM

## 2021-07-02 DIAGNOSIS — Z87.442 HISTORY OF KIDNEY STONES: ICD-10-CM

## 2021-07-02 PROCEDURE — 99214 PR OFFICE/OUTPT VISIT, EST, LEVL IV, 30-39 MIN: ICD-10-PCS | Mod: 25,S$GLB,, | Performed by: UROLOGY

## 2021-07-02 PROCEDURE — 99214 OFFICE O/P EST MOD 30 MIN: CPT | Mod: 25,S$GLB,, | Performed by: UROLOGY

## 2021-07-02 PROCEDURE — 1159F MED LIST DOCD IN RCRD: CPT | Mod: S$GLB,,, | Performed by: UROLOGY

## 2021-07-02 PROCEDURE — 81001 URINALYSIS AUTO W/SCOPE: CPT | Mod: S$GLB,,, | Performed by: UROLOGY

## 2021-07-02 PROCEDURE — 99999 PR PBB SHADOW E&M-EST. PATIENT-LVL III: CPT | Mod: PBBFAC,,, | Performed by: UROLOGY

## 2021-07-02 PROCEDURE — 1101F PT FALLS ASSESS-DOCD LE1/YR: CPT | Mod: CPTII,S$GLB,, | Performed by: UROLOGY

## 2021-07-02 PROCEDURE — 3288F PR FALLS RISK ASSESSMENT DOCUMENTED: ICD-10-PCS | Mod: CPTII,S$GLB,, | Performed by: UROLOGY

## 2021-07-02 PROCEDURE — 3288F FALL RISK ASSESSMENT DOCD: CPT | Mod: CPTII,S$GLB,, | Performed by: UROLOGY

## 2021-07-02 PROCEDURE — 1101F PR PT FALLS ASSESS DOC 0-1 FALLS W/OUT INJ PAST YR: ICD-10-PCS | Mod: CPTII,S$GLB,, | Performed by: UROLOGY

## 2021-07-02 PROCEDURE — 1126F PR PAIN SEVERITY QUANTIFIED, NO PAIN PRESENT: ICD-10-PCS | Mod: S$GLB,,, | Performed by: UROLOGY

## 2021-07-02 PROCEDURE — 1126F AMNT PAIN NOTED NONE PRSNT: CPT | Mod: S$GLB,,, | Performed by: UROLOGY

## 2021-07-02 PROCEDURE — 1159F PR MEDICATION LIST DOCUMENTED IN MEDICAL RECORD: ICD-10-PCS | Mod: S$GLB,,, | Performed by: UROLOGY

## 2021-07-02 PROCEDURE — 1157F ADVNC CARE PLAN IN RCRD: CPT | Mod: S$GLB,,, | Performed by: UROLOGY

## 2021-07-02 PROCEDURE — 81001 PR  URINALYSIS, AUTO, W/SCOPE: ICD-10-PCS | Mod: S$GLB,,, | Performed by: UROLOGY

## 2021-07-02 PROCEDURE — 1157F PR ADVANCE CARE PLAN OR EQUIV PRESENT IN MEDICAL RECORD: ICD-10-PCS | Mod: S$GLB,,, | Performed by: UROLOGY

## 2021-07-02 PROCEDURE — 99999 PR PBB SHADOW E&M-EST. PATIENT-LVL III: ICD-10-PCS | Mod: PBBFAC,,, | Performed by: UROLOGY

## 2021-07-22 ENCOUNTER — HOSPITAL ENCOUNTER (OUTPATIENT)
Dept: RADIOLOGY | Facility: HOSPITAL | Age: 81
Discharge: HOME | End: 2021-07-22
Attending: UROLOGY
Payer: MEDICARE

## 2021-07-22 DIAGNOSIS — Z87.442 HISTORY OF KIDNEY STONES: ICD-10-CM

## 2021-07-22 PROCEDURE — 76770 US EXAM ABDO BACK WALL COMP: CPT | Mod: TC

## 2021-07-22 PROCEDURE — 76770 US RETROPERITONEAL COMPLETE: ICD-10-PCS | Mod: 26,,, | Performed by: RADIOLOGY

## 2021-07-22 PROCEDURE — 76770 US EXAM ABDO BACK WALL COMP: CPT | Mod: 26,,, | Performed by: RADIOLOGY

## 2021-08-18 ENCOUNTER — OFFICE VISIT (OUTPATIENT)
Dept: INTERNAL MEDICINE | Facility: CLINIC | Age: 81
End: 2021-08-18
Payer: MEDICARE

## 2021-08-18 VITALS
TEMPERATURE: 97 F | WEIGHT: 183 LBS | HEART RATE: 45 BPM | HEIGHT: 67 IN | BODY MASS INDEX: 28.72 KG/M2 | DIASTOLIC BLOOD PRESSURE: 59 MMHG | SYSTOLIC BLOOD PRESSURE: 106 MMHG

## 2021-08-18 DIAGNOSIS — N40.1 BENIGN PROSTATIC HYPERPLASIA WITH URINARY OBSTRUCTION: ICD-10-CM

## 2021-08-18 DIAGNOSIS — E78.2 MIXED HYPERLIPIDEMIA: ICD-10-CM

## 2021-08-18 DIAGNOSIS — J44.9 CHRONIC OBSTRUCTIVE PULMONARY DISEASE, UNSPECIFIED COPD TYPE: ICD-10-CM

## 2021-08-18 DIAGNOSIS — R00.1 BRADYCARDIA: ICD-10-CM

## 2021-08-18 DIAGNOSIS — G89.29 CHRONIC LOW BACK PAIN, UNSPECIFIED BACK PAIN LATERALITY, UNSPECIFIED WHETHER SCIATICA PRESENT: ICD-10-CM

## 2021-08-18 DIAGNOSIS — I65.23 OCCLUSION AND STENOSIS OF BILATERAL CAROTID ARTERIES: ICD-10-CM

## 2021-08-18 DIAGNOSIS — I25.10 CORONARY ARTERY DISEASE INVOLVING NATIVE CORONARY ARTERY OF NATIVE HEART WITHOUT ANGINA PECTORIS: ICD-10-CM

## 2021-08-18 DIAGNOSIS — M15.9 POLYARTICULAR OSTEOARTHRITIS: ICD-10-CM

## 2021-08-18 DIAGNOSIS — M54.50 CHRONIC LOW BACK PAIN, UNSPECIFIED BACK PAIN LATERALITY, UNSPECIFIED WHETHER SCIATICA PRESENT: ICD-10-CM

## 2021-08-18 DIAGNOSIS — I12.9 HYPERTENSIVE CHRONIC KIDNEY DISEASE WITH STAGE 1 THROUGH STAGE 4 CHRONIC KIDNEY DISEASE, OR UNSPECIFIED CHRONIC KIDNEY DISEASE: Primary | ICD-10-CM

## 2021-08-18 DIAGNOSIS — R73.03 PREDIABETES: ICD-10-CM

## 2021-08-18 DIAGNOSIS — N18.2 CHRONIC KIDNEY DISEASE, STAGE 2 (MILD): ICD-10-CM

## 2021-08-18 DIAGNOSIS — I73.9 PAD (PERIPHERAL ARTERY DISEASE): ICD-10-CM

## 2021-08-18 DIAGNOSIS — E87.5 HYPERKALEMIA: ICD-10-CM

## 2021-08-18 DIAGNOSIS — N13.8 BENIGN PROSTATIC HYPERPLASIA WITH URINARY OBSTRUCTION: ICD-10-CM

## 2021-08-18 PROCEDURE — 1159F MED LIST DOCD IN RCRD: CPT | Mod: CPTII,S$GLB,, | Performed by: INTERNAL MEDICINE

## 2021-08-18 PROCEDURE — 99214 OFFICE O/P EST MOD 30 MIN: CPT | Mod: S$GLB,,, | Performed by: INTERNAL MEDICINE

## 2021-08-18 PROCEDURE — 3074F SYST BP LT 130 MM HG: CPT | Mod: CPTII,S$GLB,, | Performed by: INTERNAL MEDICINE

## 2021-08-18 PROCEDURE — 3288F FALL RISK ASSESSMENT DOCD: CPT | Mod: CPTII,S$GLB,, | Performed by: INTERNAL MEDICINE

## 2021-08-18 PROCEDURE — 1160F RVW MEDS BY RX/DR IN RCRD: CPT | Mod: CPTII,S$GLB,, | Performed by: INTERNAL MEDICINE

## 2021-08-18 PROCEDURE — 3074F PR MOST RECENT SYSTOLIC BLOOD PRESSURE < 130 MM HG: ICD-10-PCS | Mod: CPTII,S$GLB,, | Performed by: INTERNAL MEDICINE

## 2021-08-18 PROCEDURE — 1157F PR ADVANCE CARE PLAN OR EQUIV PRESENT IN MEDICAL RECORD: ICD-10-PCS | Mod: CPTII,S$GLB,, | Performed by: INTERNAL MEDICINE

## 2021-08-18 PROCEDURE — 3288F PR FALLS RISK ASSESSMENT DOCUMENTED: ICD-10-PCS | Mod: CPTII,S$GLB,, | Performed by: INTERNAL MEDICINE

## 2021-08-18 PROCEDURE — 99214 PR OFFICE/OUTPT VISIT, EST, LEVL IV, 30-39 MIN: ICD-10-PCS | Mod: S$GLB,,, | Performed by: INTERNAL MEDICINE

## 2021-08-18 PROCEDURE — 1159F PR MEDICATION LIST DOCUMENTED IN MEDICAL RECORD: ICD-10-PCS | Mod: CPTII,S$GLB,, | Performed by: INTERNAL MEDICINE

## 2021-08-18 PROCEDURE — 3078F DIAST BP <80 MM HG: CPT | Mod: CPTII,S$GLB,, | Performed by: INTERNAL MEDICINE

## 2021-08-18 PROCEDURE — 1126F AMNT PAIN NOTED NONE PRSNT: CPT | Mod: CPTII,S$GLB,, | Performed by: INTERNAL MEDICINE

## 2021-08-18 PROCEDURE — 1160F PR REVIEW ALL MEDS BY PRESCRIBER/CLIN PHARMACIST DOCUMENTED: ICD-10-PCS | Mod: CPTII,S$GLB,, | Performed by: INTERNAL MEDICINE

## 2021-08-18 PROCEDURE — 1101F PR PT FALLS ASSESS DOC 0-1 FALLS W/OUT INJ PAST YR: ICD-10-PCS | Mod: CPTII,S$GLB,, | Performed by: INTERNAL MEDICINE

## 2021-08-18 PROCEDURE — 1157F ADVNC CARE PLAN IN RCRD: CPT | Mod: CPTII,S$GLB,, | Performed by: INTERNAL MEDICINE

## 2021-08-18 PROCEDURE — 3078F PR MOST RECENT DIASTOLIC BLOOD PRESSURE < 80 MM HG: ICD-10-PCS | Mod: CPTII,S$GLB,, | Performed by: INTERNAL MEDICINE

## 2021-08-18 PROCEDURE — 1101F PT FALLS ASSESS-DOCD LE1/YR: CPT | Mod: CPTII,S$GLB,, | Performed by: INTERNAL MEDICINE

## 2021-08-18 PROCEDURE — 1126F PR PAIN SEVERITY QUANTIFIED, NO PAIN PRESENT: ICD-10-PCS | Mod: CPTII,S$GLB,, | Performed by: INTERNAL MEDICINE

## 2021-09-24 DIAGNOSIS — N40.0 BPH WITHOUT URINARY OBSTRUCTION: ICD-10-CM

## 2021-09-24 RX ORDER — FINASTERIDE 5 MG/1
5 TABLET, FILM COATED ORAL NIGHTLY
Qty: 90 TABLET | Refills: 3 | Status: SHIPPED | OUTPATIENT
Start: 2021-09-24 | End: 2022-11-21 | Stop reason: SDUPTHER

## 2021-09-29 ENCOUNTER — TELEPHONE (OUTPATIENT)
Dept: UROLOGY | Facility: CLINIC | Age: 81
End: 2021-09-29

## 2021-10-18 ENCOUNTER — CLINICAL SUPPORT (OUTPATIENT)
Dept: INTERNAL MEDICINE | Facility: CLINIC | Age: 81
End: 2021-10-18
Payer: MEDICARE

## 2021-10-18 DIAGNOSIS — Z23 NEED FOR INFLUENZA VACCINATION: Primary | ICD-10-CM

## 2021-10-18 PROCEDURE — G0008 FLU VACCINE - QUADRIVALENT - ADJUVANTED: ICD-10-PCS | Mod: S$GLB,,, | Performed by: INTERNAL MEDICINE

## 2021-10-18 PROCEDURE — 90694 FLU VACCINE - QUADRIVALENT - ADJUVANTED: ICD-10-PCS | Mod: S$GLB,,, | Performed by: INTERNAL MEDICINE

## 2021-10-18 PROCEDURE — 90694 VACC AIIV4 NO PRSRV 0.5ML IM: CPT | Mod: S$GLB,,, | Performed by: INTERNAL MEDICINE

## 2021-10-18 PROCEDURE — G0008 ADMIN INFLUENZA VIRUS VAC: HCPCS | Mod: S$GLB,,, | Performed by: INTERNAL MEDICINE

## 2021-11-12 LAB
ALBUMIN SERPL-MCNC: 4.1 G/DL (ref 3.6–5.1)
ALBUMIN/GLOB SERPL: 1.6 (CALC) (ref 1–2.5)
ALP SERPL-CCNC: 130 U/L (ref 35–144)
ALT SERPL-CCNC: 41 U/L (ref 9–46)
AST SERPL-CCNC: 21 U/L (ref 10–35)
BILIRUB SERPL-MCNC: 0.7 MG/DL (ref 0.2–1.2)
BUN SERPL-MCNC: 24 MG/DL (ref 7–25)
BUN/CREAT SERPL: NORMAL (CALC) (ref 6–22)
CALCIUM SERPL-MCNC: 9.2 MG/DL (ref 8.6–10.3)
CHLORIDE SERPL-SCNC: 102 MMOL/L (ref 98–110)
CHOLEST SERPL-MCNC: 141 MG/DL
CHOLEST/HDLC SERPL: 2.2 (CALC)
CO2 SERPL-SCNC: 29 MMOL/L (ref 20–32)
CREAT SERPL-MCNC: 1.09 MG/DL (ref 0.7–1.11)
GLOBULIN SER CALC-MCNC: 2.5 G/DL (CALC) (ref 1.9–3.7)
GLUCOSE SERPL-MCNC: 78 MG/DL (ref 65–99)
HBA1C MFR BLD: 5.2 % OF TOTAL HGB
HDLC SERPL-MCNC: 63 MG/DL
LDLC SERPL CALC-MCNC: 69 MG/DL (CALC)
NONHDLC SERPL-MCNC: 78 MG/DL (CALC)
POTASSIUM SERPL-SCNC: 3.9 MMOL/L (ref 3.5–5.3)
PROT SERPL-MCNC: 6.6 G/DL (ref 6.1–8.1)
SODIUM SERPL-SCNC: 140 MMOL/L (ref 135–146)
TRIGL SERPL-MCNC: 31 MG/DL

## 2021-11-17 ENCOUNTER — OFFICE VISIT (OUTPATIENT)
Dept: INTERNAL MEDICINE | Facility: CLINIC | Age: 81
End: 2021-11-17
Payer: MEDICARE

## 2021-11-17 VITALS
BODY MASS INDEX: 29.03 KG/M2 | HEIGHT: 67 IN | WEIGHT: 184.94 LBS | TEMPERATURE: 97 F | HEART RATE: 53 BPM | DIASTOLIC BLOOD PRESSURE: 69 MMHG | SYSTOLIC BLOOD PRESSURE: 119 MMHG

## 2021-11-17 DIAGNOSIS — I25.10 CORONARY ARTERY DISEASE INVOLVING NATIVE CORONARY ARTERY OF NATIVE HEART WITHOUT ANGINA PECTORIS: ICD-10-CM

## 2021-11-17 DIAGNOSIS — R35.1 BENIGN PROSTATIC HYPERPLASIA WITH NOCTURIA: ICD-10-CM

## 2021-11-17 DIAGNOSIS — I65.23 OCCLUSION AND STENOSIS OF BILATERAL CAROTID ARTERIES: ICD-10-CM

## 2021-11-17 DIAGNOSIS — N52.9 IMPOTENCE OF ORGANIC ORIGIN: ICD-10-CM

## 2021-11-17 DIAGNOSIS — J44.9 CHRONIC OBSTRUCTIVE PULMONARY DISEASE, UNSPECIFIED COPD TYPE: ICD-10-CM

## 2021-11-17 DIAGNOSIS — N13.8 OTHER OBSTRUCTIVE AND REFLUX UROPATHY: ICD-10-CM

## 2021-11-17 DIAGNOSIS — N40.1 BENIGN PROSTATIC HYPERPLASIA WITH NOCTURIA: ICD-10-CM

## 2021-11-17 DIAGNOSIS — G89.29 CHRONIC LOW BACK PAIN, UNSPECIFIED BACK PAIN LATERALITY, UNSPECIFIED WHETHER SCIATICA PRESENT: ICD-10-CM

## 2021-11-17 DIAGNOSIS — R73.03 PREDIABETES: ICD-10-CM

## 2021-11-17 DIAGNOSIS — Z87.442 PERSONAL HISTORY OF URINARY CALCULI: ICD-10-CM

## 2021-11-17 DIAGNOSIS — I49.3 PVC (PREMATURE VENTRICULAR CONTRACTION): ICD-10-CM

## 2021-11-17 DIAGNOSIS — Z00.01 ENCOUNTER FOR GENERAL ADULT MEDICAL EXAMINATION WITH ABNORMAL FINDINGS: Primary | ICD-10-CM

## 2021-11-17 DIAGNOSIS — Z78.9 STATIN INTOLERANCE: ICD-10-CM

## 2021-11-17 DIAGNOSIS — E87.5 HYPERKALEMIA: ICD-10-CM

## 2021-11-17 DIAGNOSIS — M15.9 POLYARTICULAR OSTEOARTHRITIS: ICD-10-CM

## 2021-11-17 DIAGNOSIS — Z74.09 OTHER REDUCED MOBILITY: ICD-10-CM

## 2021-11-17 DIAGNOSIS — G56.03 CARPAL TUNNEL SYNDROME, BILATERAL UPPER LIMBS: ICD-10-CM

## 2021-11-17 DIAGNOSIS — E79.0 HYPERURICEMIA WITHOUT SIGNS OF INFLAMMATORY ARTHRITIS AND TOPHACEOUS DISEASE: ICD-10-CM

## 2021-11-17 DIAGNOSIS — K21.9 GASTRO-ESOPHAGEAL REFLUX DISEASE WITHOUT ESOPHAGITIS: ICD-10-CM

## 2021-11-17 DIAGNOSIS — E78.2 MIXED HYPERLIPIDEMIA: ICD-10-CM

## 2021-11-17 DIAGNOSIS — M54.50 CHRONIC LOW BACK PAIN, UNSPECIFIED BACK PAIN LATERALITY, UNSPECIFIED WHETHER SCIATICA PRESENT: ICD-10-CM

## 2021-11-17 DIAGNOSIS — R00.1 BRADYCARDIA: ICD-10-CM

## 2021-11-17 DIAGNOSIS — I73.9 PAD (PERIPHERAL ARTERY DISEASE): ICD-10-CM

## 2021-11-17 DIAGNOSIS — I12.9 HYPERTENSIVE CHRONIC KIDNEY DISEASE WITH STAGE 1 THROUGH STAGE 4 CHRONIC KIDNEY DISEASE, OR UNSPECIFIED CHRONIC KIDNEY DISEASE: ICD-10-CM

## 2021-11-17 DIAGNOSIS — N18.2 CHRONIC KIDNEY DISEASE, STAGE 2 (MILD): ICD-10-CM

## 2021-11-17 PROCEDURE — 1160F PR REVIEW ALL MEDS BY PRESCRIBER/CLIN PHARMACIST DOCUMENTED: ICD-10-PCS | Mod: CPTII,S$GLB,, | Performed by: INTERNAL MEDICINE

## 2021-11-17 PROCEDURE — 99497 ADVNCD CARE PLAN 30 MIN: CPT | Mod: S$GLB,,, | Performed by: INTERNAL MEDICINE

## 2021-11-17 PROCEDURE — 1126F PR PAIN SEVERITY QUANTIFIED, NO PAIN PRESENT: ICD-10-PCS | Mod: CPTII,S$GLB,, | Performed by: INTERNAL MEDICINE

## 2021-11-17 PROCEDURE — 3288F FALL RISK ASSESSMENT DOCD: CPT | Mod: CPTII,S$GLB,, | Performed by: INTERNAL MEDICINE

## 2021-11-17 PROCEDURE — 3288F PR FALLS RISK ASSESSMENT DOCUMENTED: ICD-10-PCS | Mod: CPTII,S$GLB,, | Performed by: INTERNAL MEDICINE

## 2021-11-17 PROCEDURE — G0439 PPPS, SUBSEQ VISIT: HCPCS | Mod: S$GLB,,, | Performed by: INTERNAL MEDICINE

## 2021-11-17 PROCEDURE — 99213 OFFICE O/P EST LOW 20 MIN: CPT | Mod: 25,S$GLB,, | Performed by: INTERNAL MEDICINE

## 2021-11-17 PROCEDURE — 3078F DIAST BP <80 MM HG: CPT | Mod: CPTII,S$GLB,, | Performed by: INTERNAL MEDICINE

## 2021-11-17 PROCEDURE — 99497 PR ADVNCD CARE PLAN 30 MIN: ICD-10-PCS | Mod: S$GLB,,, | Performed by: INTERNAL MEDICINE

## 2021-11-17 PROCEDURE — G0439 PR MEDICARE ANNUAL WELLNESS SUBSEQUENT VISIT: ICD-10-PCS | Mod: S$GLB,,, | Performed by: INTERNAL MEDICINE

## 2021-11-17 PROCEDURE — 3078F PR MOST RECENT DIASTOLIC BLOOD PRESSURE < 80 MM HG: ICD-10-PCS | Mod: CPTII,S$GLB,, | Performed by: INTERNAL MEDICINE

## 2021-11-17 PROCEDURE — 3074F PR MOST RECENT SYSTOLIC BLOOD PRESSURE < 130 MM HG: ICD-10-PCS | Mod: CPTII,S$GLB,, | Performed by: INTERNAL MEDICINE

## 2021-11-17 PROCEDURE — 1159F PR MEDICATION LIST DOCUMENTED IN MEDICAL RECORD: ICD-10-PCS | Mod: CPTII,S$GLB,, | Performed by: INTERNAL MEDICINE

## 2021-11-17 PROCEDURE — 1101F PR PT FALLS ASSESS DOC 0-1 FALLS W/OUT INJ PAST YR: ICD-10-PCS | Mod: CPTII,S$GLB,, | Performed by: INTERNAL MEDICINE

## 2021-11-17 PROCEDURE — 1159F MED LIST DOCD IN RCRD: CPT | Mod: CPTII,S$GLB,, | Performed by: INTERNAL MEDICINE

## 2021-11-17 PROCEDURE — 1126F AMNT PAIN NOTED NONE PRSNT: CPT | Mod: CPTII,S$GLB,, | Performed by: INTERNAL MEDICINE

## 2021-11-17 PROCEDURE — 1123F PR ADV CARE PLAN DISCUSSED, PLAN OR SURROGATE DOCUMENTED: ICD-10-PCS | Mod: CPTII,S$GLB,, | Performed by: INTERNAL MEDICINE

## 2021-11-17 PROCEDURE — 1160F RVW MEDS BY RX/DR IN RCRD: CPT | Mod: CPTII,S$GLB,, | Performed by: INTERNAL MEDICINE

## 2021-11-17 PROCEDURE — 1101F PT FALLS ASSESS-DOCD LE1/YR: CPT | Mod: CPTII,S$GLB,, | Performed by: INTERNAL MEDICINE

## 2021-11-17 PROCEDURE — 1123F ACP DISCUSS/DSCN MKR DOCD: CPT | Mod: CPTII,S$GLB,, | Performed by: INTERNAL MEDICINE

## 2021-11-17 PROCEDURE — 99213 PR OFFICE/OUTPT VISIT, EST, LEVL III, 20-29 MIN: ICD-10-PCS | Mod: 25,S$GLB,, | Performed by: INTERNAL MEDICINE

## 2021-11-17 PROCEDURE — 3074F SYST BP LT 130 MM HG: CPT | Mod: CPTII,S$GLB,, | Performed by: INTERNAL MEDICINE

## 2022-02-03 ENCOUNTER — OFFICE VISIT (OUTPATIENT)
Dept: INTERNAL MEDICINE | Facility: CLINIC | Age: 82
End: 2022-02-03
Payer: MEDICARE

## 2022-02-03 DIAGNOSIS — J06.9 ACUTE URI: ICD-10-CM

## 2022-02-03 DIAGNOSIS — U07.1 COVID-19 VIRUS INFECTION: Primary | ICD-10-CM

## 2022-02-03 DIAGNOSIS — K52.9 GASTROENTERITIS: ICD-10-CM

## 2022-02-03 PROCEDURE — 99213 PR OFFICE/OUTPT VISIT, EST, LEVL III, 20-29 MIN: ICD-10-PCS | Mod: S$GLB,,, | Performed by: INTERNAL MEDICINE

## 2022-02-03 PROCEDURE — 1157F ADVNC CARE PLAN IN RCRD: CPT | Mod: CPTII,S$GLB,, | Performed by: INTERNAL MEDICINE

## 2022-02-03 PROCEDURE — 1160F RVW MEDS BY RX/DR IN RCRD: CPT | Mod: CPTII,S$GLB,, | Performed by: INTERNAL MEDICINE

## 2022-02-03 PROCEDURE — 1101F PR PT FALLS ASSESS DOC 0-1 FALLS W/OUT INJ PAST YR: ICD-10-PCS | Mod: CPTII,S$GLB,, | Performed by: INTERNAL MEDICINE

## 2022-02-03 PROCEDURE — 1159F PR MEDICATION LIST DOCUMENTED IN MEDICAL RECORD: ICD-10-PCS | Mod: CPTII,S$GLB,, | Performed by: INTERNAL MEDICINE

## 2022-02-03 PROCEDURE — 1157F PR ADVANCE CARE PLAN OR EQUIV PRESENT IN MEDICAL RECORD: ICD-10-PCS | Mod: CPTII,S$GLB,, | Performed by: INTERNAL MEDICINE

## 2022-02-03 PROCEDURE — 3288F PR FALLS RISK ASSESSMENT DOCUMENTED: ICD-10-PCS | Mod: CPTII,S$GLB,, | Performed by: INTERNAL MEDICINE

## 2022-02-03 PROCEDURE — 99213 OFFICE O/P EST LOW 20 MIN: CPT | Mod: S$GLB,,, | Performed by: INTERNAL MEDICINE

## 2022-02-03 PROCEDURE — 1159F MED LIST DOCD IN RCRD: CPT | Mod: CPTII,S$GLB,, | Performed by: INTERNAL MEDICINE

## 2022-02-03 PROCEDURE — 1160F PR REVIEW ALL MEDS BY PRESCRIBER/CLIN PHARMACIST DOCUMENTED: ICD-10-PCS | Mod: CPTII,S$GLB,, | Performed by: INTERNAL MEDICINE

## 2022-02-03 PROCEDURE — 1101F PT FALLS ASSESS-DOCD LE1/YR: CPT | Mod: CPTII,S$GLB,, | Performed by: INTERNAL MEDICINE

## 2022-02-03 PROCEDURE — 3288F FALL RISK ASSESSMENT DOCD: CPT | Mod: CPTII,S$GLB,, | Performed by: INTERNAL MEDICINE

## 2022-02-03 RX ORDER — LACTOBACILLUS RHAMNOSUS GG 10B CELL
CAPSULE ORAL
COMMUNITY
End: 2022-05-17

## 2022-02-03 RX ORDER — DICYCLOMINE HYDROCHLORIDE 10 MG/1
10 CAPSULE ORAL
COMMUNITY
End: 2022-05-17

## 2022-02-03 RX ORDER — PROMETHAZINE HYDROCHLORIDE AND DEXTROMETHORPHAN HYDROBROMIDE 6.25; 15 MG/5ML; MG/5ML
5 SYRUP ORAL EVERY 6 HOURS PRN
Qty: 180 ML | Refills: 0 | Status: SHIPPED | OUTPATIENT
Start: 2022-02-03 | End: 2022-02-17 | Stop reason: ALTCHOICE

## 2022-02-03 NOTE — PROGRESS NOTES
The patient location is: home  The chief complaint leading to consultation is: COVID-19 infection, diarrhea and sore throat.    Visit type: audio only    Face to Face time with patient: 20  20 minutes of total time spent on the encounter, which includes face to face time and non-face to face time preparing to see the patient (eg, review of tests), Obtaining and/or reviewing separately obtained history, Documenting clinical information in the electronic or other health record, Independently interpreting results (not separately reported) and communicating results to the patient/family/caregiver, or Care coordination (not separately reported).         Each patient to whom he or she provides medical services by telemedicine is:  (1) informed of the relationship between the physician and patient and the respective role of any other health care provider with respect to management of the patient; and (2) notified that he or she may decline to receive medical services by telemedicine and may withdraw from such care at any time.    Notes:     Chief C/o:    COVID-19 Concerns (Pt. c/o sore throat and a dry cough x 2 days, diarrhea started this morning. Pt. Took a home COVID-19 Test and it was positive.), Coronary Artery Disease, Chronic Kidney Disease, and Hyperlipidemia        [facility] Maintenance    Health Maintenance       Date Due Completion Date    Aspirin/Antiplatelet Therapy 02/03/2023 2/3/2022    Colonoscopy 07/22/2023 7/22/2020    Override on 2/2/2018: Done    Override on 9/9/2013: Done    TETANUS VACCINE 04/25/2024 4/25/2014    Lipid Panel 11/11/2026 11/11/2021                 HISTORY OF PRESENT ILLNESS:    JUDE Gu is a 81 y.o. male who presents to the clinic today for COVID-19 Concerns (Pt. c/o sore throat and a dry cough x 2 days, diarrhea started this morning. Pt. Took a home COVID-19 Test and it was positive.), Coronary Artery Disease, Chronic Kidney Disease, and Hyperlipidemia  .  Patient  tested positive for COVID-19 today.  He has sore throat cough and congestion for the past 2 days watery diarrhea that started this morning.  He does not feel bad however he feels somewhat weak, no fever no chills.  No chest pain and no shortness of breath                  ALLERGIES AND MEDICATIONS: updated and reviewed.  Review of patient's allergies indicates:   Allergen Reactions    Lipitor [atorvastatin]      Pain/cramping    Pcn [penicillins] Hives    Pravastatin      Pain/cramping    Simvastatin     Statins-hmg-coa reductase inhibitors      Medication List with Changes/Refills   New Medications    NIRMATRELVIR-RITONAVIR 300 MG (150 MG X 2)-100 MG COPACKAGED TABLETS (EUA)    Take 3 tablets by mouth 2 (two) times daily for 5 days. Each dose contains 2 nirmatrelvir (pink tablets) and 1 ritonavir (white tablet). Take all 3 tablets together    PROMETHAZINE-DEXTROMETHORPHAN (PROMETHAZINE-DM) 6.25-15 MG/5 ML SYRP    Take 5 mLs by mouth every 6 (six) hours as needed.   Current Medications    ALCOHOL SWABS (BD ALCOHOL SWABS) PADM    Apply 1 each topically once daily.    ALLOPURINOL (ZYLOPRIM) 100 MG TABLET    Take 1 tablet (100 mg total) by mouth every morning.    AMLODIPINE (NORVASC) 5 MG TABLET    Take 5 mg by mouth every morning.     ASPIRIN (ECOTRIN) 81 MG EC TABLET    Take 1 tablet (81 mg total) by mouth once daily.    CHOLECALCIFEROL, VITAMIN D3, (VITAMIN D3) 50 MCG (2,000 UNIT) TAB    Take 1 tablet (2,000 Units total) by mouth once daily.    DICYCLOMINE (BENTYL) 10 MG CAPSULE    Take 10 mg by mouth 4 (four) times daily before meals and nightly.    ESOMEPRAZOLE (NEXIUM) 20 MG CAPSULE    Take 1 capsule (20 mg total) by mouth before breakfast.    EVOLOCUMAB (REPATHA SURECLICK) 140 MG/ML PNIJ    Inject 1 mL into the skin.    FINASTERIDE (PROSCAR) 5 MG TABLET    Take 1 tablet (5 mg total) by mouth every evening.    HYDROCHLOROTHIAZIDE (HYDRODIURIL) 25 MG TABLET    TAKE 1 TABLET EVERY MORNING    LACTOBAC.  RHAMNOSUS GG-INULIN (CULTURELLE DIGESTIVE HEALTH) 10 BILLION CELL -200 MG CAP    Take by mouth.    TAMSULOSIN (FLOMAX) 0.4 MG CAP    Take 1 capsule (0.4 mg total) by mouth every evening.       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    Hypertensive chronic kidney disease with stage 1 through stage 4 chronic kidney disease, or unspecified chronic kidney disease    Hyperkalemia    Gastro-esophageal reflux disease without esophagitis    Chronic obstructive pulmonary disease, unspecified    Chronic low back pain    Carpal tunnel syndrome, bilateral upper limbs    BMI 28.0-28.9,adult    Coronary artery disease involving native coronary artery without angina pectoris    PVC (premature ventricular contraction)    Statin intolerance    S/P laparoscopic cholecystectomy    Bradycardia    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   HENT: Positive for congestion, postnasal drip and rhinorrhea. Negative for drooling, ear discharge, ear pain, facial swelling, hearing loss, nosebleeds, sinus pain, sneezing, sore throat, tinnitus, trouble  swallowing and voice change.    Eyes: Negative for pain, discharge, redness, itching and visual disturbance.   Respiratory: Negative for cough, choking, chest tightness, shortness of breath, wheezing and stridor.    Cardiovascular: Negative for chest pain, palpitations and leg swelling.   Gastrointestinal: Positive for diarrhea. Negative for abdominal distention, abdominal pain, blood in stool, constipation, nausea and vomiting.   Endocrine: Negative for cold intolerance, heat intolerance, polydipsia, polyphagia and polyuria.   Genitourinary: Negative for difficulty urinating, dysuria, flank pain, frequency, hematuria and urgency.   Musculoskeletal: Positive for arthralgias. Negative for back pain, gait problem, joint swelling, myalgias, neck pain and neck stiffness.   Skin: Negative for color change, pallor, rash and wound.   Allergic/Immunologic: Negative for environmental allergies, food allergies and immunocompromised state.   Neurological: Negative for dizziness, tremors, seizures, syncope, speech difficulty, weakness, light-headedness, numbness and headaches.   Hematological: Negative for adenopathy. Does not bruise/bleed easily.   Psychiatric/Behavioral: Negative for agitation, behavioral problems, confusion, decreased concentration, dysphoric mood, hallucinations, sleep disturbance and suicidal ideas. The patient is not nervous/anxious.          PHYSICAL EXAM:    There were no vitals filed for this visit.          There is no height or weight on file to calculate BMI.  There were no vitals filed for this visit.       Physical Exam  Constitutional:       Comments: This visit was a TELEMEDICINE ENCOUNTER.  Physical examination was not possible to be performed.              Labs:    Lab Results   Component Value Date    GLU 78 11/11/2021     11/11/2021    K 3.9 11/11/2021     11/11/2021    CO2 29 11/11/2021    BUN 24 11/11/2021    CREATININE 1.09 11/11/2021    CALCIUM 9.2 11/11/2021    PROT 6.6  11/11/2021    ALBUMIN 4.1 11/11/2021    BILITOT 0.7 11/11/2021    AST 21 11/11/2021    ALT 41 11/11/2021    ESTGFRAFRICA 72 11/11/2021    EGFRNONAA 62 11/11/2021     Lab Results   Component Value Date    WBC 5.3 11/05/2020    RBC 4.43 11/05/2020    HGB 13.7 11/05/2020    HCT 39.7 11/05/2020    MCV 89.6 11/05/2020    RDW 12.6 11/05/2020     11/05/2020      Lab Results   Component Value Date    CHOL 141 11/11/2021    TRIG 31 11/11/2021    TRIG 40 09/05/2019    HDL 63 11/11/2021    HDL 58 09/05/2019    LDLCALC 69 11/11/2021     No results found for: TSH  Lab Results   Component Value Date    HGBA1C 5.2 11/11/2021    HGBA1C 5.5 09/05/2019      No components found for: MICROALBUMIN/CREATININE    ASSESSMENT & PLAN:    1. COVID-19 virus infection  - nirmatrelvir-ritonavir 300 mg (150 mg x 2)-100 mg copackaged tablets (EUA); Take 3 tablets by mouth 2 (two) times daily for 5 days. Each dose contains 2 nirmatrelvir (pink tablets) and 1 ritonavir (white tablet). Take all 3 tablets together  Dispense: 30 tablet; Refill: 0    2. Acute URI  - promethazine-dextromethorphan (PROMETHAZINE-DM) 6.25-15 mg/5 mL Syrp; Take 5 mLs by mouth every 6 (six) hours as needed.  Dispense: 180 mL; Refill: 0    3. Gastroenteritis  - dicyclomine (BENTYL) 10 MG capsule; Take 10 mg by mouth 4 (four) times daily before meals and nightly.       Patient was advised to keep well hydrated, to observe, to the emergency room for any distress, and to call us for any issue or question.      No orders of the defined types were placed in this encounter.     No follow-ups on file. or sooner as needed.    Patient was counseled and questions and concerns were addressed.    Please note:  Parts of this report were done using a dictation software, voice to text, and sometimes the text contains some uncorrected words or sentences that are missed during revision.

## 2022-02-17 ENCOUNTER — OFFICE VISIT (OUTPATIENT)
Dept: INTERNAL MEDICINE | Facility: CLINIC | Age: 82
End: 2022-02-17
Payer: MEDICARE

## 2022-02-17 VITALS
DIASTOLIC BLOOD PRESSURE: 69 MMHG | BODY MASS INDEX: 29.31 KG/M2 | TEMPERATURE: 98 F | HEART RATE: 62 BPM | WEIGHT: 186.75 LBS | SYSTOLIC BLOOD PRESSURE: 123 MMHG | HEIGHT: 67 IN

## 2022-02-17 DIAGNOSIS — M54.50 CHRONIC LOW BACK PAIN, UNSPECIFIED BACK PAIN LATERALITY, UNSPECIFIED WHETHER SCIATICA PRESENT: ICD-10-CM

## 2022-02-17 DIAGNOSIS — E78.2 MIXED HYPERLIPIDEMIA: ICD-10-CM

## 2022-02-17 DIAGNOSIS — K21.9 GASTRO-ESOPHAGEAL REFLUX DISEASE WITHOUT ESOPHAGITIS: ICD-10-CM

## 2022-02-17 DIAGNOSIS — R73.03 PREDIABETES: ICD-10-CM

## 2022-02-17 DIAGNOSIS — N13.8 BENIGN PROSTATIC HYPERPLASIA WITH URINARY OBSTRUCTION: ICD-10-CM

## 2022-02-17 DIAGNOSIS — I49.3 PVC (PREMATURE VENTRICULAR CONTRACTION): ICD-10-CM

## 2022-02-17 DIAGNOSIS — I73.9 PAD (PERIPHERAL ARTERY DISEASE): ICD-10-CM

## 2022-02-17 DIAGNOSIS — N18.2 CHRONIC KIDNEY DISEASE, STAGE 2 (MILD): ICD-10-CM

## 2022-02-17 DIAGNOSIS — I65.23 OCCLUSION AND STENOSIS OF BILATERAL CAROTID ARTERIES: ICD-10-CM

## 2022-02-17 DIAGNOSIS — M15.9 POLYARTICULAR OSTEOARTHRITIS: ICD-10-CM

## 2022-02-17 DIAGNOSIS — G89.29 CHRONIC LOW BACK PAIN, UNSPECIFIED BACK PAIN LATERALITY, UNSPECIFIED WHETHER SCIATICA PRESENT: ICD-10-CM

## 2022-02-17 DIAGNOSIS — Z74.09 OTHER REDUCED MOBILITY: ICD-10-CM

## 2022-02-17 DIAGNOSIS — Z78.9 STATIN INTOLERANCE: ICD-10-CM

## 2022-02-17 DIAGNOSIS — I25.10 CORONARY ARTERY DISEASE INVOLVING NATIVE CORONARY ARTERY OF NATIVE HEART WITHOUT ANGINA PECTORIS: ICD-10-CM

## 2022-02-17 DIAGNOSIS — N40.1 BENIGN PROSTATIC HYPERPLASIA WITH URINARY OBSTRUCTION: ICD-10-CM

## 2022-02-17 DIAGNOSIS — J44.9 CHRONIC OBSTRUCTIVE PULMONARY DISEASE, UNSPECIFIED COPD TYPE: ICD-10-CM

## 2022-02-17 DIAGNOSIS — I12.9 HYPERTENSIVE CHRONIC KIDNEY DISEASE WITH STAGE 1 THROUGH STAGE 4 CHRONIC KIDNEY DISEASE, OR UNSPECIFIED CHRONIC KIDNEY DISEASE: Primary | ICD-10-CM

## 2022-02-17 DIAGNOSIS — E79.0 HYPERURICEMIA WITHOUT SIGNS OF INFLAMMATORY ARTHRITIS AND TOPHACEOUS DISEASE: ICD-10-CM

## 2022-02-17 DIAGNOSIS — F52.21 ED (ERECTILE DYSFUNCTION) OF NON-ORGANIC ORIGIN: ICD-10-CM

## 2022-02-17 DIAGNOSIS — Z87.442 PERSONAL HISTORY OF URINARY CALCULI: ICD-10-CM

## 2022-02-17 PROBLEM — Z86.16 HISTORY OF COVID-19: Status: ACTIVE | Noted: 2022-02-17

## 2022-02-17 PROCEDURE — 99214 OFFICE O/P EST MOD 30 MIN: CPT | Mod: 25,S$GLB,, | Performed by: INTERNAL MEDICINE

## 2022-02-17 PROCEDURE — 1160F RVW MEDS BY RX/DR IN RCRD: CPT | Mod: CPTII,S$GLB,, | Performed by: INTERNAL MEDICINE

## 2022-02-17 PROCEDURE — 1159F MED LIST DOCD IN RCRD: CPT | Mod: CPTII,S$GLB,, | Performed by: INTERNAL MEDICINE

## 2022-02-17 PROCEDURE — 96160 PT-FOCUSED HLTH RISK ASSMT: CPT | Mod: S$GLB,,, | Performed by: INTERNAL MEDICINE

## 2022-02-17 PROCEDURE — 3078F PR MOST RECENT DIASTOLIC BLOOD PRESSURE < 80 MM HG: ICD-10-PCS | Mod: CPTII,S$GLB,, | Performed by: INTERNAL MEDICINE

## 2022-02-17 PROCEDURE — 1157F ADVNC CARE PLAN IN RCRD: CPT | Mod: CPTII,S$GLB,, | Performed by: INTERNAL MEDICINE

## 2022-02-17 PROCEDURE — 1159F PR MEDICATION LIST DOCUMENTED IN MEDICAL RECORD: ICD-10-PCS | Mod: CPTII,S$GLB,, | Performed by: INTERNAL MEDICINE

## 2022-02-17 PROCEDURE — 96160 PR PT FOCUSED HLTH RISK ASSMT: ICD-10-PCS | Mod: S$GLB,,, | Performed by: INTERNAL MEDICINE

## 2022-02-17 PROCEDURE — 1126F PR PAIN SEVERITY QUANTIFIED, NO PAIN PRESENT: ICD-10-PCS | Mod: CPTII,S$GLB,, | Performed by: INTERNAL MEDICINE

## 2022-02-17 PROCEDURE — 1157F PR ADVANCE CARE PLAN OR EQUIV PRESENT IN MEDICAL RECORD: ICD-10-PCS | Mod: CPTII,S$GLB,, | Performed by: INTERNAL MEDICINE

## 2022-02-17 PROCEDURE — 99214 PR OFFICE/OUTPT VISIT, EST, LEVL IV, 30-39 MIN: ICD-10-PCS | Mod: 25,S$GLB,, | Performed by: INTERNAL MEDICINE

## 2022-02-17 PROCEDURE — 1126F AMNT PAIN NOTED NONE PRSNT: CPT | Mod: CPTII,S$GLB,, | Performed by: INTERNAL MEDICINE

## 2022-02-17 PROCEDURE — 99499 RISK ADDL DX/OHS AUDIT: ICD-10-PCS | Mod: S$GLB,,, | Performed by: INTERNAL MEDICINE

## 2022-02-17 PROCEDURE — 1160F PR REVIEW ALL MEDS BY PRESCRIBER/CLIN PHARMACIST DOCUMENTED: ICD-10-PCS | Mod: CPTII,S$GLB,, | Performed by: INTERNAL MEDICINE

## 2022-02-17 PROCEDURE — 3074F PR MOST RECENT SYSTOLIC BLOOD PRESSURE < 130 MM HG: ICD-10-PCS | Mod: CPTII,S$GLB,, | Performed by: INTERNAL MEDICINE

## 2022-02-17 PROCEDURE — 3074F SYST BP LT 130 MM HG: CPT | Mod: CPTII,S$GLB,, | Performed by: INTERNAL MEDICINE

## 2022-02-17 PROCEDURE — 3078F DIAST BP <80 MM HG: CPT | Mod: CPTII,S$GLB,, | Performed by: INTERNAL MEDICINE

## 2022-02-17 PROCEDURE — 99499 UNLISTED E&M SERVICE: CPT | Mod: S$GLB,,, | Performed by: INTERNAL MEDICINE

## 2022-02-17 NOTE — PROGRESS NOTES
Chief C/o:    Hyperlipidemia, Pre-diabetes, Chronic Kidney Disease, and Hyperkalemia        [facility]    Health Maintenance       Date Due Completion Date    Aspirin/Antiplatelet Therapy 02/17/2023 2/17/2022    Colonoscopy 07/22/2023 7/22/2020    Override on 2/2/2018: Done    Override on 9/9/2013: Done    TETANUS VACCINE 04/25/2024 4/25/2014    Lipid Panel 11/11/2026 11/11/2021                 HISTORY OF PRESENT ILLNESS:    JUDE Gu is a 81 y.o. male who presents to the clinic today for Hyperlipidemia, Pre-diabetes, Chronic Kidney Disease, and Hyperkalemia  . Patient is having no chest pain, no shortness of breath, no fever no chills.  Patient is having no side effects related to his current medications.                  ALLERGIES AND MEDICATIONS: updated and reviewed.  Review of patient's allergies indicates:   Allergen Reactions    Lipitor [atorvastatin]      Pain/cramping    Pcn [penicillins] Hives    Pravastatin      Pain/cramping    Simvastatin     Statins-hmg-coa reductase inhibitors      Medication List with Changes/Refills   Current Medications    ALCOHOL SWABS (BD ALCOHOL SWABS) PADM    Apply 1 each topically once daily.    ALLOPURINOL (ZYLOPRIM) 100 MG TABLET    Take 1 tablet (100 mg total) by mouth every morning.    AMLODIPINE (NORVASC) 5 MG TABLET    Take 5 mg by mouth every morning.     ASPIRIN (ECOTRIN) 81 MG EC TABLET    Take 1 tablet (81 mg total) by mouth once daily.    CHOLECALCIFEROL, VITAMIN D3, (VITAMIN D3) 50 MCG (2,000 UNIT) TAB    Take 1 tablet (2,000 Units total) by mouth once daily.    DICYCLOMINE (BENTYL) 10 MG CAPSULE    Take 10 mg by mouth 4 (four) times daily before meals and nightly.    ESOMEPRAZOLE (NEXIUM) 20 MG CAPSULE    Take 1 capsule (20 mg total) by mouth before breakfast.    EVOLOCUMAB (REPATHA SURECLICK) 140 MG/ML PNIJ    Inject 1 mL into the skin.    FINASTERIDE (PROSCAR) 5 MG TABLET    Take 1 tablet (5 mg total) by mouth every evening.     HYDROCHLOROTHIAZIDE (HYDRODIURIL) 25 MG TABLET    TAKE 1 TABLET EVERY MORNING    LACTOBAC. RHAMNOSUS GG-INULIN (CULTURELLE DIGESTIVE HEALTH) 10 BILLION CELL -200 MG CAP    Take by mouth.    PROMETHAZINE-DEXTROMETHORPHAN (PROMETHAZINE-DM) 6.25-15 MG/5 ML SYRP    Take 5 mLs by mouth every 6 (six) hours as needed.    TAMSULOSIN (FLOMAX) 0.4 MG CAP    Take 1 capsule (0.4 mg total) by mouth every evening.       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    Hypertensive chronic kidney disease with stage 1 through stage 4 chronic kidney disease, or unspecified chronic kidney disease    Hyperkalemia    Gastro-esophageal reflux disease without esophagitis    Chronic obstructive pulmonary disease, unspecified    Chronic low back pain    Carpal tunnel syndrome, bilateral upper limbs    Coronary artery disease involving native coronary artery of native heart without angina pectoris    PVC (premature ventricular contraction)    Statin intolerance    S/P laparoscopic cholecystectomy    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   HENT: Negative for congestion, drooling, ear  "discharge, ear pain, facial swelling, hearing loss, nosebleeds, postnasal drip, rhinorrhea, sinus pain, sneezing, sore throat, tinnitus, trouble swallowing and voice change.    Eyes: Negative for pain, discharge, redness, itching and visual disturbance.   Respiratory: Negative for cough, choking, chest tightness, shortness of breath, wheezing and stridor.    Cardiovascular: Negative for chest pain, palpitations and leg swelling.   Gastrointestinal: Negative for abdominal distention, abdominal pain, blood in stool, constipation, diarrhea, nausea and vomiting.   Endocrine: Negative for cold intolerance, heat intolerance, polydipsia, polyphagia and polyuria.   Genitourinary: Negative for difficulty urinating, dysuria, flank pain, frequency, hematuria and urgency.   Musculoskeletal: Positive for arthralgias. Negative for back pain, gait problem, joint swelling, myalgias, neck pain and neck stiffness.   Skin: Negative for color change, pallor, rash and wound.   Allergic/Immunologic: Negative for environmental allergies, food allergies and immunocompromised state.   Neurological: Negative for dizziness, tremors, seizures, syncope, speech difficulty, weakness, light-headedness, numbness and headaches.   Hematological: Negative for adenopathy. Does not bruise/bleed easily.   Psychiatric/Behavioral: Negative for agitation, behavioral problems, confusion, decreased concentration, dysphoric mood, hallucinations, sleep disturbance and suicidal ideas. The patient is not nervous/anxious.          PHYSICAL EXAM:    Vitals:    02/17/22 0832   BP: 123/69   Pulse: 62   Temp: 97.8 °F (36.6 °C)     Weight: 84.7 kg (186 lb 11.7 oz)   Height: 5' 7.01" (170.2 cm)   Body mass index is 29.24 kg/m².  Vitals:    02/17/22 0832   BP: 123/69   Pulse: 62   Temp: 97.8 °F (36.6 °C)   TempSrc: Temporal   Weight: 84.7 kg (186 lb 11.7 oz)   Height: 5' 7.01" (1.702 m)   PainSc: 0-No pain          Physical Exam  Vitals and nursing note reviewed. "   Constitutional:       General: He is not in acute distress.     Appearance: He is not ill-appearing, toxic-appearing or diaphoretic.      Comments: Patient is overweight.    Patient is alert, awake and oriented X 3.  Patient is comfortable, cooperative and in no apparent distress.     HENT:      Head: Normocephalic and atraumatic.      Right Ear: Tympanic membrane, ear canal and external ear normal. There is no impacted cerumen.      Left Ear: Tympanic membrane, ear canal and external ear normal. There is no impacted cerumen.      Nose: Nose normal. No congestion or rhinorrhea.      Mouth/Throat:      Mouth: Mucous membranes are moist.      Pharynx: Oropharynx is clear. No oropharyngeal exudate or posterior oropharyngeal erythema.   Eyes:      General: No scleral icterus.        Right eye: No discharge.         Left eye: No discharge.      Extraocular Movements: Extraocular movements intact.      Conjunctiva/sclera: Conjunctivae normal.      Pupils: Pupils are equal, round, and reactive to light.   Cardiovascular:      Rate and Rhythm: Regular rhythm. Bradycardia present.      Pulses: Normal pulses.      Heart sounds: Murmur heard.   No friction rub. No gallop.    Pulmonary:      Effort: Pulmonary effort is normal. No respiratory distress.      Breath sounds: Normal breath sounds. No stridor. No wheezing, rhonchi or rales.   Chest:      Chest wall: No tenderness.   Abdominal:      General: Bowel sounds are normal. There is no distension.      Palpations: Abdomen is soft. There is no mass.      Tenderness: There is no abdominal tenderness. There is no guarding or rebound.      Hernia: No hernia is present.   Musculoskeletal:         General: No swelling, tenderness, deformity or signs of injury. Normal range of motion.      Cervical back: Normal range of motion and neck supple. No rigidity.      Right lower leg: No edema.      Left lower leg: No edema.      Comments: Crepitations knees.   Lymphadenopathy:       Cervical: No cervical adenopathy.   Skin:     General: Skin is warm and dry.      Capillary Refill: Capillary refill takes less than 2 seconds.      Coloration: Skin is not jaundiced or pale.      Findings: No bruising, erythema, lesion or rash.   Neurological:      General: No focal deficit present.      Mental Status: He is alert and oriented to person, place, and time.      Cranial Nerves: No cranial nerve deficit.      Sensory: No sensory deficit.      Motor: No weakness.      Coordination: Coordination normal.      Deep Tendon Reflexes: Reflexes normal.   Psychiatric:         Mood and Affect: Mood normal.         Behavior: Behavior normal.         Thought Content: Thought content normal.         Judgment: Judgment normal.            Labs:    Lab Results   Component Value Date    GLU 78 11/11/2021     11/11/2021    K 3.9 11/11/2021     11/11/2021    CO2 29 11/11/2021    BUN 24 11/11/2021    CREATININE 1.09 11/11/2021    CALCIUM 9.2 11/11/2021    PROT 6.6 11/11/2021    ALBUMIN 4.1 11/11/2021    BILITOT 0.7 11/11/2021    AST 21 11/11/2021    ALT 41 11/11/2021    ESTGFRAFRICA 72 11/11/2021    EGFRNONAA 62 11/11/2021     Lab Results   Component Value Date    WBC 5.3 11/05/2020    RBC 4.43 11/05/2020    HGB 13.7 11/05/2020    HCT 39.7 11/05/2020    MCV 89.6 11/05/2020    RDW 12.6 11/05/2020     11/05/2020      Lab Results   Component Value Date    CHOL 141 11/11/2021    TRIG 31 11/11/2021    TRIG 40 09/05/2019    HDL 63 11/11/2021    HDL 58 09/05/2019    LDLCALC 69 11/11/2021     No results found for: TSH  Lab Results   Component Value Date    HGBA1C 5.2 11/11/2021    HGBA1C 5.5 09/05/2019      No components found for: MICROALBUMIN/CREATININE    ASSESSMENT & PLAN:    1. Hypertensive chronic kidney disease with stage 1 through stage 4 chronic kidney disease, or unspecified chronic kidney disease  The current medical regimen is effective;  continue present plan and medications.  2. Chronic kidney  disease, stage 2 (mild)  Stable, will keep monitoring at intervals  3. Mixed hyperlipidemia  The current medical regimen is effective;  continue present plan and medications.  4. Coronary artery disease involving native coronary artery of native heart without angina pectoris  Continue aspirin and Repatha decrease chances of progression  5. PAD (peripheral artery disease)  As above  6. Occlusion and stenosis of bilateral carotid arteries  As above  7. PVC (premature ventricular contraction)  In sinus thing currently  8. Chronic obstructive pulmonary disease, unspecified COPD type  Asymptomatic currently  9. Prediabetes  Blood sugar is in the range of prediabetes.  In simple words, blood sugar is elevated more than the upper limit of normal, and less than that of Diabetes Mellitus.   Healthy diet, exercise and weight management are essential, to prevent or decrease chances of progression to f clinical Diabetes Mellitus.  10. BMI 29.0-29.9,adult  Healthy diet and exercise with a goal BMI below 25.   11. Benign prostatic hyperplasia with urinary obstruction    12. ED (erectile dysfunction) of non-organic origin    13. Personal history of urinary calculi    14. Gastro-esophageal reflux disease without esophagitis    15. Chronic low back pain, unspecified back pain laterality, unspecified whether sciatica present    16. 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 Polyarticular osteoarthritis    18. Other reduced mobility    19. Statin intolerance     Patient doing very well in general, continue current medications, same 3 months time and when needed.        No orders of the defined types were placed in this encounter.     Follow up in about 3 months (around 5/17/2022), or if symptoms worsen or fail to improve. or sooner as needed.    Patient was counseled and questions and concerns were addressed.    Please note:  Parts of this report were done using a dictation software, voice to  text, and sometimes the text contains some uncorrected words or sentences that are missed during revision.

## 2022-05-12 LAB
ALBUMIN SERPL-MCNC: 3.9 G/DL (ref 3.6–5.1)
ALBUMIN/GLOB SERPL: 1.5 (CALC) (ref 1–2.5)
ALP SERPL-CCNC: 126 U/L (ref 35–144)
ALT SERPL-CCNC: 53 U/L (ref 9–46)
AST SERPL-CCNC: 25 U/L (ref 10–35)
BASOPHILS # BLD AUTO: 32 CELLS/UL (ref 0–200)
BASOPHILS NFR BLD AUTO: 0.4 %
BILIRUB SERPL-MCNC: 0.6 MG/DL (ref 0.2–1.2)
BUN SERPL-MCNC: 26 MG/DL (ref 7–25)
BUN/CREAT SERPL: 23 (CALC) (ref 6–22)
CALCIUM SERPL-MCNC: 9.5 MG/DL (ref 8.6–10.3)
CHLORIDE SERPL-SCNC: 104 MMOL/L (ref 98–110)
CHOLEST SERPL-MCNC: 127 MG/DL
CHOLEST/HDLC SERPL: 2.8 (CALC)
CO2 SERPL-SCNC: 29 MMOL/L (ref 20–32)
CREAT SERPL-MCNC: 1.12 MG/DL (ref 0.7–1.11)
EOSINOPHIL # BLD AUTO: 111 CELLS/UL (ref 15–500)
EOSINOPHIL NFR BLD AUTO: 1.4 %
ERYTHROCYTE [DISTWIDTH] IN BLOOD BY AUTOMATED COUNT: 12.6 % (ref 11–15)
GLOBULIN SER CALC-MCNC: 2.6 G/DL (CALC) (ref 1.9–3.7)
GLUCOSE SERPL-MCNC: 93 MG/DL (ref 65–99)
HBA1C MFR BLD: 5.4 % OF TOTAL HGB
HCT VFR BLD AUTO: 40.9 % (ref 38.5–50)
HDLC SERPL-MCNC: 46 MG/DL
HGB BLD-MCNC: 13.3 G/DL (ref 13.2–17.1)
LDLC SERPL CALC-MCNC: 68 MG/DL (CALC)
LYMPHOCYTES # BLD AUTO: 1785 CELLS/UL (ref 850–3900)
LYMPHOCYTES NFR BLD AUTO: 22.6 %
MCH RBC QN AUTO: 30.6 PG (ref 27–33)
MCHC RBC AUTO-ENTMCNC: 32.5 G/DL (ref 32–36)
MCV RBC AUTO: 94.2 FL (ref 80–100)
MONOCYTES # BLD AUTO: 600 CELLS/UL (ref 200–950)
MONOCYTES NFR BLD AUTO: 7.6 %
NEUTROPHILS # BLD AUTO: 5372 CELLS/UL (ref 1500–7800)
NEUTROPHILS NFR BLD AUTO: 68 %
NONHDLC SERPL-MCNC: 81 MG/DL (CALC)
PLATELET # BLD AUTO: 204 THOUSAND/UL (ref 140–400)
PMV BLD REES-ECKER: 8.8 FL (ref 7.5–12.5)
POTASSIUM SERPL-SCNC: 4.6 MMOL/L (ref 3.5–5.3)
PROT SERPL-MCNC: 6.5 G/DL (ref 6.1–8.1)
RBC # BLD AUTO: 4.34 MILLION/UL (ref 4.2–5.8)
SODIUM SERPL-SCNC: 145 MMOL/L (ref 135–146)
TRIGL SERPL-MCNC: 45 MG/DL
TSH SERPL-ACNC: 1.9 MIU/L (ref 0.4–4.5)
WBC # BLD AUTO: 7.9 THOUSAND/UL (ref 3.8–10.8)

## 2022-05-17 ENCOUNTER — OFFICE VISIT (OUTPATIENT)
Dept: INTERNAL MEDICINE | Facility: CLINIC | Age: 82
End: 2022-05-17
Payer: MEDICARE

## 2022-05-17 VITALS
HEIGHT: 67 IN | TEMPERATURE: 98 F | BODY MASS INDEX: 28.53 KG/M2 | SYSTOLIC BLOOD PRESSURE: 118 MMHG | DIASTOLIC BLOOD PRESSURE: 70 MMHG | WEIGHT: 181.75 LBS | HEART RATE: 49 BPM

## 2022-05-17 DIAGNOSIS — K21.9 GASTRO-ESOPHAGEAL REFLUX DISEASE WITHOUT ESOPHAGITIS: ICD-10-CM

## 2022-05-17 DIAGNOSIS — I49.3 PVC (PREMATURE VENTRICULAR CONTRACTION): ICD-10-CM

## 2022-05-17 DIAGNOSIS — E78.2 MIXED HYPERLIPIDEMIA: ICD-10-CM

## 2022-05-17 DIAGNOSIS — M15.9 POLYARTICULAR OSTEOARTHRITIS: ICD-10-CM

## 2022-05-17 DIAGNOSIS — G89.29 CHRONIC LOW BACK PAIN, UNSPECIFIED BACK PAIN LATERALITY, UNSPECIFIED WHETHER SCIATICA PRESENT: ICD-10-CM

## 2022-05-17 DIAGNOSIS — N40.1 BENIGN PROSTATIC HYPERPLASIA WITH URINARY OBSTRUCTION: ICD-10-CM

## 2022-05-17 DIAGNOSIS — I25.10 CORONARY ARTERY DISEASE INVOLVING NATIVE CORONARY ARTERY OF NATIVE HEART WITHOUT ANGINA PECTORIS: ICD-10-CM

## 2022-05-17 DIAGNOSIS — Z87.442 PERSONAL HISTORY OF URINARY CALCULI: ICD-10-CM

## 2022-05-17 DIAGNOSIS — J44.9 CHRONIC OBSTRUCTIVE PULMONARY DISEASE, UNSPECIFIED COPD TYPE: ICD-10-CM

## 2022-05-17 DIAGNOSIS — E87.5 HYPERKALEMIA: ICD-10-CM

## 2022-05-17 DIAGNOSIS — N18.2 CHRONIC KIDNEY DISEASE, STAGE 2 (MILD): ICD-10-CM

## 2022-05-17 DIAGNOSIS — N13.8 BENIGN PROSTATIC HYPERPLASIA WITH URINARY OBSTRUCTION: ICD-10-CM

## 2022-05-17 DIAGNOSIS — I12.9 HYPERTENSIVE CHRONIC KIDNEY DISEASE WITH STAGE 1 THROUGH STAGE 4 CHRONIC KIDNEY DISEASE, OR UNSPECIFIED CHRONIC KIDNEY DISEASE: Primary | ICD-10-CM

## 2022-05-17 DIAGNOSIS — M54.50 CHRONIC LOW BACK PAIN, UNSPECIFIED BACK PAIN LATERALITY, UNSPECIFIED WHETHER SCIATICA PRESENT: ICD-10-CM

## 2022-05-17 DIAGNOSIS — I65.23 OCCLUSION AND STENOSIS OF BILATERAL CAROTID ARTERIES: ICD-10-CM

## 2022-05-17 DIAGNOSIS — F52.21 ED (ERECTILE DYSFUNCTION) OF NON-ORGANIC ORIGIN: ICD-10-CM

## 2022-05-17 DIAGNOSIS — Z74.09 OTHER REDUCED MOBILITY: ICD-10-CM

## 2022-05-17 DIAGNOSIS — Z78.9 STATIN INTOLERANCE: ICD-10-CM

## 2022-05-17 DIAGNOSIS — E79.0 HYPERURICEMIA WITHOUT SIGNS OF INFLAMMATORY ARTHRITIS AND TOPHACEOUS DISEASE: ICD-10-CM

## 2022-05-17 DIAGNOSIS — I73.9 PAD (PERIPHERAL ARTERY DISEASE): ICD-10-CM

## 2022-05-17 DIAGNOSIS — R73.03 PREDIABETES: ICD-10-CM

## 2022-05-17 PROCEDURE — 3078F DIAST BP <80 MM HG: CPT | Mod: CPTII,S$GLB,, | Performed by: INTERNAL MEDICINE

## 2022-05-17 PROCEDURE — 1159F PR MEDICATION LIST DOCUMENTED IN MEDICAL RECORD: ICD-10-PCS | Mod: CPTII,S$GLB,, | Performed by: INTERNAL MEDICINE

## 2022-05-17 PROCEDURE — 1157F PR ADVANCE CARE PLAN OR EQUIV PRESENT IN MEDICAL RECORD: ICD-10-PCS | Mod: CPTII,S$GLB,, | Performed by: INTERNAL MEDICINE

## 2022-05-17 PROCEDURE — 1101F PR PT FALLS ASSESS DOC 0-1 FALLS W/OUT INJ PAST YR: ICD-10-PCS | Mod: CPTII,S$GLB,, | Performed by: INTERNAL MEDICINE

## 2022-05-17 PROCEDURE — 3074F SYST BP LT 130 MM HG: CPT | Mod: CPTII,S$GLB,, | Performed by: INTERNAL MEDICINE

## 2022-05-17 PROCEDURE — 1160F PR REVIEW ALL MEDS BY PRESCRIBER/CLIN PHARMACIST DOCUMENTED: ICD-10-PCS | Mod: CPTII,S$GLB,, | Performed by: INTERNAL MEDICINE

## 2022-05-17 PROCEDURE — 99214 OFFICE O/P EST MOD 30 MIN: CPT | Mod: S$GLB,,, | Performed by: INTERNAL MEDICINE

## 2022-05-17 PROCEDURE — 1160F RVW MEDS BY RX/DR IN RCRD: CPT | Mod: CPTII,S$GLB,, | Performed by: INTERNAL MEDICINE

## 2022-05-17 PROCEDURE — 3288F FALL RISK ASSESSMENT DOCD: CPT | Mod: CPTII,S$GLB,, | Performed by: INTERNAL MEDICINE

## 2022-05-17 PROCEDURE — 99214 PR OFFICE/OUTPT VISIT, EST, LEVL IV, 30-39 MIN: ICD-10-PCS | Mod: S$GLB,,, | Performed by: INTERNAL MEDICINE

## 2022-05-17 PROCEDURE — 1101F PT FALLS ASSESS-DOCD LE1/YR: CPT | Mod: CPTII,S$GLB,, | Performed by: INTERNAL MEDICINE

## 2022-05-17 PROCEDURE — 99499 UNLISTED E&M SERVICE: CPT | Mod: S$GLB,,, | Performed by: INTERNAL MEDICINE

## 2022-05-17 PROCEDURE — 1126F PR PAIN SEVERITY QUANTIFIED, NO PAIN PRESENT: ICD-10-PCS | Mod: CPTII,S$GLB,, | Performed by: INTERNAL MEDICINE

## 2022-05-17 PROCEDURE — 3074F PR MOST RECENT SYSTOLIC BLOOD PRESSURE < 130 MM HG: ICD-10-PCS | Mod: CPTII,S$GLB,, | Performed by: INTERNAL MEDICINE

## 2022-05-17 PROCEDURE — 99499 RISK ADDL DX/OHS AUDIT: ICD-10-PCS | Mod: S$GLB,,, | Performed by: INTERNAL MEDICINE

## 2022-05-17 PROCEDURE — 1126F AMNT PAIN NOTED NONE PRSNT: CPT | Mod: CPTII,S$GLB,, | Performed by: INTERNAL MEDICINE

## 2022-05-17 PROCEDURE — 1157F ADVNC CARE PLAN IN RCRD: CPT | Mod: CPTII,S$GLB,, | Performed by: INTERNAL MEDICINE

## 2022-05-17 PROCEDURE — 1159F MED LIST DOCD IN RCRD: CPT | Mod: CPTII,S$GLB,, | Performed by: INTERNAL MEDICINE

## 2022-05-17 PROCEDURE — 3078F PR MOST RECENT DIASTOLIC BLOOD PRESSURE < 80 MM HG: ICD-10-PCS | Mod: CPTII,S$GLB,, | Performed by: INTERNAL MEDICINE

## 2022-05-17 PROCEDURE — 3288F PR FALLS RISK ASSESSMENT DOCUMENTED: ICD-10-PCS | Mod: CPTII,S$GLB,, | Performed by: INTERNAL MEDICINE

## 2022-05-17 NOTE — PROGRESS NOTES
Chief C/o:    Chronic Kidney Disease, Coronary Artery Disease, Hyperlipidemia, and Pre-diabetes        Health Care Maintenance    Health Maintenance       Date Due Completion Date    COVID-19 Vaccine (4 - Booster for Pfizer series) 01/30/2022 9/30/2021    Aspirin/Antiplatelet Therapy 05/17/2023 5/17/2022    Colonoscopy 07/22/2023 7/22/2020    Override on 2/2/2018: Done    Override on 9/9/2013: Done    TETANUS VACCINE 04/25/2024 4/25/2014    Lipid Panel 05/11/2027 5/11/2022                 HISTORY OF PRESENT ILLNESS:    JUDE Gu is a 81 y.o. male who presents to the clinic today for Chronic Kidney Disease, Coronary Artery Disease, Hyperlipidemia, and Pre-diabetes  . Patient is having no chest pain, no shortness of breath, no fever no chills.  Patient is having no side effects related to current medications.                  ALLERGIES AND MEDICATIONS: updated and reviewed.  Review of patient's allergies indicates:   Allergen Reactions    Lipitor [atorvastatin]      Pain/cramping    Pcn [penicillins] Hives    Pravastatin      Pain/cramping    Simvastatin     Statins-hmg-coa reductase inhibitors      Medication List with Changes/Refills   Current Medications    ALCOHOL SWABS (BD ALCOHOL SWABS) PADM    Apply 1 each topically once daily.    ALLOPURINOL (ZYLOPRIM) 100 MG TABLET    Take 1 tablet (100 mg total) by mouth every morning.    AMLODIPINE (NORVASC) 5 MG TABLET    Take 5 mg by mouth every morning.     ASPIRIN (ECOTRIN) 81 MG EC TABLET    Take 1 tablet (81 mg total) by mouth once daily.    CHOLECALCIFEROL, VITAMIN D3, (VITAMIN D3) 50 MCG (2,000 UNIT) TAB    Take 1 tablet (2,000 Units total) by mouth once daily.    ESOMEPRAZOLE (NEXIUM) 20 MG CAPSULE    Take 1 capsule (20 mg total) by mouth before breakfast.    EVOLOCUMAB (REPATHA SURECLICK) 140 MG/ML PNIJ    Inject 1 mL into the skin.    FINASTERIDE (PROSCAR) 5 MG TABLET    Take 1 tablet (5 mg total) by mouth every evening.    HYDROCHLOROTHIAZIDE  (HYDRODIURIL) 25 MG TABLET    TAKE 1 TABLET EVERY MORNING    TAMSULOSIN (FLOMAX) 0.4 MG CAP    TAKE 1 CAPSULE ONE TIME DAILY IN THE EVENING   Discontinued Medications    DICYCLOMINE (BENTYL) 10 MG CAPSULE    Take 10 mg by mouth 4 (four) times daily before meals and nightly.    LACTOBAC. RHAMNOSUS GG-INULIN (CULTURELLE DIGESTIVE HEALTH) 10 BILLION CELL -200 MG CAP    Take by mouth.       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    Hypertensive chronic kidney disease with stage 1 through stage 4 chronic kidney disease, or unspecified chronic kidney disease    Hyperkalemia    Gastro-esophageal reflux disease without esophagitis    Chronic obstructive pulmonary disease, unspecified    Chronic low back pain    Carpal tunnel syndrome, bilateral upper limbs    Coronary artery disease involving native coronary artery of native heart without angina pectoris    PVC (premature ventricular contraction)    Statin intolerance    S/P laparoscopic cholecystectomy    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    ED (erectile dysfunction) of non-organic origin    BMI 28.0-28.9,adult    History of COVID-19         CARE TEAM:    Patient Care Team:  Paresh Escobedo MD as PCP - General (Internal Medicine)  Belinda Rinaldi LPN as Licensed Practical Nurse  Edy Varela MD (Cardiology)  Robert Mendieta MD (Pain Medicine)  Dami Noonan II, MD as Consulting Physician (Gastroenterology)  SIMONE Martinez MD as Consulting Physician (Urology)  Duane Christy MD as  "Consulting Physician (Ophthalmology)         REVIEW OF SYSTEMS:    Review of Systems   Constitutional: Negative for appetite change, chills, diaphoresis, fatigue, fever and unexpected weight change.   HENT: Negative for congestion, drooling, ear discharge, ear pain, facial swelling, hearing loss, nosebleeds, postnasal drip, rhinorrhea, sinus pain, sneezing, sore throat, tinnitus, trouble swallowing and voice change.    Eyes: Negative for pain, discharge, redness, itching and visual disturbance.   Respiratory: Negative for cough, choking, chest tightness, shortness of breath, wheezing and stridor.    Cardiovascular: Negative for chest pain, palpitations and leg swelling.   Gastrointestinal: Negative for abdominal distention, abdominal pain, blood in stool, constipation, diarrhea, nausea and vomiting.   Endocrine: Negative for cold intolerance, heat intolerance, polydipsia, polyphagia and polyuria.   Genitourinary: Negative for difficulty urinating, dysuria, flank pain, frequency, hematuria and urgency.   Musculoskeletal: Positive for arthralgias. Negative for back pain, gait problem, joint swelling, myalgias, neck pain and neck stiffness.   Skin: Negative for color change, pallor, rash and wound.   Allergic/Immunologic: Negative for environmental allergies, food allergies and immunocompromised state.   Neurological: Negative for dizziness, tremors, seizures, syncope, speech difficulty, weakness, light-headedness, numbness and headaches.   Hematological: Negative for adenopathy. Does not bruise/bleed easily.   Psychiatric/Behavioral: Negative for agitation, behavioral problems, confusion, decreased concentration, dysphoric mood, hallucinations, sleep disturbance and suicidal ideas. The patient is not nervous/anxious.          PHYSICAL EXAM:    Vitals:    05/17/22 0830   BP: 118/70   Pulse: (!) 49   Temp: 97.8 °F (36.6 °C)     Weight: 82.5 kg (181 lb 12.3 oz)   Height: 5' 7.01" (170.2 cm)   Body mass index is 28.46 " "kg/m².  Vitals:    05/17/22 0830   BP: 118/70   Pulse: (!) 49   Temp: 97.8 °F (36.6 °C)   TempSrc: Temporal   Weight: 82.5 kg (181 lb 12.3 oz)   Height: 5' 7.01" (1.702 m)   PainSc: 0-No pain          Physical Exam  Vitals and nursing note reviewed.   Constitutional:       General: He is not in acute distress.     Appearance: He is not ill-appearing, toxic-appearing or diaphoretic.      Comments: Patient is overweight.    Patient is alert, awake and oriented X 3.  Patient is comfortable, cooperative and in no apparent distress.     HENT:      Head: Normocephalic and atraumatic.      Right Ear: Tympanic membrane, ear canal and external ear normal. There is no impacted cerumen.      Left Ear: Tympanic membrane, ear canal and external ear normal. There is no impacted cerumen.      Nose: Nose normal. No congestion or rhinorrhea.      Mouth/Throat:      Mouth: Mucous membranes are moist.      Pharynx: Oropharynx is clear. No oropharyngeal exudate or posterior oropharyngeal erythema.   Eyes:      General: No scleral icterus.        Right eye: No discharge.         Left eye: No discharge.      Extraocular Movements: Extraocular movements intact.      Conjunctiva/sclera: Conjunctivae normal.      Pupils: Pupils are equal, round, and reactive to light.   Cardiovascular:      Rate and Rhythm: Regular rhythm. Bradycardia present.      Pulses: Normal pulses.      Heart sounds: Murmur heard.     No friction rub. No gallop.   Pulmonary:      Effort: Pulmonary effort is normal. No respiratory distress.      Breath sounds: Normal breath sounds. No stridor. No wheezing, rhonchi or rales.   Chest:      Chest wall: No tenderness.   Abdominal:      General: Bowel sounds are normal. There is no distension.      Palpations: Abdomen is soft. There is no mass.      Tenderness: There is no abdominal tenderness. There is no guarding or rebound.      Hernia: No hernia is present.   Musculoskeletal:         General: No swelling, tenderness, " deformity or signs of injury. Normal range of motion.      Cervical back: Normal range of motion and neck supple. No rigidity.      Right lower leg: No edema.      Left lower leg: No edema.      Comments: Crepitations knees.   Lymphadenopathy:      Cervical: No cervical adenopathy.   Skin:     General: Skin is warm and dry.      Capillary Refill: Capillary refill takes less than 2 seconds.      Coloration: Skin is not jaundiced or pale.      Findings: No bruising, erythema, lesion or rash.   Neurological:      General: No focal deficit present.      Mental Status: He is alert and oriented to person, place, and time.      Cranial Nerves: No cranial nerve deficit.      Sensory: No sensory deficit.      Motor: No weakness.      Coordination: Coordination normal.      Deep Tendon Reflexes: Reflexes normal.   Psychiatric:         Mood and Affect: Mood normal.         Behavior: Behavior normal.         Thought Content: Thought content normal.         Judgment: Judgment normal.            Labs:  Discussed with patient.    Lab Results   Component Value Date    GLU 93 05/11/2022     05/11/2022    K 4.6 05/11/2022     05/11/2022    CO2 29 05/11/2022    BUN 26 (H) 05/11/2022    CREATININE 1.12 (H) 05/11/2022    CALCIUM 9.5 05/11/2022    PROT 6.5 05/11/2022    ALBUMIN 3.9 05/11/2022    BILITOT 0.6 05/11/2022    AST 25 05/11/2022    ALT 53 (H) 05/11/2022    ESTGFRAFRICA 70 05/11/2022    EGFRNONAA 60 05/11/2022     Lab Results   Component Value Date    WBC 7.9 05/11/2022    RBC 4.34 05/11/2022    HGB 13.3 05/11/2022    HCT 40.9 05/11/2022    MCV 94.2 05/11/2022    RDW 12.6 05/11/2022     05/11/2022      Lab Results   Component Value Date    CHOL 127 05/11/2022    TRIG 45 05/11/2022    TRIG 40 09/05/2019    HDL 46 05/11/2022    HDL 58 09/05/2019    LDLCALC 68 05/11/2022     Lab Results   Component Value Date    TSH 1.90 05/11/2022     Lab Results   Component Value Date    HGBA1C 5.4 05/11/2022    HGBA1C 5.5  09/05/2019      No components found for: MICROALBUMIN/CREATININE    ASSESSMENT & PLAN:    1. Encounter for general adult medical examination with abnormal findings    2. Hypertensive chronic kidney disease with stage 1 through stage 4 chronic kidney disease, or unspecified chronic kidney disease  The current medical regimen is effective;  continue present plan and medications.  3. Chronic kidney disease, stage 2 (mild)  Mild and stable, continuing monitoring  4. Mixed hyperlipidemia  The current medical regimen is effective;  continue present plan and medications.  5. Coronary artery disease involving native coronary artery of native heart without angina pectoris  Continue antiplatelets-anticoagulation and statin to decrease chances of progression, in addition to diet, exercise, and weight management.  6. Occlusion and stenosis of bilateral carotid arteries  Continue antiplatelets-anticoagulation and statin to decrease chances of progression, in addition to diet, exercise, and weight management.  7. PAD (peripheral artery disease)  As above  8. PVC (premature ventricular contraction)  In sinus rhythm currently  9. Chronic obstructive pulmonary disease, unspecified COPD type  Asymptomatic  10. Prediabetes  Blood sugar is in the range of prediabetes.  In simple words, blood sugar is elevated more than the upper limit of normal, and less than that of Diabetes Mellitus.   Healthy diet, exercise and weight management are essential, to prevent or decrease chances of progression to f clinical Diabetes Mellitus.  11. Gastro-esophageal reflux disease without esophagitis  The current medical regimen is effective;  continue present plan and medications.  12. BMI 28.0-28.9,adult  Healthy diet, exercise, and weight monitoring are essential for weight management.    Weight loss was discussed.  Ultimate goal is BMI below 25.   13. Benign prostatic hyperplasia with urinary obstruction    14. ED (erectile dysfunction) of non-organic  origin    15. Hyperkalemia    16. Personal history of urinary calculi    17. Chronic low back pain, unspecified back pain laterality, unspecified whether sciatica present    18. 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 Polyarticular osteoarthritis    20. Other reduced mobility    21. Statin intolerance             No orders of the defined types were placed in this encounter.     No follow-ups on file. or sooner as needed.    Patient was counseled and questions and concerns were addressed.    Please note:  Parts of this report were done using a dictation software, voice to text, and sometimes the text contains some uncorrected words or sentences that are missed during revision.

## 2022-07-11 ENCOUNTER — OFFICE VISIT (OUTPATIENT)
Dept: UROLOGY | Facility: CLINIC | Age: 82
End: 2022-07-11
Payer: MEDICARE

## 2022-07-11 VITALS — BODY MASS INDEX: 28.69 KG/M2 | WEIGHT: 183.19 LBS

## 2022-07-11 DIAGNOSIS — R35.1 NOCTURIA MORE THAN TWICE PER NIGHT: ICD-10-CM

## 2022-07-11 DIAGNOSIS — R33.9 INCOMPLETE EMPTYING OF BLADDER: ICD-10-CM

## 2022-07-11 DIAGNOSIS — Z87.442 HISTORY OF KIDNEY STONES: ICD-10-CM

## 2022-07-11 DIAGNOSIS — N13.8 BPH WITH OBSTRUCTION/LOWER URINARY TRACT SYMPTOMS: Primary | ICD-10-CM

## 2022-07-11 DIAGNOSIS — N40.1 BPH WITH OBSTRUCTION/LOWER URINARY TRACT SYMPTOMS: Primary | ICD-10-CM

## 2022-07-11 LAB
BILIRUB SERPL-MCNC: NORMAL MG/DL
BLOOD URINE, POC: NORMAL
COLOR, POC UA: YELLOW
GLUCOSE UR QL STRIP: NORMAL
KETONES UR QL STRIP: NORMAL
LEUKOCYTE ESTERASE URINE, POC: NORMAL
NITRITE, POC UA: NORMAL
PH, POC UA: 5
PROTEIN, POC: NORMAL
SPECIFIC GRAVITY, POC UA: 1015
UROBILINOGEN, POC UA: NORMAL

## 2022-07-11 PROCEDURE — 3288F PR FALLS RISK ASSESSMENT DOCUMENTED: ICD-10-PCS | Mod: CPTII,S$GLB,, | Performed by: UROLOGY

## 2022-07-11 PROCEDURE — 1159F PR MEDICATION LIST DOCUMENTED IN MEDICAL RECORD: ICD-10-PCS | Mod: CPTII,S$GLB,, | Performed by: UROLOGY

## 2022-07-11 PROCEDURE — 1157F PR ADVANCE CARE PLAN OR EQUIV PRESENT IN MEDICAL RECORD: ICD-10-PCS | Mod: CPTII,S$GLB,, | Performed by: UROLOGY

## 2022-07-11 PROCEDURE — 1101F PT FALLS ASSESS-DOCD LE1/YR: CPT | Mod: CPTII,S$GLB,, | Performed by: UROLOGY

## 2022-07-11 PROCEDURE — 1160F PR REVIEW ALL MEDS BY PRESCRIBER/CLIN PHARMACIST DOCUMENTED: ICD-10-PCS | Mod: CPTII,S$GLB,, | Performed by: UROLOGY

## 2022-07-11 PROCEDURE — 3288F FALL RISK ASSESSMENT DOCD: CPT | Mod: CPTII,S$GLB,, | Performed by: UROLOGY

## 2022-07-11 PROCEDURE — 1126F AMNT PAIN NOTED NONE PRSNT: CPT | Mod: CPTII,S$GLB,, | Performed by: UROLOGY

## 2022-07-11 PROCEDURE — 99999 PR PBB SHADOW E&M-EST. PATIENT-LVL III: CPT | Mod: PBBFAC,,, | Performed by: UROLOGY

## 2022-07-11 PROCEDURE — 1160F RVW MEDS BY RX/DR IN RCRD: CPT | Mod: CPTII,S$GLB,, | Performed by: UROLOGY

## 2022-07-11 PROCEDURE — 81001 POCT URINALYSIS, DIPSTICK OR TABLET REAGENT, AUTOMATED, WITH MICROSCOP: ICD-10-PCS | Mod: S$GLB,,, | Performed by: UROLOGY

## 2022-07-11 PROCEDURE — 81001 URINALYSIS AUTO W/SCOPE: CPT | Mod: S$GLB,,, | Performed by: UROLOGY

## 2022-07-11 PROCEDURE — 1101F PR PT FALLS ASSESS DOC 0-1 FALLS W/OUT INJ PAST YR: ICD-10-PCS | Mod: CPTII,S$GLB,, | Performed by: UROLOGY

## 2022-07-11 PROCEDURE — 1157F ADVNC CARE PLAN IN RCRD: CPT | Mod: CPTII,S$GLB,, | Performed by: UROLOGY

## 2022-07-11 PROCEDURE — 1126F PR PAIN SEVERITY QUANTIFIED, NO PAIN PRESENT: ICD-10-PCS | Mod: CPTII,S$GLB,, | Performed by: UROLOGY

## 2022-07-11 PROCEDURE — 99214 PR OFFICE/OUTPT VISIT, EST, LEVL IV, 30-39 MIN: ICD-10-PCS | Mod: S$GLB,,, | Performed by: UROLOGY

## 2022-07-11 PROCEDURE — 1159F MED LIST DOCD IN RCRD: CPT | Mod: CPTII,S$GLB,, | Performed by: UROLOGY

## 2022-07-11 PROCEDURE — 99214 OFFICE O/P EST MOD 30 MIN: CPT | Mod: S$GLB,,, | Performed by: UROLOGY

## 2022-07-11 PROCEDURE — 99999 PR PBB SHADOW E&M-EST. PATIENT-LVL III: ICD-10-PCS | Mod: PBBFAC,,, | Performed by: UROLOGY

## 2022-07-11 NOTE — PROGRESS NOTES
Subjective:       Patient ID: Geronimo Gu is a 81 y.o. male The patient's last visit with me was on 7/2/2021.   Chief Complaint:   Chief Complaint   Patient presents with    Follow-up           History of Present Illness  Benign Prostatic Hypertrophy  Patient complains of lower urinary tract symptoms. He reports nocturia one time a night. He denies straining, urgency and weak stream. Patient states symptoms are of mild severity. Onset of symptoms was several years ago and was gradual in onset. He has no personal history and no family history of prostate cancer. He reports a history of no complicating symptoms. He denies flank pain, gross hematuria, kidney stones and recurrent UTI.  He is currently taking Flomax and Proscar.    IPSS Questionnaire (AUA-7):  Over the past month    1)  How often have you had a sensation of not emptying your bladder completely after you finish urinating?  3 - About half the time   2)  How often have you had to urinate again less than two hours after you finished urinating? 1 - Less than 1 time in 5   3)  How often have you found you stopped and started again several times when you urinated?  3 - About half the time   4) How difficult have you found it to postpone urination?  1 - Less than 1 time in 5   5) How often have you had a weak urinary stream?  2 - Less than half the time   6) How often have you had to push or strain to begin urination?  0 - Not at all   7) How many times did you most typically get up to urinate from the time you went to bed until the time you got up in the morning?  1 - 1 time   Total score:  0-7 mildly symptomatic    8-19 moderately symptomatic   11/1    20-35 severely symptomatic        Kidney Stones  He had a left proximal ureteral stone that was removed in August 2017.  His stent was removed in September.  He is back with an ultrasound.  He denies flank pain or hematuria or fever.        ACTIVE MEDICAL ISSUES:  Patient Active Problem List   Diagnosis     Mixed hyperlipidemia    Prediabetes    Chronic kidney disease, stage 2 (mild)    Benign prostatic hyperplasia with urinary obstruction    Polyarticular osteoarthritis    Personal history of urinary calculi    PAD (peripheral artery disease)    Other reduced mobility    Occlusion and stenosis of bilateral carotid arteries    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    Hypertensive chronic kidney disease with stage 1 through stage 4 chronic kidney disease, or unspecified chronic kidney disease    Hyperkalemia    Gastro-esophageal reflux disease without esophagitis    Chronic obstructive pulmonary disease, unspecified    Chronic low back pain    Carpal tunnel syndrome, bilateral upper limbs    Coronary artery disease involving native coronary artery of native heart without angina pectoris    PVC (premature ventricular contraction)    Statin intolerance    S/P laparoscopic cholecystectomy    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  Review of patient's allergies indicates:   Allergen Reactions    Lipitor [atorvastatin]      Pain/cramping    Pcn [penicillins] Hives    Pravastatin      Pain/cramping    Simvastatin     Statins-hmg-coa reductase inhibitors      Current Outpatient Medications   Medication Sig    alcohol swabs (BD ALCOHOL SWABS) PadM Apply 1 each topically once daily.    allopurinoL (ZYLOPRIM) 100 MG tablet Take 1 tablet (100 mg total) by mouth every morning.    amLODIPine (NORVASC) 5 MG tablet Take 5 mg by mouth every morning.     aspirin (ECOTRIN) 81 MG EC tablet Take 1 tablet (81 mg total) by mouth once daily. (Patient taking differently: Take 81 mg by mouth every evening.)    cholecalciferol, vitamin D3, (VITAMIN D3) 50 mcg (2,000 unit) Tab Take 1 tablet (2,000 Units total)  by mouth once daily.    esomeprazole (NEXIUM) 20 MG capsule Take 1 capsule (20 mg total) by mouth before breakfast.    evolocumab (REPATHA SURECLICK) 140 mg/mL PnIj Inject 1 mL into the skin.    finasteride (PROSCAR) 5 mg tablet Take 1 tablet (5 mg total) by mouth every evening.    hydroCHLOROthiazide (HYDRODIURIL) 25 MG tablet TAKE 1 TABLET EVERY MORNING    tamsulosin (FLOMAX) 0.4 mg Cap TAKE 1 CAPSULE ONE TIME DAILY IN THE EVENING     No current facility-administered medications for this visit.       Review of Systems   Constitutional: Negative for activity change, fatigue, fever and unexpected weight change.   HENT: Negative for congestion.    Eyes: Negative for redness.   Respiratory: Negative for chest tightness and shortness of breath.    Cardiovascular: Negative for chest pain and leg swelling.   Gastrointestinal: Negative for abdominal pain, constipation, diarrhea, nausea and vomiting.   Genitourinary: Negative for dysuria, flank pain, frequency, hematuria, penile pain, penile swelling, scrotal swelling, testicular pain and urgency.   Musculoskeletal: Negative for arthralgias and back pain.   Neurological: Negative for dizziness and light-headedness.   Psychiatric/Behavioral: Negative for behavioral problems and confusion. The patient is not nervous/anxious.    All other systems reviewed and are negative.      Objective:      Vitals:    07/11/22 1544   Weight: 83.1 kg (183 lb 3.2 oz)     Physical Exam  Vitals and nursing note reviewed.   Constitutional:       Appearance: He is well-developed.   HENT:      Head: Normocephalic.   Eyes:      Conjunctiva/sclera: Conjunctivae normal.   Neck:      Thyroid: No thyromegaly.      Trachea: No tracheal deviation.   Cardiovascular:      Rate and Rhythm: Normal rate.      Heart sounds: Normal heart sounds.   Pulmonary:      Effort: Pulmonary effort is normal. No respiratory distress.      Breath sounds: Normal breath sounds. No wheezing.   Abdominal:      General:  There is no distension.      Palpations: Abdomen is soft. There is no mass.      Tenderness: There is no abdominal tenderness. There is no guarding or rebound.      Hernia: No hernia is present. There is no hernia in the right inguinal area or left inguinal area.   Genitourinary:     Penis: Normal.       Testes: Normal.         Right: Mass or tenderness not present.         Left: Mass or tenderness not present.      Prostate: Enlarged. Not tender.      Rectum: Normal. No mass, tenderness or external hemorrhoid.      Comments: 40 gm smooth  Musculoskeletal:         General: No tenderness.      Cervical back: Normal range of motion.   Lymphadenopathy:      Lower Body: No right inguinal adenopathy. No left inguinal adenopathy.   Skin:     General: Skin is warm and dry.      Findings: No erythema or rash.   Neurological:      Mental Status: He is alert and oriented to person, place, and time.   Psychiatric:         Behavior: Behavior normal.         Thought Content: Thought content normal.         Judgment: Judgment normal.         Urine dipstick shows negative for all components.  Micro exam: negative for WBC's or RBC's.      7/5/2022  PSA 0.54 (1.08)    US Retroperitoneal Complete (Kidney and  Order: [id number]   Status: Final result     Visible to patient: No (inaccessible in Patient Portal)     Next appt: 08/18/2022 at 08:30 AM in Internal Medicine (Paresh Escobedo MD)     Dx: History of kidney stones     0 Result Notes    Details    Reading Physician Reading Date Result Priority   Mehran Yin MD  [phone]  [phone] 7/22/2021 Routine     Narrative & Impression  EXAMINATION:  US RETROPERITONEAL COMPLETE     CLINICAL HISTORY:  Personal history of urinary calculi     TECHNIQUE:  Ultrasound of the kidneys and urinary bladder was performed including color flow and Doppler evaluation of the kidneys.     COMPARISON:  None     FINDINGS:  Right kidney     Right kidney measures 4.8 x 5.2 x 11.4-cm and is normal  in location, echogenicity and morphology.  No cortical thinning. No loss of corticomedullary distinction. No suspicious sizable vascular mass. No obstructing nephrolith or hydronephrosis.  Resistive index measures 0.7.     Left kidney     Left kidney measures 4.5 x 6.4 x 12.7-cm and is normal in location, echogenicity and morphology.  No cortical thinning. No loss of corticomedullary distinction. No suspicious sizable vascular mass. No obstructing nephrolith.  Left-sided pelvocaliectasis without overt hydronephrosis.  Resistive index measures 0.7.     Urinary bladder     The bladder is well distended at the time of scanning and is grossly unremarkable in appearance without appreciable mural thickening or focal nodularity.  Bilateral ureteral jets are noted during the exam.  No significant postvoid residual.     Impression:     1. No appreciable renal calculi bilaterally.  2. Left-sided pelvocaliectasis without overt hydronephrosis.  Bilateral ureteral jets are noted within the urinary bladder during the exam.        Electronically signed by: Mehran Yin  Date:                                            07/22/2021  Time:                                           10:43         Assessment:       1. BPH with obstruction/lower urinary tract symptoms    2. Nocturia more than twice per night    3. Incomplete emptying of bladder    4. History of kidney stones          Plan:       1. BPH with obstruction/lower urinary tract symptoms  Flomax and Proscar  OJLEEN and PSA in a year  - Prostate Specific Antigen, Diagnostic; Future    2. Nocturia more than twice per night  Limit evening fluids  - POCT urinalysis, dipstick or tablet reag    3. Incomplete emptying of bladder  stable  - US Retroperitoneal Complete (Kidney and; Future    4. History of kidney stones  stable              Follow up in about 1 year (around 7/11/2023) for Review PSA, Follow up.

## 2022-08-18 ENCOUNTER — OFFICE VISIT (OUTPATIENT)
Dept: INTERNAL MEDICINE | Facility: CLINIC | Age: 82
End: 2022-08-18
Payer: MEDICARE

## 2022-08-18 VITALS
DIASTOLIC BLOOD PRESSURE: 61 MMHG | SYSTOLIC BLOOD PRESSURE: 105 MMHG | WEIGHT: 182.63 LBS | TEMPERATURE: 97 F | BODY MASS INDEX: 28.66 KG/M2 | HEIGHT: 67 IN | HEART RATE: 50 BPM

## 2022-08-18 DIAGNOSIS — N40.1 BENIGN PROSTATIC HYPERPLASIA WITH URINARY OBSTRUCTION: ICD-10-CM

## 2022-08-18 DIAGNOSIS — Z74.09 OTHER REDUCED MOBILITY: ICD-10-CM

## 2022-08-18 DIAGNOSIS — I73.9 PAD (PERIPHERAL ARTERY DISEASE): ICD-10-CM

## 2022-08-18 DIAGNOSIS — I65.23 OCCLUSION AND STENOSIS OF BILATERAL CAROTID ARTERIES: ICD-10-CM

## 2022-08-18 DIAGNOSIS — J44.9 CHRONIC OBSTRUCTIVE PULMONARY DISEASE, UNSPECIFIED COPD TYPE: ICD-10-CM

## 2022-08-18 DIAGNOSIS — Z00.01 ENCOUNTER FOR GENERAL ADULT MEDICAL EXAMINATION WITH ABNORMAL FINDINGS: Primary | ICD-10-CM

## 2022-08-18 DIAGNOSIS — E79.0 HYPERURICEMIA WITHOUT SIGNS OF INFLAMMATORY ARTHRITIS AND TOPHACEOUS DISEASE: ICD-10-CM

## 2022-08-18 DIAGNOSIS — I25.10 CORONARY ARTERY DISEASE INVOLVING NATIVE CORONARY ARTERY OF NATIVE HEART WITHOUT ANGINA PECTORIS: ICD-10-CM

## 2022-08-18 DIAGNOSIS — I49.3 PVC (PREMATURE VENTRICULAR CONTRACTION): ICD-10-CM

## 2022-08-18 DIAGNOSIS — Z12.5 SPECIAL SCREENING FOR MALIGNANT NEOPLASM OF PROSTATE: ICD-10-CM

## 2022-08-18 DIAGNOSIS — F52.21 ED (ERECTILE DYSFUNCTION) OF NON-ORGANIC ORIGIN: ICD-10-CM

## 2022-08-18 DIAGNOSIS — M54.50 CHRONIC LOW BACK PAIN, UNSPECIFIED BACK PAIN LATERALITY, UNSPECIFIED WHETHER SCIATICA PRESENT: ICD-10-CM

## 2022-08-18 DIAGNOSIS — M15.9 POLYARTICULAR OSTEOARTHRITIS: ICD-10-CM

## 2022-08-18 DIAGNOSIS — G89.29 CHRONIC LOW BACK PAIN, UNSPECIFIED BACK PAIN LATERALITY, UNSPECIFIED WHETHER SCIATICA PRESENT: ICD-10-CM

## 2022-08-18 DIAGNOSIS — K21.9 GASTRO-ESOPHAGEAL REFLUX DISEASE WITHOUT ESOPHAGITIS: ICD-10-CM

## 2022-08-18 DIAGNOSIS — N13.8 BENIGN PROSTATIC HYPERPLASIA WITH URINARY OBSTRUCTION: ICD-10-CM

## 2022-08-18 DIAGNOSIS — E78.2 MIXED HYPERLIPIDEMIA: ICD-10-CM

## 2022-08-18 DIAGNOSIS — I12.9 HYPERTENSIVE CHRONIC KIDNEY DISEASE WITH STAGE 1 THROUGH STAGE 4 CHRONIC KIDNEY DISEASE, OR UNSPECIFIED CHRONIC KIDNEY DISEASE: ICD-10-CM

## 2022-08-18 DIAGNOSIS — E87.5 HYPERKALEMIA: ICD-10-CM

## 2022-08-18 DIAGNOSIS — R73.03 PREDIABETES: ICD-10-CM

## 2022-08-18 DIAGNOSIS — N18.2 CHRONIC KIDNEY DISEASE, STAGE 2 (MILD): ICD-10-CM

## 2022-08-18 DIAGNOSIS — Z87.442 PERSONAL HISTORY OF URINARY CALCULI: ICD-10-CM

## 2022-08-18 PROCEDURE — G0439 PPPS, SUBSEQ VISIT: HCPCS | Mod: S$GLB,,, | Performed by: INTERNAL MEDICINE

## 2022-08-18 PROCEDURE — 1101F PR PT FALLS ASSESS DOC 0-1 FALLS W/OUT INJ PAST YR: ICD-10-PCS | Mod: CPTII,S$GLB,, | Performed by: INTERNAL MEDICINE

## 2022-08-18 PROCEDURE — 99213 PR OFFICE/OUTPT VISIT, EST, LEVL III, 20-29 MIN: ICD-10-PCS | Mod: 25,S$GLB,, | Performed by: INTERNAL MEDICINE

## 2022-08-18 PROCEDURE — 1123F ACP DISCUSS/DSCN MKR DOCD: CPT | Mod: CPTII,S$GLB,, | Performed by: INTERNAL MEDICINE

## 2022-08-18 PROCEDURE — 1126F AMNT PAIN NOTED NONE PRSNT: CPT | Mod: CPTII,S$GLB,, | Performed by: INTERNAL MEDICINE

## 2022-08-18 PROCEDURE — 99499 UNLISTED E&M SERVICE: CPT | Mod: S$GLB,,, | Performed by: INTERNAL MEDICINE

## 2022-08-18 PROCEDURE — 1123F PR ADV CARE PLAN DISCUSSED, PLAN OR SURROGATE DOCUMENTED: ICD-10-PCS | Mod: CPTII,S$GLB,, | Performed by: INTERNAL MEDICINE

## 2022-08-18 PROCEDURE — 99497 ADVNCD CARE PLAN 30 MIN: CPT | Mod: S$GLB,,, | Performed by: INTERNAL MEDICINE

## 2022-08-18 PROCEDURE — 99497 PR ADVNCD CARE PLAN 30 MIN: ICD-10-PCS | Mod: S$GLB,,, | Performed by: INTERNAL MEDICINE

## 2022-08-18 PROCEDURE — 3288F PR FALLS RISK ASSESSMENT DOCUMENTED: ICD-10-PCS | Mod: CPTII,S$GLB,, | Performed by: INTERNAL MEDICINE

## 2022-08-18 PROCEDURE — 1160F RVW MEDS BY RX/DR IN RCRD: CPT | Mod: CPTII,S$GLB,, | Performed by: INTERNAL MEDICINE

## 2022-08-18 PROCEDURE — 1160F PR REVIEW ALL MEDS BY PRESCRIBER/CLIN PHARMACIST DOCUMENTED: ICD-10-PCS | Mod: CPTII,S$GLB,, | Performed by: INTERNAL MEDICINE

## 2022-08-18 PROCEDURE — 3288F FALL RISK ASSESSMENT DOCD: CPT | Mod: CPTII,S$GLB,, | Performed by: INTERNAL MEDICINE

## 2022-08-18 PROCEDURE — 1126F PR PAIN SEVERITY QUANTIFIED, NO PAIN PRESENT: ICD-10-PCS | Mod: CPTII,S$GLB,, | Performed by: INTERNAL MEDICINE

## 2022-08-18 PROCEDURE — 99213 OFFICE O/P EST LOW 20 MIN: CPT | Mod: 25,S$GLB,, | Performed by: INTERNAL MEDICINE

## 2022-08-18 PROCEDURE — 1101F PT FALLS ASSESS-DOCD LE1/YR: CPT | Mod: CPTII,S$GLB,, | Performed by: INTERNAL MEDICINE

## 2022-08-18 PROCEDURE — G0439 PR MEDICARE ANNUAL WELLNESS SUBSEQUENT VISIT: ICD-10-PCS | Mod: S$GLB,,, | Performed by: INTERNAL MEDICINE

## 2022-08-18 PROCEDURE — 1159F MED LIST DOCD IN RCRD: CPT | Mod: CPTII,S$GLB,, | Performed by: INTERNAL MEDICINE

## 2022-08-18 PROCEDURE — 1159F PR MEDICATION LIST DOCUMENTED IN MEDICAL RECORD: ICD-10-PCS | Mod: CPTII,S$GLB,, | Performed by: INTERNAL MEDICINE

## 2022-08-18 PROCEDURE — 99499 RISK ADDL DX/OHS AUDIT: ICD-10-PCS | Mod: S$GLB,,, | Performed by: INTERNAL MEDICINE

## 2022-08-18 RX ORDER — DICYCLOMINE HYDROCHLORIDE 10 MG/1
10 CAPSULE ORAL 2 TIMES DAILY PRN
COMMUNITY
Start: 2022-07-25

## 2022-08-18 NOTE — ACP (ADVANCE CARE PLANNING)
"  Advance Care Planning/ ACP was discussed with patient face-to-face.    I initiated the discussion with this patient about ADVANCE CARE PLANNING/ ACP.   The patient was not accompanied by any family member or friend.    The concept of Advance Care Planning/ACP was explained, and patient reverberated understanding.  The patient was informed that participation in this discussion about ACP is absolutely voluntary, and is not mandatory.    Topics explains and discussed: Advance Directive/ " Medical Living Will", Health Care Proxy/ "Health Care Durable Power of , and Do Not Resuscitate/ Do not Intubate.   A package of Educational  material, Forms, and Templates, was given to the patient, as well as online resources addresses.  Patient was informed that 'patients have the right to change their minds at any time".  Patients can call or come in person to the office for help and/ or for questions that may arise, or for help in filling the template.     At the end of today's visit, patient:     -has already and advance directive, patient is satisfied with his testing advanced directive and has no intention of making any changes.  Indication material about advanced direction were provided.    The Education material that was given to the patient is including:  [place] ADVANCE DIRECTIVE PLANNING FOR HEALTH CARE DECISIONS.  & General information about advance care planning in writing      Time spent was less than 30 minutes,  16+ minutes.      "

## 2022-08-18 NOTE — PROGRESS NOTES
Chief C/o:    Coronary Artery Disease, Chronic Kidney Disease, Hyperlipidemia, and Pre-diabetes        Health Care Maintenance    Health Maintenance       Date Due Completion Date    Influenza Vaccine (1) 09/01/2022 10/18/2021    Aspirin/Antiplatelet Therapy 07/11/2023 7/11/2022    Colonoscopy 07/22/2023 7/22/2020    Override on 2/2/2018: Done    Override on 9/9/2013: Done    TETANUS VACCINE 04/25/2024 4/25/2014    Lipid Panel 05/11/2027 5/11/2022                 HISTORY OF PRESENT ILLNESS:    HPI    Geronimo uG is a 81 y.o. male who presents to the clinic today for Coronary Artery Disease, Chronic Kidney Disease, Hyperlipidemia, and Pre-diabetes  . Patient is having no chest pain, no shortness of breath, no fever no chills.  Patient is having no side effects related to current medications.                  ALLERGIES AND MEDICATIONS: updated and reviewed.  Review of patient's allergies indicates:   Allergen Reactions    Lipitor [atorvastatin]      Pain/cramping    Pcn [penicillins] Hives    Pravastatin      Pain/cramping    Simvastatin     Statins-hmg-coa reductase inhibitors      Medication List with Changes/Refills   Current Medications    ALCOHOL SWABS (BD ALCOHOL SWABS) PADM    Apply 1 each topically once daily.    ALLOPURINOL (ZYLOPRIM) 100 MG TABLET    Take 1 tablet (100 mg total) by mouth every morning.    AMLODIPINE (NORVASC) 5 MG TABLET    Take 5 mg by mouth every morning.     ASPIRIN (ECOTRIN) 81 MG EC TABLET    Take 1 tablet (81 mg total) by mouth once daily.    CHOLECALCIFEROL, VITAMIN D3, (VITAMIN D3) 50 MCG (2,000 UNIT) TAB    Take 1 tablet (2,000 Units total) by mouth once daily.    DICYCLOMINE (BENTYL) 10 MG CAPSULE    Take 10 mg by mouth 2 (two) times daily as needed.    ESOMEPRAZOLE (NEXIUM) 20 MG CAPSULE    Take 1 capsule (20 mg total) by mouth before breakfast.    EVOLOCUMAB (REPATHA SURECLICK) 140 MG/ML PNIJ    Inject 1 mL into the skin.    FINASTERIDE (PROSCAR) 5 MG TABLET    Take 1  tablet (5 mg total) by mouth every evening.    HYDROCHLOROTHIAZIDE (HYDRODIURIL) 25 MG TABLET    TAKE 1 TABLET EVERY MORNING    TAMSULOSIN (FLOMAX) 0.4 MG CAP    TAKE 1 CAPSULE ONE TIME DAILY IN THE EVENING       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    Hypertensive chronic kidney disease with stage 1 through stage 4 chronic kidney disease, or unspecified chronic kidney disease    Hyperkalemia    Gastro-esophageal reflux disease without esophagitis    Chronic obstructive pulmonary disease, unspecified    Chronic low back pain    Carpal tunnel syndrome, bilateral upper limbs    Coronary artery disease involving native coronary artery of native heart without angina pectoris    PVC (premature ventricular contraction)    Statin intolerance    S/P laparoscopic cholecystectomy    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    ED (erectile dysfunction) of non-organic origin    BMI 28.0-28.9,adult    History of COVID-19         CARE TEAM:    Patient Care Team:  Paresh Escobedo MD as PCP - General (Internal Medicine)  Belinda Rinaldi LPN as Licensed Practical Nurse  Edy Varela MD (Cardiology)  Robert Mendieta MD (Pain Medicine)  Dami Noonan II, MD as Consulting Physician (Gastroenterology)  SIMONE Martinez MD as Consulting Physician (Urology)  Duane Christy MD as Consulting Physician (Ophthalmology)         REVIEW OF SYSTEMS:    Review of Systems   Constitutional: Negative for appetite change, chills, diaphoresis, fatigue, fever and  "unexpected weight change.   HENT: Negative for congestion, drooling, ear discharge, ear pain, facial swelling, hearing loss, nosebleeds, postnasal drip, rhinorrhea, sinus pain, sneezing, sore throat, tinnitus, trouble swallowing and voice change.    Eyes: Negative for pain, discharge, redness, itching and visual disturbance.   Respiratory: Negative for cough, choking, chest tightness, shortness of breath, wheezing and stridor.    Cardiovascular: Negative for chest pain, palpitations and leg swelling.   Gastrointestinal: Negative for abdominal distention, abdominal pain, blood in stool, constipation, diarrhea, nausea and vomiting.   Endocrine: Negative for cold intolerance, heat intolerance, polydipsia, polyphagia and polyuria.   Genitourinary: Negative for difficulty urinating, dysuria, flank pain, frequency, hematuria and urgency.   Musculoskeletal: Positive for arthralgias. Negative for back pain, gait problem, joint swelling, myalgias, neck pain and neck stiffness.   Skin: Negative for color change, pallor, rash and wound.   Allergic/Immunologic: Negative for environmental allergies, food allergies and immunocompromised state.   Neurological: Negative for dizziness, tremors, seizures, syncope, speech difficulty, weakness, light-headedness, numbness and headaches.   Hematological: Negative for adenopathy. Does not bruise/bleed easily.   Psychiatric/Behavioral: Negative for agitation, behavioral problems, confusion, decreased concentration, dysphoric mood, hallucinations, sleep disturbance and suicidal ideas. The patient is not nervous/anxious.          PHYSICAL EXAM:    Vitals:    08/18/22 0844   BP: 105/61   Pulse: (!) 50   Temp: 97.4 °F (36.3 °C)     Weight: 82.9 kg (182 lb 10.4 oz)   Height: 5' 7.01" (170.2 cm)   Body mass index is 28.6 kg/m².  Vitals:    08/18/22 0844   BP: 105/61   Pulse: (!) 50   Temp: 97.4 °F (36.3 °C)   TempSrc: Temporal   Weight: 82.9 kg (182 lb 10.4 oz)   Height: 5' 7.01" (1.702 m) "   PainSc: 0-No pain          Physical Exam  Vitals and nursing note reviewed.   Constitutional:       General: He is not in acute distress.     Appearance: He is not ill-appearing, toxic-appearing or diaphoretic.      Comments: Patient is overweight.    Patient is alert, awake and oriented X 3.  Patient is comfortable, cooperative and in no apparent distress.     HENT:      Head: Normocephalic and atraumatic.      Right Ear: Tympanic membrane, ear canal and external ear normal. There is no impacted cerumen.      Left Ear: Tympanic membrane, ear canal and external ear normal. There is no impacted cerumen.      Nose: Nose normal. No congestion or rhinorrhea.      Mouth/Throat:      Mouth: Mucous membranes are moist.      Pharynx: Oropharynx is clear. No oropharyngeal exudate or posterior oropharyngeal erythema.   Eyes:      General: No scleral icterus.        Right eye: No discharge.         Left eye: No discharge.      Extraocular Movements: Extraocular movements intact.      Conjunctiva/sclera: Conjunctivae normal.      Pupils: Pupils are equal, round, and reactive to light.   Cardiovascular:      Rate and Rhythm: Regular rhythm. Bradycardia present.      Pulses: Normal pulses.      Heart sounds: Murmur heard.     No friction rub. No gallop.   Pulmonary:      Effort: Pulmonary effort is normal. No respiratory distress.      Breath sounds: Normal breath sounds. No stridor. No wheezing, rhonchi or rales.   Chest:      Chest wall: No tenderness.   Abdominal:      General: Bowel sounds are normal. There is no distension.      Palpations: Abdomen is soft. There is no mass.      Tenderness: There is no abdominal tenderness. There is no guarding or rebound.      Hernia: No hernia is present.   Musculoskeletal:         General: No swelling, tenderness, deformity or signs of injury. Normal range of motion.      Cervical back: Normal range of motion and neck supple. No rigidity.      Right lower leg: No edema.      Left lower  leg: No edema.      Comments: Crepitations knees.   Lymphadenopathy:      Cervical: No cervical adenopathy.   Skin:     General: Skin is warm and dry.      Capillary Refill: Capillary refill takes less than 2 seconds.      Coloration: Skin is not jaundiced or pale.      Findings: No bruising, erythema, lesion or rash.   Neurological:      General: No focal deficit present.      Mental Status: He is alert and oriented to person, place, and time.      Cranial Nerves: No cranial nerve deficit.      Sensory: No sensory deficit.      Motor: No weakness.      Coordination: Coordination normal.      Deep Tendon Reflexes: Reflexes normal.   Psychiatric:         Mood and Affect: Mood normal.         Behavior: Behavior normal.         Thought Content: Thought content normal.         Judgment: Judgment normal.            Labs:    Lab Results   Component Value Date    GLU 93 05/11/2022     05/11/2022    K 4.6 05/11/2022     05/11/2022    CO2 29 05/11/2022    BUN 26 (H) 05/11/2022    CREATININE 1.12 (H) 05/11/2022    CALCIUM 9.5 05/11/2022    PROT 6.5 05/11/2022    ALBUMIN 3.9 05/11/2022    BILITOT 0.6 05/11/2022    AST 25 05/11/2022    ALT 53 (H) 05/11/2022    ESTGFRAFRICA 70 05/11/2022    EGFRNONAA 60 05/11/2022     Lab Results   Component Value Date    WBC 7.9 05/11/2022    RBC 4.34 05/11/2022    HGB 13.3 05/11/2022    HCT 40.9 05/11/2022    MCV 94.2 05/11/2022    RDW 12.6 05/11/2022     05/11/2022      Lab Results   Component Value Date    CHOL 127 05/11/2022    TRIG 45 05/11/2022    TRIG 40 09/05/2019    HDL 46 05/11/2022    HDL 58 09/05/2019    LDLCALC 68 05/11/2022     Lab Results   Component Value Date    TSH 1.90 05/11/2022     Lab Results   Component Value Date    HGBA1C 5.4 05/11/2022    HGBA1C 5.5 09/05/2019      No components found for: MICROALBUMIN/CREATININE    ASSESSMENT & PLAN:    1. Encounter for general adult medical examination with abnormal findings    2. Hypertensive chronic kidney  disease with stage 1 through stage 4 chronic kidney disease, or unspecified chronic kidney disease  The current medical regimen is effective;  continue present plan and medications.  3. Chronic kidney disease, stage 2 (mild)  - Comprehensive Metabolic Panel; Future  - Comprehensive Metabolic Panel  Mild and stable, will keep monitoring at intervals  4. Mixed hyperlipidemia  - Lipid Panel; Future  - Lipid Panel  The current medical regimen is effective;  continue present plan and medications.  5. PAD (peripheral artery disease)  Continue antiplatelets-anticoagulation and statin to decrease chances of progression, in addition to diet, exercise, and weight management.  6. Coronary artery disease involving native coronary artery of native heart without angina pectoris  Continue antiplatelets-anticoagulation and statin to decrease chances of progression, in addition to diet, exercise, and weight management.  7. Occlusion and stenosis of bilateral carotid arteries  Continue antiplatelets-anticoagulation and statin to decrease chances of progression, in addition to diet, exercise, and weight management.  8. Chronic obstructive pulmonary disease, unspecified COPD type  Asymptomatic currently  9. Prediabetes  - Hemoglobin A1C; Future  - Hemoglobin A1C  Blood sugar is in the range of prediabetes.  In simple words, blood sugar is elevated more than the upper limit of normal, and less than that of Diabetes Mellitus.   Healthy diet, exercise and weight management are essential, to prevent or decrease chances of progression to f clinical Diabetes Mellitus.  10. Gastro-esophageal reflux disease without esophagitis  Asymptomatic currently  11. Benign prostatic hyperplasia with urinary obstruction  The current medical regimen is effective;  continue present plan and medications.  12. PVC (premature ventricular contraction)  In sinus bradycardia currently  13. BMI 28.0-28.9,adult  Healthy diet, exercise, and weight monitoring are  essential for weight management.    Weight loss was discussed.  Ultimate goal is BMI below 25.   14. ED (erectile dysfunction) of non-organic origin    15. Hyperkalemia   Last potassium was normal  16. Personal history of urinary calculi    17. Chronic low back pain, unspecified back pain laterality, unspecified whether sciatica present    18. 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 Polyarticular osteoarthritis    20. Other reduced mobility    21. Special screening for malignant neoplasm of prostate  - PSA, Screening; Future  - PSA, Screening             Orders Placed This Encounter   Procedures    Comprehensive Metabolic Panel    Lipid Panel    Hemoglobin A1C    PSA, Screening      Follow up in about 3 months (around 11/18/2022), or if symptoms worsen or fail to improve. or sooner as needed.    Patient was counseled and questions and concerns were addressed.    Please note:  Parts of this report were done using a dictation software, voice to text, and sometimes the text contains some uncorrected words or sentences that are missed during revision.

## 2022-08-18 NOTE — PROGRESS NOTES
Geronimo Gu presented for a follow-up Medicare AWV today. The following components were reviewed and updated:    · Medical history  · Family History  · Social history  · Allergies and Current Medications  · Health Risk Assessment  · Health Maintenance  · Care Team    The following assessments were completed:  · Depression Screening  · Cognitive function Screening  · Timed Get Up Test  · Hearing screening.    Annual wellness visit  was completed during this visit as well as Advance Care Planning.  Paper based questionnaire and assessment were completed and will be scanned to the media section of patient's EHR records..    Family History   Problem Relation Age of Onset    Heart disease Father     Lung disease Father     Heart disease Mother     Hypertension Mother     Diabetes Mother     Crohn's disease Sister     Diabetes Brother     Diabetes Brother      Social Determinants of Health     Tobacco Use: Medium Risk    Smoking Tobacco Use: Former Smoker    Smokeless Tobacco Use: Never Used   Alcohol Use: Not on file   Financial Resource Strain: Not on file   Food Insecurity: Not on file   Transportation Needs: Not on file   Physical Activity: Not on file   Stress: Not on file   Social Connections: Not on file   Housing Stability: Not on file   Depression PHQ-4: Low Risk     Last PHQ Score: 0     Review of patient's allergies indicates:   Allergen Reactions    Lipitor [atorvastatin]      Pain/cramping    Pcn [penicillins] Hives    Pravastatin      Pain/cramping    Simvastatin     Statins-hmg-coa reductase inhibitors      Patient Active Problem List   Diagnosis    Mixed hyperlipidemia    Prediabetes    Chronic kidney disease, stage 2 (mild)    Benign prostatic hyperplasia with urinary obstruction    Polyarticular osteoarthritis    Personal history of urinary calculi    PAD (peripheral artery disease)    Other reduced mobility    Occlusion and stenosis of bilateral carotid arteries     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    Hypertensive chronic kidney disease with stage 1 through stage 4 chronic kidney disease, or unspecified chronic kidney disease    Hyperkalemia    Gastro-esophageal reflux disease without esophagitis    Chronic obstructive pulmonary disease, unspecified    Chronic low back pain    Carpal tunnel syndrome, bilateral upper limbs    Coronary artery disease involving native coronary artery of native heart without angina pectoris    PVC (premature ventricular contraction)    Statin intolerance    S/P laparoscopic cholecystectomy    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    ED (erectile dysfunction) of non-organic origin    BMI 28.0-28.9,adult    History of COVID-19     Past Surgical History:   Procedure Laterality Date    CARDIAC CATHETERIZATION      May 2017    CAROTID ENDARTERECTOMY      CARPAL TUNNEL RELEASE Bilateral     each hand    cholecestectomy  08/10/2020    date provided by patient    CYSTOSCOPY      ROTATOR CUFF REPAIR Bilateral     rotator cuff surgery       Rt side x2, Lt side x1    TONSILLECTOMY       Current Outpatient Medications on File Prior to Visit   Medication Sig Dispense Refill    alcohol swabs (BD ALCOHOL SWABS) PadM Apply 1 each topically once daily. 100 each 3    allopurinoL (ZYLOPRIM) 100 MG tablet Take 1 tablet (100 mg total) by mouth every morning. 90 tablet 3    amLODIPine (NORVASC) 5 MG tablet Take 5 mg by mouth every morning.       aspirin (ECOTRIN) 81 MG EC tablet Take 1 tablet (81 mg total) by mouth once daily. (Patient taking differently: Take 81 mg by mouth every evening.)      cholecalciferol, vitamin D3, (VITAMIN D3) 50 mcg (2,000 unit) Tab Take 1 tablet (2,000 Units total) by mouth once daily.      dicyclomine (BENTYL) 10 MG capsule Take 10 mg by mouth 2 (two) times daily as needed.      esomeprazole (NEXIUM) 20 MG capsule Take 1 capsule (20 mg total) by mouth  before breakfast. 90 capsule 2    evolocumab (REPATHA SURECLICK) 140 mg/mL PnIj Inject 1 mL into the skin.      finasteride (PROSCAR) 5 mg tablet Take 1 tablet (5 mg total) by mouth every evening. 90 tablet 3    hydroCHLOROthiazide (HYDRODIURIL) 25 MG tablet TAKE 1 TABLET EVERY MORNING      tamsulosin (FLOMAX) 0.4 mg Cap TAKE 1 CAPSULE ONE TIME DAILY IN THE EVENING 90 capsule 3     No current facility-administered medications on file prior to visit.     Review of Systems   Constitutional: Negative for appetite change, chills, diaphoresis, fatigue, fever and unexpected weight change.   HENT: Negative for congestion, drooling, ear discharge, ear pain, facial swelling, hearing loss, nosebleeds, postnasal drip, rhinorrhea, sinus pain, sneezing, sore throat, tinnitus, trouble swallowing and voice change.    Eyes: Negative for pain, discharge, redness, itching and visual disturbance.   Respiratory: Negative for cough, choking, chest tightness, shortness of breath, wheezing and stridor.    Cardiovascular: Negative for chest pain, palpitations and leg swelling.   Gastrointestinal: Negative for abdominal distention, abdominal pain, blood in stool, constipation, diarrhea, nausea and vomiting.   Endocrine: Negative for cold intolerance, heat intolerance, polydipsia, polyphagia and polyuria.   Genitourinary: Negative for difficulty urinating, dysuria, flank pain, frequency, hematuria and urgency.   Musculoskeletal: Positive for arthralgias. Negative for back pain, gait problem, joint swelling, myalgias, neck pain and neck stiffness.   Skin: Negative for color change, pallor, rash and wound.   Allergic/Immunologic: Negative for environmental allergies, food allergies and immunocompromised state.   Neurological: Negative for dizziness, tremors, seizures, syncope, speech difficulty, weakness, light-headedness, numbness and headaches.   Hematological: Negative for adenopathy. Does not bruise/bleed easily.  "  Psychiatric/Behavioral: Negative for agitation, behavioral problems, confusion, decreased concentration, dysphoric mood, hallucinations, sleep disturbance and suicidal ideas. The patient is not nervous/anxious.        Vitals:    08/18/22 0844   BP: 105/61   BP Location: Left arm   Patient Position: Sitting   BP Method: Small (Automatic)   Pulse: (!) 50   Temp: 97.4 °F (36.3 °C)   TempSrc: Temporal   Weight: 82.9 kg (182 lb 10.4 oz)   Height: 5' 7.01" (1.702 m)     Body mass index is 28.6 kg/m².   ]        Diagnoses and health risks identified today and associated recommendations/orders:  1. Encounter for general adult medical examination with abnormal findings    2. Hypertensive chronic kidney disease with stage 1 through stage 4 chronic kidney disease, or unspecified chronic kidney disease  The current medical regimen is effective;  continue present plan and medications.  3. Chronic kidney disease, stage 2 (mild)  - Comprehensive Metabolic Panel; Future  - Comprehensive Metabolic Panel  Mild and stable, will keep monitoring at intervals  4. Mixed hyperlipidemia  - Lipid Panel; Future  - Lipid Panel  The current medical regimen is effective;  continue present plan and medications.  5. PAD (peripheral artery disease)  Continue antiplatelets-anticoagulation and statin to decrease chances of progression, in addition to diet, exercise, and weight management.  6. Coronary artery disease involving native coronary artery of native heart without angina pectoris  Continue antiplatelets-anticoagulation and statin to decrease chances of progression, in addition to diet, exercise, and weight management.  7. Occlusion and stenosis of bilateral carotid arteries  Continue antiplatelets-anticoagulation and statin to decrease chances of progression, in addition to diet, exercise, and weight management.  8. Chronic obstructive pulmonary disease, unspecified COPD type  Asymptomatic currently  9. Prediabetes  - Hemoglobin A1C; " Future  - Hemoglobin A1C  Blood sugar is in the range of prediabetes.  In simple words, blood sugar is elevated more than the upper limit of normal, and less than that of Diabetes Mellitus.   Healthy diet, exercise and weight management are essential, to prevent or decrease chances of progression to f clinical Diabetes Mellitus.  10. Gastro-esophageal reflux disease without esophagitis  Asymptomatic currently  11. Benign prostatic hyperplasia with urinary obstruction  The current medical regimen is effective;  continue present plan and medications.  12. PVC (premature ventricular contraction)  In sinus bradycardia currently  13. BMI 28.0-28.9,adult  Healthy diet, exercise, and weight monitoring are essential for weight management.    Weight loss was discussed.  Ultimate goal is BMI below 25.   14. ED (erectile dysfunction) of non-organic origin    15. Hyperkalemia   Last potassium was normal  16. Personal history of urinary calculi    17. Chronic low back pain, unspecified back pain laterality, unspecified whether sciatica present    18. 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 Polyarticular osteoarthritis    20. Other reduced mobility    21. Special screening for malignant neoplasm of prostate  - PSA, Screening; Future  - PSA, Screening         Provided Geronimo with a 5-10 year written screening schedule and personal prevention plan. Recommendations were developed using the USPSTF age appropriate recommendations. Education, counseling, and referrals were provided as needed.  After Visit Summary printed and given to patient which includes a list of additional screenings\tests needed.    Follow up in about 3 months (around 11/18/2022), or if symptoms worsen or fail to improve.      Paresh Escobedo MD

## 2022-11-12 LAB
ALBUMIN SERPL-MCNC: 4.1 G/DL (ref 3.6–5.1)
ALBUMIN/GLOB SERPL: 1.5 (CALC) (ref 1–2.5)
ALP SERPL-CCNC: 75 U/L (ref 35–144)
ALT SERPL-CCNC: 12 U/L (ref 9–46)
AST SERPL-CCNC: 17 U/L (ref 10–35)
BILIRUB SERPL-MCNC: 0.9 MG/DL (ref 0.2–1.2)
BUN SERPL-MCNC: 17 MG/DL (ref 7–25)
BUN/CREAT SERPL: NORMAL (CALC) (ref 6–22)
CALCIUM SERPL-MCNC: 9.7 MG/DL (ref 8.6–10.3)
CHLORIDE SERPL-SCNC: 104 MMOL/L (ref 98–110)
CHOLEST SERPL-MCNC: 145 MG/DL
CHOLEST/HDLC SERPL: 2 (CALC)
CO2 SERPL-SCNC: 31 MMOL/L (ref 20–32)
CREAT SERPL-MCNC: 0.99 MG/DL (ref 0.7–1.22)
EGFR: 75 ML/MIN/1.73M2
GLOBULIN SER CALC-MCNC: 2.7 G/DL (CALC) (ref 1.9–3.7)
GLUCOSE SERPL-MCNC: 97 MG/DL (ref 65–99)
HBA1C MFR BLD: 5.2 % OF TOTAL HGB
HDLC SERPL-MCNC: 72 MG/DL
LDLC SERPL CALC-MCNC: 61 MG/DL (CALC)
NONHDLC SERPL-MCNC: 73 MG/DL (CALC)
POTASSIUM SERPL-SCNC: 4.6 MMOL/L (ref 3.5–5.3)
PROT SERPL-MCNC: 6.8 G/DL (ref 6.1–8.1)
PSA SERPL-MCNC: 0.56 NG/ML
SODIUM SERPL-SCNC: 143 MMOL/L (ref 135–146)
TRIGL SERPL-MCNC: 41 MG/DL

## 2022-11-17 ENCOUNTER — OFFICE VISIT (OUTPATIENT)
Dept: INTERNAL MEDICINE | Facility: CLINIC | Age: 82
End: 2022-11-17
Payer: MEDICARE

## 2022-11-17 VITALS
SYSTOLIC BLOOD PRESSURE: 107 MMHG | WEIGHT: 185.44 LBS | BODY MASS INDEX: 29.1 KG/M2 | DIASTOLIC BLOOD PRESSURE: 61 MMHG | HEART RATE: 62 BPM | HEIGHT: 67 IN | TEMPERATURE: 98 F

## 2022-11-17 DIAGNOSIS — M54.50 CHRONIC LOW BACK PAIN, UNSPECIFIED BACK PAIN LATERALITY, UNSPECIFIED WHETHER SCIATICA PRESENT: ICD-10-CM

## 2022-11-17 DIAGNOSIS — N18.2 CHRONIC KIDNEY DISEASE, STAGE 2 (MILD): ICD-10-CM

## 2022-11-17 DIAGNOSIS — E78.2 MIXED HYPERLIPIDEMIA: ICD-10-CM

## 2022-11-17 DIAGNOSIS — E79.0 HYPERURICEMIA WITHOUT SIGNS OF INFLAMMATORY ARTHRITIS AND TOPHACEOUS DISEASE: ICD-10-CM

## 2022-11-17 DIAGNOSIS — K21.9 GASTRO-ESOPHAGEAL REFLUX DISEASE WITHOUT ESOPHAGITIS: ICD-10-CM

## 2022-11-17 DIAGNOSIS — G89.29 CHRONIC LOW BACK PAIN, UNSPECIFIED BACK PAIN LATERALITY, UNSPECIFIED WHETHER SCIATICA PRESENT: ICD-10-CM

## 2022-11-17 DIAGNOSIS — R73.03 PREDIABETES: ICD-10-CM

## 2022-11-17 DIAGNOSIS — Z23 NEED FOR IMMUNIZATION AGAINST INFLUENZA: ICD-10-CM

## 2022-11-17 DIAGNOSIS — I49.3 PVC (PREMATURE VENTRICULAR CONTRACTION): ICD-10-CM

## 2022-11-17 DIAGNOSIS — I65.23 OCCLUSION AND STENOSIS OF BILATERAL CAROTID ARTERIES: ICD-10-CM

## 2022-11-17 DIAGNOSIS — I73.9 PAD (PERIPHERAL ARTERY DISEASE): ICD-10-CM

## 2022-11-17 DIAGNOSIS — N40.1 BENIGN PROSTATIC HYPERPLASIA WITH URINARY OBSTRUCTION: ICD-10-CM

## 2022-11-17 DIAGNOSIS — N13.8 BENIGN PROSTATIC HYPERPLASIA WITH URINARY OBSTRUCTION: ICD-10-CM

## 2022-11-17 DIAGNOSIS — I12.9 HYPERTENSIVE CHRONIC KIDNEY DISEASE WITH STAGE 1 THROUGH STAGE 4 CHRONIC KIDNEY DISEASE, OR UNSPECIFIED CHRONIC KIDNEY DISEASE: Primary | ICD-10-CM

## 2022-11-17 PROCEDURE — 1160F PR REVIEW ALL MEDS BY PRESCRIBER/CLIN PHARMACIST DOCUMENTED: ICD-10-PCS | Mod: CPTII,S$GLB,, | Performed by: INTERNAL MEDICINE

## 2022-11-17 PROCEDURE — 1126F PR PAIN SEVERITY QUANTIFIED, NO PAIN PRESENT: ICD-10-PCS | Mod: CPTII,S$GLB,, | Performed by: INTERNAL MEDICINE

## 2022-11-17 PROCEDURE — 3078F DIAST BP <80 MM HG: CPT | Mod: CPTII,S$GLB,, | Performed by: INTERNAL MEDICINE

## 2022-11-17 PROCEDURE — 1159F MED LIST DOCD IN RCRD: CPT | Mod: CPTII,S$GLB,, | Performed by: INTERNAL MEDICINE

## 2022-11-17 PROCEDURE — 1159F PR MEDICATION LIST DOCUMENTED IN MEDICAL RECORD: ICD-10-PCS | Mod: CPTII,S$GLB,, | Performed by: INTERNAL MEDICINE

## 2022-11-17 PROCEDURE — 1157F ADVNC CARE PLAN IN RCRD: CPT | Mod: CPTII,S$GLB,, | Performed by: INTERNAL MEDICINE

## 2022-11-17 PROCEDURE — G0008 ADMIN INFLUENZA VIRUS VAC: HCPCS | Mod: S$GLB,,, | Performed by: INTERNAL MEDICINE

## 2022-11-17 PROCEDURE — 3074F PR MOST RECENT SYSTOLIC BLOOD PRESSURE < 130 MM HG: ICD-10-PCS | Mod: CPTII,S$GLB,, | Performed by: INTERNAL MEDICINE

## 2022-11-17 PROCEDURE — 99214 PR OFFICE/OUTPT VISIT, EST, LEVL IV, 30-39 MIN: ICD-10-PCS | Mod: S$GLB,,, | Performed by: INTERNAL MEDICINE

## 2022-11-17 PROCEDURE — 90694 FLU VACCINE - QUADRIVALENT - ADJUVANTED: ICD-10-PCS | Mod: S$GLB,,, | Performed by: INTERNAL MEDICINE

## 2022-11-17 PROCEDURE — 1126F AMNT PAIN NOTED NONE PRSNT: CPT | Mod: CPTII,S$GLB,, | Performed by: INTERNAL MEDICINE

## 2022-11-17 PROCEDURE — G0008 FLU VACCINE - QUADRIVALENT - ADJUVANTED: ICD-10-PCS | Mod: S$GLB,,, | Performed by: INTERNAL MEDICINE

## 2022-11-17 PROCEDURE — 1157F PR ADVANCE CARE PLAN OR EQUIV PRESENT IN MEDICAL RECORD: ICD-10-PCS | Mod: CPTII,S$GLB,, | Performed by: INTERNAL MEDICINE

## 2022-11-17 PROCEDURE — 90694 VACC AIIV4 NO PRSRV 0.5ML IM: CPT | Mod: S$GLB,,, | Performed by: INTERNAL MEDICINE

## 2022-11-17 PROCEDURE — 99214 OFFICE O/P EST MOD 30 MIN: CPT | Mod: S$GLB,,, | Performed by: INTERNAL MEDICINE

## 2022-11-17 PROCEDURE — 3078F PR MOST RECENT DIASTOLIC BLOOD PRESSURE < 80 MM HG: ICD-10-PCS | Mod: CPTII,S$GLB,, | Performed by: INTERNAL MEDICINE

## 2022-11-17 PROCEDURE — 99499 RISK ADDL DX/OHS AUDIT: ICD-10-PCS | Mod: S$GLB,,, | Performed by: INTERNAL MEDICINE

## 2022-11-17 PROCEDURE — 1160F RVW MEDS BY RX/DR IN RCRD: CPT | Mod: CPTII,S$GLB,, | Performed by: INTERNAL MEDICINE

## 2022-11-17 PROCEDURE — 99499 UNLISTED E&M SERVICE: CPT | Mod: S$GLB,,, | Performed by: INTERNAL MEDICINE

## 2022-11-17 PROCEDURE — 3074F SYST BP LT 130 MM HG: CPT | Mod: CPTII,S$GLB,, | Performed by: INTERNAL MEDICINE

## 2022-11-17 NOTE — PROGRESS NOTES
Chief C/o:    Pre-diabetes (Lab Results), Hyperlipidemia, and Chronic Kidney Disease        Health Care Maintenance    Health Maintenance         Date Due Completion Date    COVID-19 Vaccine (5 - Booster for Pfizer series) 07/15/2022 5/20/2022    Colonoscopy 07/22/2023 7/22/2020    Override on 2/2/2018: Done    Override on 9/9/2013: Done    Aspirin/Antiplatelet Therapy 08/18/2023 8/18/2022    TETANUS VACCINE 04/25/2024 4/25/2014    Lipid Panel 11/11/2027 11/11/2022                   HISTORY OF PRESENT ILLNESS:    JUDE Gu is a 82 y.o. male who presents to the clinic today for Pre-diabetes (Lab Results), Hyperlipidemia, and Chronic Kidney Disease  . Patient is having no chest pain, no shortness of breath, no fever no chills.  Patient is having no side effects related to current medications.                    ALLERGIES AND MEDICATIONS: updated and reviewed.  Review of patient's allergies indicates:   Allergen Reactions    Lipitor [atorvastatin]      Pain/cramping    Pcn [penicillins] Hives    Pravastatin      Pain/cramping    Simvastatin     Statins-hmg-coa reductase inhibitors      Medication List with Changes/Refills   Current Medications    ALLOPURINOL (ZYLOPRIM) 100 MG TABLET    Take 1 tablet (100 mg total) by mouth every morning.    AMLODIPINE (NORVASC) 5 MG TABLET    Take 5 mg by mouth every morning.     ASPIRIN (ECOTRIN) 81 MG EC TABLET    Take 1 tablet (81 mg total) by mouth once daily.    CHOLECALCIFEROL, VITAMIN D3, (VITAMIN D3) 50 MCG (2,000 UNIT) TAB    Take 1 tablet (2,000 Units total) by mouth once daily.    DICYCLOMINE (BENTYL) 10 MG CAPSULE    Take 10 mg by mouth 2 (two) times daily as needed.    ESOMEPRAZOLE (NEXIUM) 20 MG CAPSULE    TAKE 1 CAPSULE EVERY DAY  BEFORE BREAKFAST.    EVOLOCUMAB (REPATHA SURECLICK) 140 MG/ML PNIJ    Inject 1 mL into the skin.    FINASTERIDE (PROSCAR) 5 MG TABLET    Take 1 tablet (5 mg total) by mouth every evening.    HYDROCHLOROTHIAZIDE (HYDRODIURIL) 25 MG  TABLET    TAKE 1 TABLET EVERY MORNING    TAMSULOSIN (FLOMAX) 0.4 MG CAP    TAKE 1 CAPSULE ONE TIME DAILY IN THE EVENING   Discontinued Medications    ALCOHOL SWABS (BD ALCOHOL SWABS) PADM    Apply 1 each topically once daily.       Problem List:  Patient Active Problem List   Diagnosis    Mixed hyperlipidemia    Prediabetes    Chronic kidney disease, stage 2 (mild)    Benign prostatic hyperplasia with urinary obstruction    Polyarticular osteoarthritis    Personal history of urinary calculi    PAD (peripheral artery disease)    Other reduced mobility    Occlusion and stenosis of bilateral carotid arteries    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    Hypertensive chronic kidney disease with stage 1 through stage 4 chronic kidney disease, or unspecified chronic kidney disease    Gastro-esophageal reflux disease without esophagitis    Chronic obstructive pulmonary disease, unspecified    Chronic low back pain    Carpal tunnel syndrome, bilateral upper limbs    Coronary artery disease involving native coronary artery of native heart without angina pectoris    PVC (premature ventricular contraction)    Statin intolerance    S/P laparoscopic cholecystectomy    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    ED (erectile dysfunction) of non-organic origin    BMI 29.0-29.9,adult    History of COVID-19         CARE TEAM:    Patient Care Team:  Paresh Escobedo MD as PCP - General (Internal Medicine)  Belinda Rinaldi LPN as Licensed Practical Nurse  dEy Varela MD (Cardiology)  Robert Mendieta MD (Pain Medicine)  Dami Noonan II, MD as Consulting Physician (Gastroenterology)  SIMONE Martinez MD as Consulting Physician (Urology)  Duane Christy MD as Consulting Physician (Ophthalmology)         REVIEW OF SYSTEMS:    Review of Systems   Constitutional:  Negative for appetite change, chills, diaphoresis, fatigue, fever and unexpected weight change.  "  HENT:  Negative for congestion, drooling, ear discharge, ear pain, facial swelling, hearing loss, nosebleeds, postnasal drip, rhinorrhea, sinus pain, sneezing, sore throat, tinnitus, trouble swallowing and voice change.    Eyes:  Negative for pain, discharge, redness, itching and visual disturbance.   Respiratory:  Negative for cough, choking, chest tightness, shortness of breath, wheezing and stridor.    Cardiovascular:  Negative for chest pain, palpitations and leg swelling.   Gastrointestinal:  Negative for abdominal distention, abdominal pain, blood in stool, constipation, diarrhea, nausea and vomiting.   Endocrine: Negative for cold intolerance, heat intolerance, polydipsia, polyphagia and polyuria.   Genitourinary:  Negative for difficulty urinating, dysuria, flank pain, frequency, hematuria and urgency.   Musculoskeletal:  Positive for arthralgias. Negative for back pain, gait problem, joint swelling, myalgias, neck pain and neck stiffness.   Skin:  Negative for color change, pallor, rash and wound.   Allergic/Immunologic: Negative for environmental allergies, food allergies and immunocompromised state.   Neurological:  Negative for dizziness, tremors, seizures, syncope, speech difficulty, weakness, light-headedness, numbness and headaches.   Hematological:  Negative for adenopathy. Does not bruise/bleed easily.   Psychiatric/Behavioral:  Negative for agitation, behavioral problems, confusion, decreased concentration, dysphoric mood, hallucinations, sleep disturbance and suicidal ideas. The patient is not nervous/anxious.        PHYSICAL EXAM:    Vitals:    11/17/22 0832   BP: 107/61   Pulse: 62   Temp: 97.9 °F (36.6 °C)     Weight: 84.1 kg (185 lb 6.5 oz)   Height: 5' 7.01" (170.2 cm)   Body mass index is 29.03 kg/m².  Vitals:    11/17/22 0832   BP: 107/61   Pulse: 62   Temp: 97.9 °F (36.6 °C)   TempSrc: Temporal   Weight: 84.1 kg (185 lb 6.5 oz)   Height: 5' 7.01" (1.702 m)   PainSc: 0-No pain    "       Physical Exam  Vitals and nursing note reviewed.   Constitutional:       General: He is not in acute distress.     Appearance: He is not ill-appearing, toxic-appearing or diaphoretic.      Comments: Patient is overweight.    Patient is alert, awake and oriented X 3.  Patient is comfortable, cooperative and in no apparent distress.     HENT:      Head: Normocephalic and atraumatic.      Right Ear: Tympanic membrane, ear canal and external ear normal. There is no impacted cerumen.      Left Ear: Tympanic membrane, ear canal and external ear normal. There is no impacted cerumen.      Nose: Nose normal. No congestion or rhinorrhea.      Mouth/Throat:      Mouth: Mucous membranes are moist.      Pharynx: Oropharynx is clear. No oropharyngeal exudate or posterior oropharyngeal erythema.   Eyes:      General: No scleral icterus.        Right eye: No discharge.         Left eye: No discharge.      Extraocular Movements: Extraocular movements intact.      Conjunctiva/sclera: Conjunctivae normal.      Pupils: Pupils are equal, round, and reactive to light.   Cardiovascular:      Rate and Rhythm: Regular rhythm. Bradycardia present.      Pulses: Normal pulses.      Heart sounds: Murmur heard.     No friction rub. No gallop.   Pulmonary:      Effort: Pulmonary effort is normal. No respiratory distress.      Breath sounds: Normal breath sounds. No stridor. No wheezing, rhonchi or rales.   Chest:      Chest wall: No tenderness.   Abdominal:      General: Bowel sounds are normal. There is no distension.      Palpations: Abdomen is soft. There is no mass.      Tenderness: There is no abdominal tenderness. There is no guarding or rebound.      Hernia: No hernia is present.   Musculoskeletal:         General: No swelling, tenderness, deformity or signs of injury. Normal range of motion.      Cervical back: Normal range of motion and neck supple. No rigidity.      Right lower leg: No edema.      Left lower leg: No edema.       Comments: Crepitations knees.   Lymphadenopathy:      Cervical: No cervical adenopathy.   Skin:     General: Skin is warm and dry.      Capillary Refill: Capillary refill takes less than 2 seconds.      Coloration: Skin is not jaundiced or pale.      Findings: No bruising, erythema, lesion or rash.   Neurological:      General: No focal deficit present.      Mental Status: He is alert and oriented to person, place, and time.      Cranial Nerves: No cranial nerve deficit.      Sensory: No sensory deficit.      Motor: No weakness.      Coordination: Coordination normal.      Deep Tendon Reflexes: Reflexes normal.   Psychiatric:         Mood and Affect: Mood normal.         Behavior: Behavior normal.         Thought Content: Thought content normal.         Judgment: Judgment normal.          Labs:  Discussed with patient.      Lab Results   Component Value Date    GLU 97 11/11/2022     11/11/2022    K 4.6 11/11/2022     11/11/2022    CO2 31 11/11/2022    BUN 17 11/11/2022    CREATININE 0.99 11/11/2022    CALCIUM 9.7 11/11/2022    PROT 6.8 11/11/2022    ALBUMIN 4.1 11/11/2022    BILITOT 0.9 11/11/2022    AST 17 11/11/2022    ALT 12 11/11/2022    ESTGFRAFRICA 70 05/11/2022    EGFRNONAA 60 05/11/2022     Lab Results   Component Value Date    WBC 7.9 05/11/2022    RBC 4.34 05/11/2022    HGB 13.3 05/11/2022    HCT 40.9 05/11/2022    MCV 94.2 05/11/2022    RDW 12.6 05/11/2022     05/11/2022      Lab Results   Component Value Date    CHOL 145 11/11/2022    TRIG 41 11/11/2022    TRIG 40 09/05/2019    HDL 72 11/11/2022    HDL 58 09/05/2019    LDLCALC 61 11/11/2022     Lab Results   Component Value Date    TSH 1.90 05/11/2022     Lab Results   Component Value Date    HGBA1C 5.2 11/11/2022    HGBA1C 5.5 09/05/2019      No components found for: MICROALBUMIN/CREATININE    ASSESSMENT & PLAN:    1. Hypertensive chronic kidney disease with stage 1 through stage 4 chronic kidney disease, or unspecified chronic kidney  disease  The current medical regimen is effective;  continue present plan and medications.    2. Chronic kidney disease, stage 2 (mild)  Mild and stable, will keep monitoring at intervals   3. Mixed hyperlipidemia  The current medical regimen is effective;  continue present plan and medications.    4. Occlusion and stenosis of bilateral carotid arteries  Continue antiplatelets-anticoagulation and statin to decrease chances of progression, in addition to diet, exercise, and weight management.    5. PAD (peripheral artery disease)  Continue antiplatelets-anticoagulation and statin to decrease chances of progression, in addition to diet, exercise, and weight management.    6. PVC (premature ventricular contraction)  In sinus rhythm currently  7. Prediabetes  Blood sugar is in the range of prediabetes.  In simple words, blood sugar is elevated more than the upper limit of normal, and less than that of Diabetes Mellitus.   Healthy diet, exercise and weight management are essential, to prevent or decrease chances of progression to f clinical Diabetes Mellitus.    8. Gastro-esophageal reflux disease without esophagitis  Asymptomatic  9. BMI 29.0-29.9,adult  Healthy diet, exercise, and weight monitoring are essential for weight management.    Weight loss was discussed.  Ultimate goal is BMI below 25.     10. 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    11. Benign prostatic hyperplasia with urinary obstruction    12. Chronic low back pain, unspecified back pain laterality, unspecified whether sciatica present    13. Need for immunization against influenza  - Influenza (FLUAD) - Quadrivalent (Adjuvanted) *Preferred* (65+) (PF)      Patient is doing very well, will continue current medications, and diet and exercise and weight loss were encouraged.      Orders Placed This Encounter   Procedures    Influenza (FLUAD) - Quadrivalent (Adjuvanted) *Preferred* (65+) (PF)      Follow up if symptoms  worsen or fail to improve. or sooner as needed.    Patient was counseled and questions and concerns were addressed.    Please note:  Parts of this report were done using a dictation software, voice to text, and sometimes the text contains some uncorrected words or sentences that are missed during revision.

## 2022-11-21 ENCOUNTER — OFFICE VISIT (OUTPATIENT)
Dept: UROLOGY | Facility: CLINIC | Age: 82
End: 2022-11-21
Payer: MEDICARE

## 2022-11-21 VITALS — BODY MASS INDEX: 28.79 KG/M2 | WEIGHT: 183.88 LBS

## 2022-11-21 DIAGNOSIS — R35.1 NOCTURIA MORE THAN TWICE PER NIGHT: ICD-10-CM

## 2022-11-21 DIAGNOSIS — N40.0 BPH WITHOUT URINARY OBSTRUCTION: Primary | ICD-10-CM

## 2022-11-21 DIAGNOSIS — R33.9 INCOMPLETE EMPTYING OF BLADDER: ICD-10-CM

## 2022-11-21 PROCEDURE — 1159F PR MEDICATION LIST DOCUMENTED IN MEDICAL RECORD: ICD-10-PCS | Mod: CPTII,S$GLB,, | Performed by: UROLOGY

## 2022-11-21 PROCEDURE — 1126F AMNT PAIN NOTED NONE PRSNT: CPT | Mod: CPTII,S$GLB,, | Performed by: UROLOGY

## 2022-11-21 PROCEDURE — 81001 URINALYSIS AUTO W/SCOPE: CPT | Mod: S$GLB,,, | Performed by: UROLOGY

## 2022-11-21 PROCEDURE — 1126F PR PAIN SEVERITY QUANTIFIED, NO PAIN PRESENT: ICD-10-PCS | Mod: CPTII,S$GLB,, | Performed by: UROLOGY

## 2022-11-21 PROCEDURE — 99999 PR PBB SHADOW E&M-EST. PATIENT-LVL III: CPT | Mod: PBBFAC,,, | Performed by: UROLOGY

## 2022-11-21 PROCEDURE — 99213 PR OFFICE/OUTPT VISIT, EST, LEVL III, 20-29 MIN: ICD-10-PCS | Mod: S$GLB,,, | Performed by: UROLOGY

## 2022-11-21 PROCEDURE — 3288F FALL RISK ASSESSMENT DOCD: CPT | Mod: CPTII,S$GLB,, | Performed by: UROLOGY

## 2022-11-21 PROCEDURE — 1160F RVW MEDS BY RX/DR IN RCRD: CPT | Mod: CPTII,S$GLB,, | Performed by: UROLOGY

## 2022-11-21 PROCEDURE — 1160F PR REVIEW ALL MEDS BY PRESCRIBER/CLIN PHARMACIST DOCUMENTED: ICD-10-PCS | Mod: CPTII,S$GLB,, | Performed by: UROLOGY

## 2022-11-21 PROCEDURE — 1101F PT FALLS ASSESS-DOCD LE1/YR: CPT | Mod: CPTII,S$GLB,, | Performed by: UROLOGY

## 2022-11-21 PROCEDURE — 1157F ADVNC CARE PLAN IN RCRD: CPT | Mod: CPTII,S$GLB,, | Performed by: UROLOGY

## 2022-11-21 PROCEDURE — 99999 PR PBB SHADOW E&M-EST. PATIENT-LVL III: ICD-10-PCS | Mod: PBBFAC,,, | Performed by: UROLOGY

## 2022-11-21 PROCEDURE — 99213 OFFICE O/P EST LOW 20 MIN: CPT | Mod: S$GLB,,, | Performed by: UROLOGY

## 2022-11-21 PROCEDURE — 1101F PR PT FALLS ASSESS DOC 0-1 FALLS W/OUT INJ PAST YR: ICD-10-PCS | Mod: CPTII,S$GLB,, | Performed by: UROLOGY

## 2022-11-21 PROCEDURE — 81001 PR  URINALYSIS, AUTO, W/SCOPE: ICD-10-PCS | Mod: S$GLB,,, | Performed by: UROLOGY

## 2022-11-21 PROCEDURE — 1159F MED LIST DOCD IN RCRD: CPT | Mod: CPTII,S$GLB,, | Performed by: UROLOGY

## 2022-11-21 PROCEDURE — 3288F PR FALLS RISK ASSESSMENT DOCUMENTED: ICD-10-PCS | Mod: CPTII,S$GLB,, | Performed by: UROLOGY

## 2022-11-21 PROCEDURE — 1157F PR ADVANCE CARE PLAN OR EQUIV PRESENT IN MEDICAL RECORD: ICD-10-PCS | Mod: CPTII,S$GLB,, | Performed by: UROLOGY

## 2022-11-21 RX ORDER — FINASTERIDE 5 MG/1
5 TABLET, FILM COATED ORAL NIGHTLY
Qty: 90 TABLET | Refills: 3 | Status: SHIPPED | OUTPATIENT
Start: 2022-11-21 | End: 2024-01-31 | Stop reason: SDUPTHER

## 2022-11-21 RX ORDER — TAMSULOSIN HYDROCHLORIDE 0.4 MG/1
CAPSULE ORAL
Qty: 90 CAPSULE | Refills: 3 | Status: SHIPPED | OUTPATIENT
Start: 2022-11-21

## 2022-11-21 NOTE — PROGRESS NOTES
Subjective:       Patient ID: Geronimo Gu is a 82 y.o. male The patient's last visit with me was on 7/11/2022.   Chief Complaint:   Chief Complaint   Patient presents with    Medication Refill           History of Present Illness  Benign Prostatic Hypertrophy  Patient complains of lower urinary tract symptoms. He reports nocturia one time a night. He denies straining, urgency and weak stream. Patient states symptoms are of mild severity. Onset of symptoms was several years ago and was gradual in onset. He has no personal history and no family history of prostate cancer. He reports a history of no complicating symptoms. He denies flank pain, gross hematuria, kidney stones and recurrent UTI.  He is currently taking Flomax and Proscar.    IPSS Questionnaire (AUA-7):  Over the past month    1)  How often have you had a sensation of not emptying your bladder completely after you finish urinating?  3 - About half the time   2)  How often have you had to urinate again less than two hours after you finished urinating? 1 - Less than 1 time in 5   3)  How often have you found you stopped and started again several times when you urinated?  3 - About half the time   4) How difficult have you found it to postpone urination?  1 - Less than 1 time in 5   5) How often have you had a weak urinary stream?  2 - Less than half the time   6) How often have you had to push or strain to begin urination?  0 - Not at all   7) How many times did you most typically get up to urinate from the time you went to bed until the time you got up in the morning?  1 - 1 time   Total score:  0-7 mildly symptomatic    8-19 moderately symptomatic   11/1    20-35 severely symptomatic        Kidney Stones  He had a left proximal ureteral stone that was removed in August 2017.  His stent was removed in September.  He is back with an ultrasound.  He denies flank pain or hematuria or fever.        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    Hypertensive chronic kidney disease with stage 1 through stage 4 chronic kidney disease, or unspecified chronic kidney disease    Gastro-esophageal reflux disease without esophagitis    Chronic obstructive pulmonary disease, unspecified    Chronic low back pain    Carpal tunnel syndrome, bilateral upper limbs    Coronary artery disease involving native coronary artery of native heart without angina pectoris    PVC (premature ventricular contraction)    Statin intolerance    S/P laparoscopic cholecystectomy    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  Review of patient's allergies indicates:   Allergen Reactions    Lipitor [atorvastatin]      Pain/cramping    Pcn [penicillins] Hives    Pravastatin      Pain/cramping    Simvastatin     Statins-hmg-coa reductase inhibitors      Current Outpatient Medications   Medication Sig    allopurinoL (ZYLOPRIM) 100 MG tablet Take 1 tablet (100 mg total) by mouth every morning.    amLODIPine (NORVASC) 5 MG tablet Take 5 mg by mouth every morning.     aspirin (ECOTRIN) 81 MG EC tablet Take 1 tablet (81 mg total) by mouth once daily. (Patient taking differently: Take 81 mg by mouth every evening.)    cholecalciferol, vitamin D3, (VITAMIN D3) 50 mcg (2,000 unit) Tab Take 1 tablet (2,000 Units total) by mouth once daily.    dicyclomine (BENTYL) 10 MG capsule Take 10 mg by mouth 2 (two) times daily as needed.     esomeprazole (NEXIUM) 20 MG capsule TAKE 1 CAPSULE EVERY DAY  BEFORE BREAKFAST.    evolocumab (REPATHA SURECLICK) 140 mg/mL PnIj Inject 1 mL into the skin.    hydroCHLOROthiazide (HYDRODIURIL) 25 MG tablet TAKE 1 TABLET EVERY MORNING    finasteride (PROSCAR) 5 mg tablet Take 1 tablet (5 mg total) by mouth every evening.    tamsulosin (FLOMAX) 0.4 mg Cap TAKE 1 CAPSULE ONE TIME DAILY IN THE EVENING     No current facility-administered medications for this visit.       Review of Systems   Constitutional:  Negative for activity change, fatigue, fever and unexpected weight change.   HENT:  Negative for congestion.    Eyes:  Negative for redness.   Respiratory:  Negative for chest tightness and shortness of breath.    Cardiovascular:  Negative for chest pain and leg swelling.   Gastrointestinal:  Negative for abdominal pain, constipation, diarrhea, nausea and vomiting.   Genitourinary:  Negative for dysuria, flank pain, frequency, hematuria, penile pain, penile swelling, scrotal swelling, testicular pain and urgency.   Musculoskeletal:  Negative for arthralgias and back pain.   Neurological:  Negative for dizziness and light-headedness.   Psychiatric/Behavioral:  Negative for behavioral problems and confusion. The patient is not nervous/anxious.    All other systems reviewed and are negative.    Objective:      Vitals:    11/21/22 1358   Weight: 83.4 kg (183 lb 13.8 oz)       Physical Exam  Vitals and nursing note reviewed.   Constitutional:       Appearance: He is well-developed.   HENT:      Head: Normocephalic.   Eyes:      Conjunctiva/sclera: Conjunctivae normal.   Neck:      Thyroid: No thyromegaly.      Trachea: No tracheal deviation.   Cardiovascular:      Rate and Rhythm: Normal rate.      Heart sounds: Normal heart sounds.   Pulmonary:      Effort: Pulmonary effort is normal. No respiratory distress.      Breath sounds: Normal breath sounds. No wheezing.   Abdominal:      General: There is no distension.       Palpations: Abdomen is soft. There is no mass.      Tenderness: There is no abdominal tenderness. There is no guarding or rebound.      Hernia: No hernia is present. There is no hernia in the right inguinal area or left inguinal area.   Genitourinary:     Penis: Normal.       Testes: Normal.         Right: Mass or tenderness not present.         Left: Mass or tenderness not present.      Prostate: Enlarged. Not tender.      Rectum: Normal. No mass, tenderness or external hemorrhoid.      Comments: 40 gm smooth  Musculoskeletal:         General: No tenderness.      Cervical back: Normal range of motion.   Lymphadenopathy:      Lower Body: No right inguinal adenopathy. No left inguinal adenopathy.   Skin:     General: Skin is warm and dry.      Findings: No erythema or rash.   Neurological:      Mental Status: He is alert and oriented to person, place, and time.   Psychiatric:         Behavior: Behavior normal.         Thought Content: Thought content normal.         Judgment: Judgment normal.       Urine dipstick shows negative for all components.  Micro exam: negative for WBC's or RBC's.      7/5/2022  PSA 0.54 (1.08)    Component Ref Range & Units 10 d ago    PROSTATE SPECIFIC ANTIGEN, SCR - [facility] < OR = 4.00 ng/mL 0.56    Comment: The total PSA value from this assay system is   standardized against the WHO standard. The test   result will be approximately 20% lower when compared   to the equimolar-standardized total PSA (Alexander   Carlos). Comparison of serial PSA results should be   interpreted with this fact in mind.       This test was performed using the Siemens   chemiluminescent method. Values obtained from   different assay methods cannot be used   interchangeably. PSA levels, regardless of   value, should not be interpreted as absolute   evidence of the presence or absence of disease.    Resulting Agency  [facility]           Narrative  Performed by: [facility]  FASTING:YES      FASTING: YES      Specimen Collected: 11/11/22 08:04 Last Resulted: 11/12/22 05:59             Assessment:       1. BPH without urinary obstruction    2. Nocturia more than twice per night    3. Incomplete emptying of bladder            Plan:       1. BPH without urinary obstruction  Doing well  - tamsulosin (FLOMAX) 0.4 mg Cap; TAKE 1 CAPSULE ONE TIME DAILY IN THE EVENING  Dispense: 90 capsule; Refill: 3  - finasteride (PROSCAR) 5 mg tablet; Take 1 tablet (5 mg total) by mouth every evening.  Dispense: 90 tablet; Refill: 3    2. Nocturia more than twice per night  Limit evening fluids    3. Incomplete emptying of bladder  stable               Follow up in about 1 year (around 11/21/2023).

## 2023-02-27 ENCOUNTER — OFFICE VISIT (OUTPATIENT)
Dept: INTERNAL MEDICINE | Facility: CLINIC | Age: 83
End: 2023-02-27
Payer: MEDICARE

## 2023-02-27 VITALS
HEART RATE: 54 BPM | WEIGHT: 186.63 LBS | HEIGHT: 67 IN | BODY MASS INDEX: 29.29 KG/M2 | TEMPERATURE: 98 F | DIASTOLIC BLOOD PRESSURE: 68 MMHG | SYSTOLIC BLOOD PRESSURE: 118 MMHG

## 2023-02-27 DIAGNOSIS — Z78.9 STATIN INTOLERANCE: ICD-10-CM

## 2023-02-27 DIAGNOSIS — N40.1 BENIGN PROSTATIC HYPERPLASIA WITH URINARY OBSTRUCTION: ICD-10-CM

## 2023-02-27 DIAGNOSIS — E78.2 MIXED HYPERLIPIDEMIA: ICD-10-CM

## 2023-02-27 DIAGNOSIS — M15.9 POLYARTICULAR OSTEOARTHRITIS: ICD-10-CM

## 2023-02-27 DIAGNOSIS — K21.9 GASTRO-ESOPHAGEAL REFLUX DISEASE WITHOUT ESOPHAGITIS: ICD-10-CM

## 2023-02-27 DIAGNOSIS — R73.03 PREDIABETES: ICD-10-CM

## 2023-02-27 DIAGNOSIS — Z74.09 OTHER REDUCED MOBILITY: ICD-10-CM

## 2023-02-27 DIAGNOSIS — F52.21 ED (ERECTILE DYSFUNCTION) OF NON-ORGANIC ORIGIN: ICD-10-CM

## 2023-02-27 DIAGNOSIS — I49.3 PVC (PREMATURE VENTRICULAR CONTRACTION): ICD-10-CM

## 2023-02-27 DIAGNOSIS — Z90.49 S/P LAPAROSCOPIC CHOLECYSTECTOMY: ICD-10-CM

## 2023-02-27 DIAGNOSIS — I25.10 CORONARY ARTERY DISEASE INVOLVING NATIVE CORONARY ARTERY OF NATIVE HEART WITHOUT ANGINA PECTORIS: ICD-10-CM

## 2023-02-27 DIAGNOSIS — M54.50 CHRONIC LOW BACK PAIN, UNSPECIFIED BACK PAIN LATERALITY, UNSPECIFIED WHETHER SCIATICA PRESENT: ICD-10-CM

## 2023-02-27 DIAGNOSIS — Z87.442 PERSONAL HISTORY OF URINARY CALCULI: ICD-10-CM

## 2023-02-27 DIAGNOSIS — I65.23 OCCLUSION AND STENOSIS OF BILATERAL CAROTID ARTERIES: ICD-10-CM

## 2023-02-27 DIAGNOSIS — G89.29 CHRONIC LOW BACK PAIN, UNSPECIFIED BACK PAIN LATERALITY, UNSPECIFIED WHETHER SCIATICA PRESENT: ICD-10-CM

## 2023-02-27 DIAGNOSIS — E79.0 HYPERURICEMIA WITHOUT SIGNS OF INFLAMMATORY ARTHRITIS AND TOPHACEOUS DISEASE: ICD-10-CM

## 2023-02-27 DIAGNOSIS — N13.8 BENIGN PROSTATIC HYPERPLASIA WITH URINARY OBSTRUCTION: ICD-10-CM

## 2023-02-27 DIAGNOSIS — I12.9 HYPERTENSIVE CHRONIC KIDNEY DISEASE WITH STAGE 1 THROUGH STAGE 4 CHRONIC KIDNEY DISEASE, OR UNSPECIFIED CHRONIC KIDNEY DISEASE: Primary | ICD-10-CM

## 2023-02-27 DIAGNOSIS — I73.9 PAD (PERIPHERAL ARTERY DISEASE): ICD-10-CM

## 2023-02-27 DIAGNOSIS — J44.9 CHRONIC OBSTRUCTIVE PULMONARY DISEASE, UNSPECIFIED COPD TYPE: ICD-10-CM

## 2023-02-27 DIAGNOSIS — G56.03 CARPAL TUNNEL SYNDROME, BILATERAL UPPER LIMBS: ICD-10-CM

## 2023-02-27 DIAGNOSIS — N18.2 CHRONIC KIDNEY DISEASE, STAGE 2 (MILD): ICD-10-CM

## 2023-02-27 PROCEDURE — 1126F AMNT PAIN NOTED NONE PRSNT: CPT | Mod: CPTII,S$GLB,, | Performed by: INTERNAL MEDICINE

## 2023-02-27 PROCEDURE — 3288F FALL RISK ASSESSMENT DOCD: CPT | Mod: CPTII,S$GLB,, | Performed by: INTERNAL MEDICINE

## 2023-02-27 PROCEDURE — 96160 PT-FOCUSED HLTH RISK ASSMT: CPT | Mod: S$GLB,,, | Performed by: INTERNAL MEDICINE

## 2023-02-27 PROCEDURE — 3078F DIAST BP <80 MM HG: CPT | Mod: CPTII,S$GLB,, | Performed by: INTERNAL MEDICINE

## 2023-02-27 PROCEDURE — 99214 OFFICE O/P EST MOD 30 MIN: CPT | Mod: 25,S$GLB,, | Performed by: INTERNAL MEDICINE

## 2023-02-27 PROCEDURE — 3074F SYST BP LT 130 MM HG: CPT | Mod: CPTII,S$GLB,, | Performed by: INTERNAL MEDICINE

## 2023-02-27 PROCEDURE — 1101F PR PT FALLS ASSESS DOC 0-1 FALLS W/OUT INJ PAST YR: ICD-10-PCS | Mod: CPTII,S$GLB,, | Performed by: INTERNAL MEDICINE

## 2023-02-27 PROCEDURE — 96160 PR PT FOCUSED HLTH RISK ASSMT: ICD-10-PCS | Mod: S$GLB,,, | Performed by: INTERNAL MEDICINE

## 2023-02-27 PROCEDURE — 1157F ADVNC CARE PLAN IN RCRD: CPT | Mod: CPTII,S$GLB,, | Performed by: INTERNAL MEDICINE

## 2023-02-27 PROCEDURE — 99214 PR OFFICE/OUTPT VISIT, EST, LEVL IV, 30-39 MIN: ICD-10-PCS | Mod: 25,S$GLB,, | Performed by: INTERNAL MEDICINE

## 2023-02-27 PROCEDURE — 3078F PR MOST RECENT DIASTOLIC BLOOD PRESSURE < 80 MM HG: ICD-10-PCS | Mod: CPTII,S$GLB,, | Performed by: INTERNAL MEDICINE

## 2023-02-27 PROCEDURE — 1160F RVW MEDS BY RX/DR IN RCRD: CPT | Mod: CPTII,S$GLB,, | Performed by: INTERNAL MEDICINE

## 2023-02-27 PROCEDURE — 1157F PR ADVANCE CARE PLAN OR EQUIV PRESENT IN MEDICAL RECORD: ICD-10-PCS | Mod: CPTII,S$GLB,, | Performed by: INTERNAL MEDICINE

## 2023-02-27 PROCEDURE — 3288F PR FALLS RISK ASSESSMENT DOCUMENTED: ICD-10-PCS | Mod: CPTII,S$GLB,, | Performed by: INTERNAL MEDICINE

## 2023-02-27 PROCEDURE — 1101F PT FALLS ASSESS-DOCD LE1/YR: CPT | Mod: CPTII,S$GLB,, | Performed by: INTERNAL MEDICINE

## 2023-02-27 PROCEDURE — 1160F PR REVIEW ALL MEDS BY PRESCRIBER/CLIN PHARMACIST DOCUMENTED: ICD-10-PCS | Mod: CPTII,S$GLB,, | Performed by: INTERNAL MEDICINE

## 2023-02-27 PROCEDURE — 1159F MED LIST DOCD IN RCRD: CPT | Mod: CPTII,S$GLB,, | Performed by: INTERNAL MEDICINE

## 2023-02-27 PROCEDURE — 1159F PR MEDICATION LIST DOCUMENTED IN MEDICAL RECORD: ICD-10-PCS | Mod: CPTII,S$GLB,, | Performed by: INTERNAL MEDICINE

## 2023-02-27 PROCEDURE — 3074F PR MOST RECENT SYSTOLIC BLOOD PRESSURE < 130 MM HG: ICD-10-PCS | Mod: CPTII,S$GLB,, | Performed by: INTERNAL MEDICINE

## 2023-02-27 PROCEDURE — 1126F PR PAIN SEVERITY QUANTIFIED, NO PAIN PRESENT: ICD-10-PCS | Mod: CPTII,S$GLB,, | Performed by: INTERNAL MEDICINE

## 2023-02-27 NOTE — PROGRESS NOTES
Chief C/o:    Chronic Kidney Disease, Hyperlipidemia, Pre-diabetes, and Benign Prostatic Hypertrophy        Health Care Maintenance    Health Maintenance         Date Due Completion Date    Colonoscopy 07/22/2023 7/22/2020    Override on 2/2/2018: Done    Override on 9/9/2013: Done    Hemoglobin A1c (Prediabetes) 11/11/2023 11/11/2022    Aspirin/Antiplatelet Therapy 02/27/2024 2/27/2023    TETANUS VACCINE 04/25/2024 4/25/2014    Lipid Panel 11/11/2027 11/11/2022                   HISTORY OF PRESENT ILLNESS:    JUDE Gu is a 82 y.o. male who presents to the clinic today for Chronic Kidney Disease, Hyperlipidemia, Pre-diabetes, and Benign Prostatic Hypertrophy  . Patient is having no chest pain, no shortness of breath, no fever no chills.  Patient is having no side effects related to current medications.                  ALLERGIES AND MEDICATIONS: updated and reviewed.  Review of patient's allergies indicates:   Allergen Reactions    Lipitor [atorvastatin]      Pain/cramping    Pcn [penicillins] Hives    Pravastatin      Pain/cramping    Simvastatin     Statins-hmg-coa reductase inhibitors      Medication List with Changes/Refills   Current Medications    ALLOPURINOL (ZYLOPRIM) 100 MG TABLET    Take 1 tablet (100 mg total) by mouth every morning.    AMLODIPINE (NORVASC) 5 MG TABLET    Take 5 mg by mouth every morning.     ASPIRIN (ECOTRIN) 81 MG EC TABLET    Take 1 tablet (81 mg total) by mouth once daily.    CHOLECALCIFEROL, VITAMIN D3, (VITAMIN D3) 50 MCG (2,000 UNIT) TAB    Take 1 tablet (2,000 Units total) by mouth once daily.    DICYCLOMINE (BENTYL) 10 MG CAPSULE    Take 10 mg by mouth 2 (two) times daily as needed.    ESOMEPRAZOLE (NEXIUM) 20 MG CAPSULE    TAKE 1 CAPSULE EVERY DAY  BEFORE BREAKFAST.    EVOLOCUMAB (REPATHA SURECLICK) 140 MG/ML PNIJ    Inject 1 mL into the skin.    FINASTERIDE (PROSCAR) 5 MG TABLET    Take 1 tablet (5 mg total) by mouth every evening.    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    Hypertensive chronic kidney disease with stage 1 through stage 4 chronic kidney disease, or unspecified chronic kidney disease    Gastro-esophageal reflux disease without esophagitis    Chronic obstructive pulmonary disease, unspecified    Chronic low back pain    Carpal tunnel syndrome, bilateral upper limbs    Coronary artery disease involving native coronary artery of native heart without angina pectoris    PVC (premature ventricular contraction)    Statin intolerance    S/P laparoscopic cholecystectomy    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    ED (erectile dysfunction) of non-organic origin    BMI 29.0-29.9,adult    History of COVID-19         CARE TEAM:    Patient Care Team:  Paresh Escobedo MD as PCP - General (Internal Medicine)  Belinda Rinaldi LPN as Licensed Practical Nurse  Edy Varela MD (Cardiology)  Robert Mendieta MD (Inactive) (Pain Medicine)  Dami Noonan II, MD (Inactive) as Consulting Physician (Gastroenterology)  Gama Martinez MD as Consulting Physician (Urology)  Duane Christy MD as Consulting Physician (Ophthalmology)         REVIEW OF SYSTEMS:    Review of Systems   Constitutional:  Negative for appetite change, chills, diaphoresis, fatigue, fever and unexpected weight change.   HENT:  Negative for congestion, drooling, ear discharge,  "ear pain, facial swelling, hearing loss, nosebleeds, postnasal drip, rhinorrhea, sinus pain, sneezing, sore throat, tinnitus, trouble swallowing and voice change.    Eyes:  Negative for pain, discharge, redness, itching and visual disturbance.   Respiratory:  Negative for cough, choking, chest tightness, shortness of breath, wheezing and stridor.    Cardiovascular:  Negative for chest pain, palpitations and leg swelling.   Gastrointestinal:  Negative for abdominal distention, abdominal pain, blood in stool, constipation, diarrhea, nausea and vomiting.   Endocrine: Negative for cold intolerance, heat intolerance, polydipsia, polyphagia and polyuria.   Genitourinary:  Negative for difficulty urinating, dysuria, flank pain, frequency, hematuria and urgency.   Musculoskeletal:  Positive for arthralgias. Negative for back pain, gait problem, joint swelling, myalgias, neck pain and neck stiffness.   Skin:  Negative for color change, pallor, rash and wound.   Allergic/Immunologic: Negative for environmental allergies, food allergies and immunocompromised state.   Neurological:  Negative for dizziness, tremors, seizures, syncope, speech difficulty, weakness, light-headedness, numbness and headaches.   Hematological:  Negative for adenopathy. Does not bruise/bleed easily.   Psychiatric/Behavioral:  Negative for agitation, behavioral problems, confusion, decreased concentration, dysphoric mood, hallucinations, sleep disturbance and suicidal ideas. The patient is not nervous/anxious.        PHYSICAL EXAM:    Vitals:    02/27/23 0856   BP: 118/68   Pulse: (!) 54   Temp: 97.5 °F (36.4 °C)     Weight: 84.6 kg (186 lb 9.9 oz)   Height: 5' 7.01" (170.2 cm)   Body mass index is 29.22 kg/m².  Vitals:    02/27/23 0856   BP: 118/68   Pulse: (!) 54   Temp: 97.5 °F (36.4 °C)   TempSrc: Temporal   Weight: 84.6 kg (186 lb 9.9 oz)   Height: 5' 7.01" (1.702 m)   PainSc: 0-No pain          Physical Exam  Vitals and nursing note reviewed. "   Constitutional:       General: He is not in acute distress.     Appearance: He is not ill-appearing, toxic-appearing or diaphoretic.      Comments: Patient is overweight.    Patient is alert, awake and oriented X 3.  Patient is comfortable, cooperative and in no apparent distress.     HENT:      Head: Normocephalic and atraumatic.      Right Ear: Tympanic membrane, ear canal and external ear normal. There is no impacted cerumen.      Left Ear: Tympanic membrane, ear canal and external ear normal. There is no impacted cerumen.      Nose: Nose normal. No congestion or rhinorrhea.      Mouth/Throat:      Mouth: Mucous membranes are moist.      Pharynx: Oropharynx is clear. No oropharyngeal exudate or posterior oropharyngeal erythema.   Eyes:      General: No scleral icterus.        Right eye: No discharge.         Left eye: No discharge.      Extraocular Movements: Extraocular movements intact.      Conjunctiva/sclera: Conjunctivae normal.      Pupils: Pupils are equal, round, and reactive to light.   Cardiovascular:      Rate and Rhythm: Regular rhythm. Bradycardia present.      Pulses: Normal pulses.      Heart sounds: Murmur heard.     No friction rub. No gallop.   Pulmonary:      Effort: Pulmonary effort is normal. No respiratory distress.      Breath sounds: Normal breath sounds. No stridor. No wheezing, rhonchi or rales.   Chest:      Chest wall: No tenderness.   Abdominal:      General: Bowel sounds are normal. There is no distension.      Palpations: Abdomen is soft. There is no mass.      Tenderness: There is no abdominal tenderness. There is no guarding or rebound.      Hernia: No hernia is present.   Musculoskeletal:         General: No swelling, tenderness, deformity or signs of injury. Normal range of motion.      Cervical back: Normal range of motion and neck supple. No rigidity.      Right lower leg: No edema.      Left lower leg: No edema.      Comments: Crepitations knees.   Lymphadenopathy:       Cervical: No cervical adenopathy.   Skin:     General: Skin is warm and dry.      Capillary Refill: Capillary refill takes less than 2 seconds.      Coloration: Skin is not jaundiced or pale.      Findings: No bruising, erythema, lesion or rash.   Neurological:      General: No focal deficit present.      Mental Status: He is alert and oriented to person, place, and time.      Cranial Nerves: No cranial nerve deficit.      Sensory: No sensory deficit.      Motor: No weakness.      Coordination: Coordination normal.      Deep Tendon Reflexes: Reflexes normal.   Psychiatric:         Mood and Affect: Mood normal.         Behavior: Behavior normal.         Thought Content: Thought content normal.         Judgment: Judgment normal.          Labs:    Lab Results   Component Value Date    GLU 97 11/11/2022     11/11/2022    K 4.6 11/11/2022     11/11/2022    CO2 31 11/11/2022    BUN 17 11/11/2022    CREATININE 0.99 11/11/2022    CALCIUM 9.7 11/11/2022    PROT 6.8 11/11/2022    ALBUMIN 4.1 11/11/2022    BILITOT 0.9 11/11/2022    AST 17 11/11/2022    ALT 12 11/11/2022    ESTGFRAFRICA 70 05/11/2022    EGFRNONAA 60 05/11/2022     Lab Results   Component Value Date    WBC 7.9 05/11/2022    RBC 4.34 05/11/2022    HGB 13.3 05/11/2022    HCT 40.9 05/11/2022    MCV 94.2 05/11/2022    RDW 12.6 05/11/2022     05/11/2022      Lab Results   Component Value Date    CHOL 145 11/11/2022    TRIG 41 11/11/2022    TRIG 40 09/05/2019    HDL 72 11/11/2022    HDL 58 09/05/2019    LDLCALC 61 11/11/2022     Lab Results   Component Value Date    TSH 1.90 05/11/2022     Lab Results   Component Value Date    HGBA1C 5.2 11/11/2022    HGBA1C 5.5 09/05/2019      No components found for: MICROALBUMIN/CREATININE    ASSESSMENT & PLAN:    1. Hypertensive chronic kidney disease with stage 1 through stage 4 chronic kidney disease, or unspecified chronic kidney disease  - CBC Auto Differential; Future  - Comprehensive Metabolic Panel;  Future  - CBC Auto Differential  - Comprehensive Metabolic Panel  The current medical regimen is effective;  continue present plan and medications.  2. Mixed hyperlipidemia  - Lipid Panel; Future  - Lipid Panel  The current medical regimen is effective;  continue present plan and medications.  3. PAD (peripheral artery disease)  - CBC Auto Differential; Future  - CBC Auto Differential  Continue antiplatelets-anticoagulation and statin to decrease chances of progression, in addition to diet, exercise, and weight management.  4. Occlusion and stenosis of bilateral carotid arteries  Continue antiplatelets-anticoagulation and statin to decrease chances of progression, in addition to diet, exercise, and weight management.  5. Coronary artery disease involving native coronary artery of native heart without angina pectoris  - Comprehensive Metabolic Panel; Future  - Comprehensive Metabolic Panel  Continue antiplatelets-anticoagulation and statin to decrease chances of progression, in addition to diet, exercise, and weight management.  6. PVC (premature ventricular contraction)  In sinus rhythm currently  7. Chronic obstructive pulmonary disease, unspecified COPD type  Asymptomatic currently  8. Chronic kidney disease, stage 2 (mild)  Mild and stable, will keep monitoring.  9. Benign prostatic hyperplasia with urinary obstruction  10. Personal history of urinary calculi  11. Gastro-esophageal reflux disease without esophagitis  Asymptomatic  12. Prediabetes  - CBC Auto Differential; Future  - Hemoglobin A1C; Future  - CBC Auto Differential  - Hemoglobin A1C  Blood sugar is in the range of prediabetes.  In simple words, blood sugar is elevated more than the upper limit of normal, and less than that of Diabetes Mellitus.   Healthy diet, exercise and weight management are essential, to prevent or decrease chances of progression to f clinical Diabetes Mellitus.  13. BMI 29.0-29.9,adult  Healthy diet, exercise, and weight  monitoring are essential for weight management.    Weight loss was discussed.  Ultimate goal is BMI below 25.   14. ED (erectile dysfunction) of non-organic origin    15. Polyarticular osteoarthritis    16. 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 Chronic low back pain, unspecified back pain laterality, unspecified whether sciatica present    18. Other reduced mobility  - CBC Auto Differential; Future  - CBC Auto Differential    19. Statin intolerance    20. Carpal tunnel syndrome, bilateral upper limbs    21. S/P laparoscopic cholecystectomy             Orders Placed This Encounter   Procedures    CBC Auto Differential    Comprehensive Metabolic Panel    Lipid Panel    Hemoglobin A1C      Follow up in about 3 months (around 5/27/2023), or if symptoms worsen or fail to improve. or sooner as needed.    Patient was counseled and questions and concerns were addressed.    Please note:  Parts of this report were done using a dictation software, voice to text, and sometimes the text contains some uncorrected words or sentences that are missed during revision.

## 2023-05-25 LAB
ALBUMIN SERPL-MCNC: 4.1 G/DL (ref 3.6–5.1)
ALBUMIN/GLOB SERPL: 1.7 (CALC) (ref 1–2.5)
ALP SERPL-CCNC: 65 U/L (ref 35–144)
ALT SERPL-CCNC: 10 U/L (ref 9–46)
AST SERPL-CCNC: 18 U/L (ref 10–35)
BASOPHILS # BLD AUTO: 30 CELLS/UL (ref 0–200)
BASOPHILS NFR BLD AUTO: 0.6 %
BILIRUB SERPL-MCNC: 0.8 MG/DL (ref 0.2–1.2)
BUN SERPL-MCNC: 21 MG/DL (ref 7–25)
BUN/CREAT SERPL: 17 (CALC) (ref 6–22)
CALCIUM SERPL-MCNC: 9.1 MG/DL (ref 8.6–10.3)
CHLORIDE SERPL-SCNC: 104 MMOL/L (ref 98–110)
CHOLEST SERPL-MCNC: 131 MG/DL
CHOLEST/HDLC SERPL: 2 (CALC)
CO2 SERPL-SCNC: 29 MMOL/L (ref 20–32)
CREAT SERPL-MCNC: 1.27 MG/DL (ref 0.7–1.22)
EGFR: 56 ML/MIN/1.73M2
EOSINOPHIL # BLD AUTO: 170 CELLS/UL (ref 15–500)
EOSINOPHIL NFR BLD AUTO: 3.4 %
ERYTHROCYTE [DISTWIDTH] IN BLOOD BY AUTOMATED COUNT: 13 % (ref 11–15)
GLOBULIN SER CALC-MCNC: 2.4 G/DL (CALC) (ref 1.9–3.7)
GLUCOSE SERPL-MCNC: 90 MG/DL (ref 65–99)
HBA1C MFR BLD: 5.2 % OF TOTAL HGB
HCT VFR BLD AUTO: 40.7 % (ref 38.5–50)
HDLC SERPL-MCNC: 64 MG/DL
HGB BLD-MCNC: 14.1 G/DL (ref 13.2–17.1)
LDLC SERPL CALC-MCNC: 56 MG/DL (CALC)
LYMPHOCYTES # BLD AUTO: 2030 CELLS/UL (ref 850–3900)
LYMPHOCYTES NFR BLD AUTO: 40.6 %
MCH RBC QN AUTO: 31.5 PG (ref 27–33)
MCHC RBC AUTO-ENTMCNC: 34.6 G/DL (ref 32–36)
MCV RBC AUTO: 91.1 FL (ref 80–100)
MONOCYTES # BLD AUTO: 525 CELLS/UL (ref 200–950)
MONOCYTES NFR BLD AUTO: 10.5 %
NEUTROPHILS # BLD AUTO: 2245 CELLS/UL (ref 1500–7800)
NEUTROPHILS NFR BLD AUTO: 44.9 %
NONHDLC SERPL-MCNC: 67 MG/DL (CALC)
PLATELET # BLD AUTO: 151 THOUSAND/UL (ref 140–400)
PMV BLD REES-ECKER: 9.2 FL (ref 7.5–12.5)
POTASSIUM SERPL-SCNC: 4 MMOL/L (ref 3.5–5.3)
PROT SERPL-MCNC: 6.5 G/DL (ref 6.1–8.1)
RBC # BLD AUTO: 4.47 MILLION/UL (ref 4.2–5.8)
SODIUM SERPL-SCNC: 142 MMOL/L (ref 135–146)
TRIGL SERPL-MCNC: 40 MG/DL
WBC # BLD AUTO: 5 THOUSAND/UL (ref 3.8–10.8)

## 2023-05-30 ENCOUNTER — OFFICE VISIT (OUTPATIENT)
Dept: INTERNAL MEDICINE | Facility: CLINIC | Age: 83
End: 2023-05-30
Payer: MEDICARE

## 2023-05-30 VITALS
DIASTOLIC BLOOD PRESSURE: 61 MMHG | SYSTOLIC BLOOD PRESSURE: 103 MMHG | HEIGHT: 67 IN | BODY MASS INDEX: 28.84 KG/M2 | HEART RATE: 58 BPM | WEIGHT: 183.75 LBS | TEMPERATURE: 98 F

## 2023-05-30 DIAGNOSIS — Z78.9 STATIN INTOLERANCE: ICD-10-CM

## 2023-05-30 DIAGNOSIS — F52.21 ED (ERECTILE DYSFUNCTION) OF NON-ORGANIC ORIGIN: ICD-10-CM

## 2023-05-30 DIAGNOSIS — I49.3 PVC (PREMATURE VENTRICULAR CONTRACTION): ICD-10-CM

## 2023-05-30 DIAGNOSIS — R73.03 PREDIABETES: ICD-10-CM

## 2023-05-30 DIAGNOSIS — Z00.00 ROUTINE GENERAL MEDICAL EXAMINATION AT A HEALTH CARE FACILITY: Primary | ICD-10-CM

## 2023-05-30 DIAGNOSIS — I12.9 HYPERTENSIVE CHRONIC KIDNEY DISEASE WITH STAGE 1 THROUGH STAGE 4 CHRONIC KIDNEY DISEASE, OR UNSPECIFIED CHRONIC KIDNEY DISEASE: ICD-10-CM

## 2023-05-30 DIAGNOSIS — N13.8 BENIGN PROSTATIC HYPERPLASIA WITH URINARY OBSTRUCTION: ICD-10-CM

## 2023-05-30 DIAGNOSIS — K21.9 GASTRO-ESOPHAGEAL REFLUX DISEASE WITHOUT ESOPHAGITIS: ICD-10-CM

## 2023-05-30 DIAGNOSIS — I25.10 CORONARY ARTERY DISEASE INVOLVING NATIVE CORONARY ARTERY OF NATIVE HEART WITHOUT ANGINA PECTORIS: ICD-10-CM

## 2023-05-30 DIAGNOSIS — Z87.442 PERSONAL HISTORY OF URINARY CALCULI: ICD-10-CM

## 2023-05-30 DIAGNOSIS — I65.23 OCCLUSION AND STENOSIS OF BILATERAL CAROTID ARTERIES: ICD-10-CM

## 2023-05-30 DIAGNOSIS — Z74.09 OTHER REDUCED MOBILITY: ICD-10-CM

## 2023-05-30 DIAGNOSIS — E78.2 MIXED HYPERLIPIDEMIA: ICD-10-CM

## 2023-05-30 DIAGNOSIS — N40.1 BENIGN PROSTATIC HYPERPLASIA WITH URINARY OBSTRUCTION: ICD-10-CM

## 2023-05-30 DIAGNOSIS — I73.9 PAD (PERIPHERAL ARTERY DISEASE): ICD-10-CM

## 2023-05-30 DIAGNOSIS — N18.31 STAGE 3A CHRONIC KIDNEY DISEASE: ICD-10-CM

## 2023-05-30 DIAGNOSIS — J44.9 CHRONIC OBSTRUCTIVE PULMONARY DISEASE, UNSPECIFIED COPD TYPE: ICD-10-CM

## 2023-05-30 PROCEDURE — 99497 PR ADVNCD CARE PLAN 30 MIN: ICD-10-PCS | Mod: S$GLB,,, | Performed by: INTERNAL MEDICINE

## 2023-05-30 PROCEDURE — 1126F PR PAIN SEVERITY QUANTIFIED, NO PAIN PRESENT: ICD-10-PCS | Mod: CPTII,S$GLB,, | Performed by: INTERNAL MEDICINE

## 2023-05-30 PROCEDURE — 3074F SYST BP LT 130 MM HG: CPT | Mod: CPTII,S$GLB,, | Performed by: INTERNAL MEDICINE

## 2023-05-30 PROCEDURE — 3078F PR MOST RECENT DIASTOLIC BLOOD PRESSURE < 80 MM HG: ICD-10-PCS | Mod: CPTII,S$GLB,, | Performed by: INTERNAL MEDICINE

## 2023-05-30 PROCEDURE — 3074F PR MOST RECENT SYSTOLIC BLOOD PRESSURE < 130 MM HG: ICD-10-PCS | Mod: CPTII,S$GLB,, | Performed by: INTERNAL MEDICINE

## 2023-05-30 PROCEDURE — 99213 PR OFFICE/OUTPT VISIT, EST, LEVL III, 20-29 MIN: ICD-10-PCS | Mod: 25,S$GLB,, | Performed by: INTERNAL MEDICINE

## 2023-05-30 PROCEDURE — 99213 OFFICE O/P EST LOW 20 MIN: CPT | Mod: 25,S$GLB,, | Performed by: INTERNAL MEDICINE

## 2023-05-30 PROCEDURE — 1123F ACP DISCUSS/DSCN MKR DOCD: CPT | Mod: CPTII,S$GLB,, | Performed by: INTERNAL MEDICINE

## 2023-05-30 PROCEDURE — 1123F PR ADV CARE PLAN DISCUSSED, PLAN OR SURROGATE DOCUMENTED: ICD-10-PCS | Mod: CPTII,S$GLB,, | Performed by: INTERNAL MEDICINE

## 2023-05-30 PROCEDURE — 99497 ADVNCD CARE PLAN 30 MIN: CPT | Mod: S$GLB,,, | Performed by: INTERNAL MEDICINE

## 2023-05-30 PROCEDURE — 1126F AMNT PAIN NOTED NONE PRSNT: CPT | Mod: CPTII,S$GLB,, | Performed by: INTERNAL MEDICINE

## 2023-05-30 PROCEDURE — 1160F PR REVIEW ALL MEDS BY PRESCRIBER/CLIN PHARMACIST DOCUMENTED: ICD-10-PCS | Mod: CPTII,S$GLB,, | Performed by: INTERNAL MEDICINE

## 2023-05-30 PROCEDURE — 1159F MED LIST DOCD IN RCRD: CPT | Mod: CPTII,S$GLB,, | Performed by: INTERNAL MEDICINE

## 2023-05-30 PROCEDURE — 1160F RVW MEDS BY RX/DR IN RCRD: CPT | Mod: CPTII,S$GLB,, | Performed by: INTERNAL MEDICINE

## 2023-05-30 PROCEDURE — 1159F PR MEDICATION LIST DOCUMENTED IN MEDICAL RECORD: ICD-10-PCS | Mod: CPTII,S$GLB,, | Performed by: INTERNAL MEDICINE

## 2023-05-30 PROCEDURE — 3078F DIAST BP <80 MM HG: CPT | Mod: CPTII,S$GLB,, | Performed by: INTERNAL MEDICINE

## 2023-05-30 PROCEDURE — G0439 PR MEDICARE ANNUAL WELLNESS SUBSEQUENT VISIT: ICD-10-PCS | Mod: S$GLB,,, | Performed by: INTERNAL MEDICINE

## 2023-05-30 PROCEDURE — G0439 PPPS, SUBSEQ VISIT: HCPCS | Mod: S$GLB,,, | Performed by: INTERNAL MEDICINE

## 2023-05-30 RX ORDER — CHOLECALCIFEROL (VITAMIN D3) 50 MCG
2000 TABLET ORAL DAILY
Start: 2023-05-30

## 2023-05-30 NOTE — PROGRESS NOTES
Geronimo Brittanie presented for a follow-up Medicare AWV today. The following components were reviewed and updated:    Medical history  Family History  Social history  Allergies and Current Medications  Health Risk Assessment  Health Maintenance  Care Team    The following assessments were completed:  Depression Screening  Cognitive function Screening  Timed Get Up Test  Hearing screening    Annual wellness visit  was completed during this visit as well as Advance Care Planning.  Paper based questionnaire and assessment were completed and will be scanned to the media section of patient's EHR records..    Family History   Problem Relation Age of Onset    Heart disease Father     Lung disease Father     Heart disease Mother     Hypertension Mother     Diabetes Mother     Crohn's disease Sister     Diabetes Brother     Diabetes Brother      Social Determinants of Health     Tobacco Use: Medium Risk    Smoking Tobacco Use: Former    Smokeless Tobacco Use: Never    Passive Exposure: Not on file   Alcohol Use: Not on file   Financial Resource Strain: Not on file   Food Insecurity: Not on file   Transportation Needs: Not on file   Physical Activity: Not on file   Stress: Not on file   Social Connections: Not on file   Housing Stability: Not on file   Depression: Low Risk     Last PHQ Score: 0     Review of patient's allergies indicates:   Allergen Reactions    Lipitor [atorvastatin]      Pain/cramping    Pcn [penicillins] Hives    Pravastatin      Pain/cramping    Simvastatin     Statins-hmg-coa reductase inhibitors      Patient Active Problem List   Diagnosis    Mixed hyperlipidemia    Prediabetes    Stage 3a chronic kidney disease    Benign prostatic hyperplasia with urinary obstruction    Polyarticular osteoarthritis    Personal history of urinary calculi    PAD (peripheral artery disease)    Other reduced mobility    Occlusion and stenosis of bilateral carotid arteries    Hyperuricemia without signs of inflammatory  arthritis and tophaceous disease    Hypertensive chronic kidney disease with stage 1 through stage 4 chronic kidney disease, or unspecified chronic kidney disease    Gastro-esophageal reflux disease without esophagitis    Chronic obstructive pulmonary disease, unspecified    Chronic low back pain    Carpal tunnel syndrome, bilateral upper limbs    Coronary artery disease involving native coronary artery of native heart without angina pectoris    PVC (premature ventricular contraction)    Statin intolerance    S/P laparoscopic cholecystectomy    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    ED (erectile dysfunction) of non-organic origin    BMI 28.0-28.9,adult    History of COVID-19     Past Surgical History:   Procedure Laterality Date    CARDIAC CATHETERIZATION      May 2017    CAROTID ENDARTERECTOMY      CARPAL TUNNEL RELEASE Bilateral     each hand    cholecestectomy  08/10/2020    date provided by patient    CYSTOSCOPY      ROTATOR CUFF REPAIR Bilateral     rotator cuff surgery       Rt side x2, Lt side x1    TONSILLECTOMY       Current Outpatient Medications on File Prior to Visit   Medication Sig Dispense Refill    allopurinoL (ZYLOPRIM) 100 MG tablet Take 1 tablet (100 mg total) by mouth every morning. 90 tablet 3    amLODIPine (NORVASC) 5 MG tablet Take 5 mg by mouth every morning.       aspirin (ECOTRIN) 81 MG EC tablet Take 1 tablet (81 mg total) by mouth once daily. (Patient taking differently: Take 81 mg by mouth every evening.)      dicyclomine (BENTYL) 10 MG capsule Take 10 mg by mouth 2 (two) times daily as needed.      esomeprazole (NEXIUM) 20 MG capsule TAKE 1 CAPSULE EVERY DAY  BEFORE BREAKFAST. 90 capsule 2    evolocumab (REPATHA SURECLICK) 140 mg/mL PnIj Inject 1 mL into the skin.      finasteride (PROSCAR) 5 mg tablet Take 1 tablet (5 mg total) by mouth every evening. 90 tablet 3    hydroCHLOROthiazide (HYDRODIURIL) 25 MG tablet TAKE 1 TABLET EVERY MORNING      tamsulosin (FLOMAX) 0.4  mg Cap TAKE 1 CAPSULE ONE TIME DAILY IN THE EVENING 90 capsule 3    [DISCONTINUED] cholecalciferol, vitamin D3, (VITAMIN D3) 50 mcg (2,000 unit) Tab Take 1 tablet (2,000 Units total) by mouth once daily. (Patient not taking: Reported on 5/30/2023)       No current facility-administered medications on file prior to visit.     Review of Systems   Constitutional:  Negative for appetite change, chills, diaphoresis, fatigue, fever and unexpected weight change.   HENT:  Negative for congestion, drooling, ear discharge, ear pain, facial swelling, hearing loss, nosebleeds, postnasal drip, rhinorrhea, sinus pain, sneezing, sore throat, tinnitus, trouble swallowing and voice change.    Eyes:  Negative for pain, discharge, redness, itching and visual disturbance.   Respiratory:  Negative for cough, choking, chest tightness, shortness of breath, wheezing and stridor.    Cardiovascular:  Negative for chest pain, palpitations and leg swelling.   Gastrointestinal:  Negative for abdominal distention, abdominal pain, blood in stool, constipation, diarrhea, nausea and vomiting.   Endocrine: Negative for cold intolerance, heat intolerance, polydipsia, polyphagia and polyuria.   Genitourinary:  Negative for difficulty urinating, dysuria, flank pain, frequency, hematuria and urgency.   Musculoskeletal:  Positive for arthralgias. Negative for back pain, gait problem, joint swelling, myalgias, neck pain and neck stiffness.   Skin:  Negative for color change, pallor, rash and wound.   Allergic/Immunologic: Negative for environmental allergies, food allergies and immunocompromised state.   Neurological:  Negative for dizziness, tremors, seizures, syncope, speech difficulty, weakness, light-headedness, numbness and headaches.   Hematological:  Negative for adenopathy. Does not bruise/bleed easily.   Psychiatric/Behavioral:  Negative for agitation, behavioral problems, confusion, decreased concentration, dysphoric mood, hallucinations, sleep  "disturbance and suicidal ideas. The patient is not nervous/anxious.      Vitals:    05/30/23 0836   BP: 103/61   BP Location: Left arm   Patient Position: Sitting   Pulse: (!) 58   Temp: 97.9 °F (36.6 °C)   TempSrc: Temporal   Weight: 83.3 kg (183 lb 12.1 oz)   Height: 5' 7.01" (1.702 m)     Body mass index is 28.77 kg/m².   ]        Diagnoses and health risks identified today and associated recommendations/orders:    *. Routine general medical examination at a health care facility    1. Hypertensive chronic kidney disease with stage 1 through stage 4 chronic kidney disease, or unspecified chronic kidney disease  The current medical regimen is effective;  continue present plan and medications.  2. Mixed hyperlipidemia  The current medical regimen is effective;  continue present plan and medications.  3. Coronary artery disease involving native coronary artery of native heart without angina pectoris  Continue antiplatelets-anticoagulation and statin to decrease chances of progression, in addition to diet, exercise, and weight management.  4. Occlusion and stenosis of bilateral carotid arteries  Continue antiplatelets-anticoagulation and statin to decrease chances of progression, in addition to diet, exercise, and weight management.  5. PAD (peripheral artery disease)  Continue antiplatelets-anticoagulation and statin to decrease chances of progression, in addition to diet, exercise, and weight management.  6. PVC (premature ventricular contraction)   In sinus rhythm currently  7. Prediabetes  Blood sugar is in the range of prediabetes.  In simple words, blood sugar is elevated more than the upper limit of normal, and less than that of Diabetes Mellitus.   Healthy diet, exercise and weight management are essential, to prevent or decrease chances of progression to f clinical Diabetes Mellitus.  8. Gastro-esophageal reflux disease without esophagitis   Asymptomatic currently  9. Stage 3a chronic kidney disease   Stable, " seen by nephrologist recently  10. BMI 28.0-28.9,adult   Healthy diet, exercise, and weight monitoring are essential for weight management.    Weight loss was discussed.  Ultimate goal is BMI below 25.   11. Chronic obstructive pulmonary disease, unspecified COPD type   Doing well currently, on no medications, no recent exace  12. Benign prostatic hyperplasia with urinary obstruction  [place].The current medical regimen is effective;  continue present plan and medications.  13. ED (erectile dysfunction) of non-organic origin    14. Personal history of urinary calculi    15. Other reduced mobility    16. Statin intolerance      Provided Geronimo with a 5-10 year written screening schedule and personal prevention plan. Recommendations were developed using the USPSTF age appropriate recommendations. Education, counseling, and referrals were provided as needed.  After Visit Summary printed and given to patient which includes a list of additional screenings\tests needed.    Follow up in about 3 months (around 8/30/2023), or if symptoms worsen or fail to improve.      Paresh Escobedo MD

## 2023-05-30 NOTE — PROGRESS NOTES
Chief C/o:    Follow-up (3 mth follow up with lab results/), Hyperlipidemia, Pre-diabetes, and Chronic Kidney Disease        Health Care Maintenance    Health Maintenance         Date Due Completion Date    COVID-19 Vaccine (7 - Pfizer series) 09/20/2022 5/20/2022    Colonoscopy 07/22/2023 7/22/2020    Override on 2/2/2018: Done    Override on 9/9/2013: Done    Aspirin/Antiplatelet Therapy 02/27/2024 2/27/2023    TETANUS VACCINE 04/25/2024 4/25/2014    Hemoglobin A1c (Prediabetes) 05/24/2024 5/24/2023    Lipid Panel 05/24/2028 5/24/2023                   HISTORY OF PRESENT ILLNESS:    JUDE Gu is a 82 y.o. male who presents to the clinic today for Follow-up (3 mth follow up with lab results/), Hyperlipidemia, Pre-diabetes, and Chronic Kidney Disease  .   Patient is having no chest pain, no shortness of breath, no fever no chills.  Patient is having no side effects related to current medications.        Date Type Department Care Team Description   05/23/2023 Office Visit [facility], [facility]     Quincy Segundo MD     Stage 3a chronic kidney disease (HCC) (Primary Dx)           ALLERGIES AND MEDICATIONS: updated and reviewed.  Review of patient's allergies indicates:   Allergen Reactions    Lipitor [atorvastatin]      Pain/cramping    Pcn [penicillins] Hives    Pravastatin      Pain/cramping    Simvastatin     Statins-hmg-coa reductase inhibitors      Medication List with Changes/Refills   Current Medications    ALLOPURINOL (ZYLOPRIM) 100 MG TABLET    Take 1 tablet (100 mg total) by mouth every morning.    AMLODIPINE (NORVASC) 5 MG TABLET    Take 5 mg by mouth every morning.     ASPIRIN (ECOTRIN) 81 MG EC TABLET    Take 1 tablet (81 mg total) by mouth once daily.    DICYCLOMINE (BENTYL) 10 MG CAPSULE    Take 10 mg by mouth 2 (two) times daily as needed.    ESOMEPRAZOLE (NEXIUM) 20 MG CAPSULE    TAKE 1 CAPSULE EVERY DAY  BEFORE BREAKFAST.    EVOLOCUMAB (REPATHA SURECLICK) 140 MG/ML PNIJ     Inject 1 mL into the skin.    FINASTERIDE (PROSCAR) 5 MG TABLET    Take 1 tablet (5 mg total) by mouth every evening.    HYDROCHLOROTHIAZIDE (HYDRODIURIL) 25 MG TABLET    TAKE 1 TABLET EVERY MORNING    TAMSULOSIN (FLOMAX) 0.4 MG CAP    TAKE 1 CAPSULE ONE TIME DAILY IN THE EVENING   Changed and/or Refilled Medications    Modified Medication Previous Medication    CHOLECALCIFEROL, VITAMIN D3, (VITAMIN D3) 50 MCG (2,000 UNIT) TAB cholecalciferol, vitamin D3, (VITAMIN D3) 50 mcg (2,000 unit) Tab       Take 1 tablet (2,000 Units total) by mouth once daily.    Take 1 tablet (2,000 Units total) by mouth once daily.       Problem List:  Patient Active Problem List   Diagnosis    Mixed hyperlipidemia    Prediabetes    Stage 3a chronic kidney disease    Benign prostatic hyperplasia with urinary obstruction    Polyarticular osteoarthritis    Personal history of urinary calculi    PAD (peripheral artery disease)    Other reduced mobility    Occlusion and stenosis of bilateral carotid arteries    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    Hypertensive chronic kidney disease with stage 1 through stage 4 chronic kidney disease, or unspecified chronic kidney disease    Gastro-esophageal reflux disease without esophagitis    Chronic obstructive pulmonary disease, unspecified    Chronic low back pain    Carpal tunnel syndrome, bilateral upper limbs    Coronary artery disease involving native coronary artery of native heart without angina pectoris    PVC (premature ventricular contraction)    Statin intolerance    S/P laparoscopic cholecystectomy    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    ED (erectile dysfunction) of non-organic origin    BMI 28.0-28.9,adult    History of COVID-19         CARE TEAM:    Patient Care Team:  Paresh Escobedo MD as PCP - General (Internal Medicine)  Belinda Rinaldi LPN as Licensed Practical Nurse  Edy Varela MD  (Cardiology)  Roebrt Mendieta MD (Inactive) (Pain Medicine)  Dami Noonan II, MD as Consulting Physician (Gastroenterology)  Gama Martinez MD as Consulting Physician (Urology)  Duane Christy MD as Consulting Physician (Ophthalmology)         REVIEW OF SYSTEMS:    Review of Systems   Constitutional:  Negative for appetite change, chills, diaphoresis, fatigue, fever and unexpected weight change.   HENT:  Negative for congestion, drooling, ear discharge, ear pain, facial swelling, hearing loss, nosebleeds, postnasal drip, rhinorrhea, sinus pain, sneezing, sore throat, tinnitus, trouble swallowing and voice change.    Eyes:  Negative for pain, discharge, redness, itching and visual disturbance.   Respiratory:  Negative for cough, choking, chest tightness, shortness of breath, wheezing and stridor.    Cardiovascular:  Negative for chest pain, palpitations and leg swelling.   Gastrointestinal:  Negative for abdominal distention, abdominal pain, blood in stool, constipation, diarrhea, nausea and vomiting.   Endocrine: Negative for cold intolerance, heat intolerance, polydipsia, polyphagia and polyuria.   Genitourinary:  Negative for difficulty urinating, dysuria, flank pain, frequency, hematuria and urgency.   Musculoskeletal:  Positive for arthralgias. Negative for back pain, gait problem, joint swelling, myalgias, neck pain and neck stiffness.   Skin:  Negative for color change, pallor, rash and wound.   Allergic/Immunologic: Negative for environmental allergies, food allergies and immunocompromised state.   Neurological:  Negative for dizziness, tremors, seizures, syncope, speech difficulty, weakness, light-headedness, numbness and headaches.   Hematological:  Negative for adenopathy. Does not bruise/bleed easily.   Psychiatric/Behavioral:  Negative for agitation, behavioral problems, confusion, decreased concentration, dysphoric mood, hallucinations, sleep disturbance and suicidal ideas. The  "patient is not nervous/anxious.        PHYSICAL EXAM:    Vitals:    05/30/23 0836   BP: 103/61   Pulse: (!) 58   Temp: 97.9 °F (36.6 °C)     Weight: 83.3 kg (183 lb 12.1 oz)   Height: 5' 7.01" (170.2 cm)   Body mass index is 28.77 kg/m².  Vitals:    05/30/23 0836   BP: 103/61   Pulse: (!) 58   Temp: 97.9 °F (36.6 °C)   TempSrc: Temporal   Weight: 83.3 kg (183 lb 12.1 oz)   Height: 5' 7.01" (1.702 m)   PainSc: 0-No pain          Physical Exam  Vitals and nursing note reviewed.   Constitutional:       General: He is not in acute distress.     Appearance: He is not ill-appearing, toxic-appearing or diaphoretic.      Comments: Patient is overweight.    Patient is alert, awake and oriented X 3.  Patient is comfortable, cooperative and in no apparent distress.     HENT:      Head: Normocephalic and atraumatic.      Right Ear: Tympanic membrane, ear canal and external ear normal. There is no impacted cerumen.      Left Ear: Tympanic membrane, ear canal and external ear normal. There is no impacted cerumen.      Nose: Nose normal. No congestion or rhinorrhea.      Mouth/Throat:      Mouth: Mucous membranes are moist.      Pharynx: Oropharynx is clear. No oropharyngeal exudate or posterior oropharyngeal erythema.   Eyes:      General: No scleral icterus.        Right eye: No discharge.         Left eye: No discharge.      Extraocular Movements: Extraocular movements intact.      Conjunctiva/sclera: Conjunctivae normal.      Pupils: Pupils are equal, round, and reactive to light.   Cardiovascular:      Rate and Rhythm: Regular rhythm. Bradycardia present.      Pulses: Normal pulses.      Heart sounds: Murmur heard.     No friction rub. No gallop.   Pulmonary:      Effort: Pulmonary effort is normal. No respiratory distress.      Breath sounds: Normal breath sounds. No stridor. No wheezing, rhonchi or rales.   Chest:      Chest wall: No tenderness.   Abdominal:      General: Bowel sounds are normal. There is no distension.     "  Palpations: Abdomen is soft. There is no mass.      Tenderness: There is no abdominal tenderness. There is no guarding or rebound.      Hernia: No hernia is present.   Musculoskeletal:         General: No swelling, tenderness, deformity or signs of injury. Normal range of motion.      Cervical back: Normal range of motion and neck supple. No rigidity.      Right lower leg: No edema.      Left lower leg: No edema.      Comments: Crepitations knees.   Lymphadenopathy:      Cervical: No cervical adenopathy.   Skin:     General: Skin is warm and dry.      Capillary Refill: Capillary refill takes less than 2 seconds.      Coloration: Skin is not jaundiced or pale.      Findings: No bruising, erythema, lesion or rash.   Neurological:      General: No focal deficit present.      Mental Status: He is alert and oriented to person, place, and time.      Cranial Nerves: No cranial nerve deficit.      Sensory: No sensory deficit.      Motor: No weakness.      Coordination: Coordination normal.      Deep Tendon Reflexes: Reflexes normal.   Psychiatric:         Mood and Affect: Mood normal.         Behavior: Behavior normal.         Thought Content: Thought content normal.         Judgment: Judgment normal.          Labs:  Discussed with patient.    Lab Results   Component Value Date    GLU 90 05/24/2023     05/24/2023    K 4.0 05/24/2023     05/24/2023    CO2 29 05/24/2023    BUN 21 05/24/2023    CREATININE 1.27 (H) 05/24/2023    CALCIUM 9.1 05/24/2023    PROT 6.5 05/24/2023    ALBUMIN 4.1 05/24/2023    BILITOT 0.8 05/24/2023    AST 18 05/24/2023    ALT 10 05/24/2023    ESTGFRAFRICA 70 05/11/2022    EGFRNONAA 60 05/11/2022     Lab Results   Component Value Date    WBC 5.0 05/24/2023    RBC 4.47 05/24/2023    HGB 14.1 05/24/2023    HCT 40.7 05/24/2023    MCV 91.1 05/24/2023    RDW 13.0 05/24/2023     05/24/2023      Lab Results   Component Value Date    CHOL 131 05/24/2023    TRIG 40 05/24/2023    TRIG 40  09/05/2019    HDL 64 05/24/2023    HDL 58 09/05/2019    LDLCALC 56 05/24/2023     Lab Results   Component Value Date    TSH 1.90 05/11/2022     Lab Results   Component Value Date    HGBA1C 5.2 05/24/2023    HGBA1C 5.5 09/05/2019      No components found for: MICROALBUMIN/CREATININE    ASSESSMENT & PLAN:          *. Routine general medical examination at a health care facility    1. Hypertensive chronic kidney disease with stage 1 through stage 4 chronic kidney disease, or unspecified chronic kidney disease  The current medical regimen is effective;  continue present plan and medications.  2. Mixed hyperlipidemia  The current medical regimen is effective;  continue present plan and medications.  3. Coronary artery disease involving native coronary artery of native heart without angina pectoris  Continue antiplatelets-anticoagulation and statin to decrease chances of progression, in addition to diet, exercise, and weight management.  4. Occlusion and stenosis of bilateral carotid arteries  Continue antiplatelets-anticoagulation and statin to decrease chances of progression, in addition to diet, exercise, and weight management.  5. PAD (peripheral artery disease)  Continue antiplatelets-anticoagulation and statin to decrease chances of progression, in addition to diet, exercise, and weight management.  6. PVC (premature ventricular contraction)   In sinus rhythm currently  7. Prediabetes  Blood sugar is in the range of prediabetes.  In simple words, blood sugar is elevated more than the upper limit of normal, and less than that of Diabetes Mellitus.   Healthy diet, exercise and weight management are essential, to prevent or decrease chances of progression to f clinical Diabetes Mellitus.  8. Gastro-esophageal reflux disease without esophagitis   Asymptomatic currently  9. Stage 3a chronic kidney disease   Stable, seen by nephrologist recently  10. BMI 28.0-28.9,adult   Healthy diet, exercise, and weight monitoring are  essential for weight management.    Weight loss was discussed.  Ultimate goal is BMI below 25.   11. Chronic obstructive pulmonary disease, unspecified COPD type   Doing well currently, on no medications, no recent exace  12. Benign prostatic hyperplasia with urinary obstruction  Ions.The current medical regimen is effective;  continue present plan and medications.  13. ED (erectile dysfunction) of non-organic origin    14. Personal history of urinary calculi    15. Other reduced mobility    16. Statin intolerance          No orders of the defined types were placed in this encounter.     Follow up in about 3 months (around 8/30/2023), or if symptoms worsen or fail to improve. or sooner as needed.    Patient was counseled and questions and concerns were addressed.    Please note:  Parts of this report were done using a dictation software, voice to text, and sometimes the text contains some uncorrected words or sentences that are missed during revision.

## 2023-05-30 NOTE — ACP (ADVANCE CARE PLANNING)
"  Advance Care Planning/ ACP was discussed with patient face-to-face.    I initiated the discussion with this patient about ADVANCE CARE PLANNING/ ACP.   The patient was nopt accompanied by any family member or friend.    The concept of Advance Care Planning/ACP was explained, and patient reverberated understanding.  The patient was informed that participation in this discussion about ACP is absolutely voluntary, and is not mandatory.    Topics explains and discussed: Advance Directive/ " Medical Living Will", Health Care Proxy/ "Health Care Durable Power of , and Do Not Resuscitate/ Do not Intubate.   A package of Educational  material, Forms, and Templates, was given to the patient, as well as online resources addresses.  Patient was informed that 'patients have the right to change their minds at any time".  Patients can call or come in person to the office for help and/ or for questions that may arise, or for help in filling the template.     At the end of today's visit, patient:       - has the intention of discussing advance care planning with the family first, then to decide.    If the patient did complete an Advance Directive, a copy will be provided to be included in the patient's records.      The Education material that was given to the patient is including:  [place] ADVANCE DIRECTIVE PLANNING FOR HEALTH CARE DECISIONS.  & General information about advance care planning in writing      Time spent was less than 30 minutes,  16+ minutes.    "

## 2023-07-06 RX ORDER — HYDROGEN PEROXIDE 3 %
SOLUTION, NON-ORAL MISCELLANEOUS
Qty: 90 CAPSULE | Refills: 2 | Status: SHIPPED | OUTPATIENT
Start: 2023-07-06

## 2023-09-20 DIAGNOSIS — N40.0 BPH WITHOUT URINARY OBSTRUCTION: ICD-10-CM

## 2023-09-20 RX ORDER — FINASTERIDE 5 MG/1
5 TABLET, FILM COATED ORAL NIGHTLY
Qty: 90 TABLET | Refills: 3 | OUTPATIENT
Start: 2023-09-20

## 2023-11-26 DIAGNOSIS — N40.0 BPH WITHOUT URINARY OBSTRUCTION: ICD-10-CM

## 2023-11-27 RX ORDER — TAMSULOSIN HYDROCHLORIDE 0.4 MG/1
CAPSULE ORAL
Qty: 90 CAPSULE | Refills: 3 | OUTPATIENT
Start: 2023-11-27

## 2023-12-19 ENCOUNTER — PATIENT OUTREACH (OUTPATIENT)
Dept: ADMINISTRATIVE | Facility: HOSPITAL | Age: 83
End: 2023-12-19
Payer: MEDICARE

## 2024-01-26 ENCOUNTER — TELEPHONE (OUTPATIENT)
Dept: UROLOGY | Facility: CLINIC | Age: 84
End: 2024-01-26
Payer: MEDICARE

## 2024-01-26 DIAGNOSIS — N40.0 BPH WITHOUT URINARY OBSTRUCTION: Primary | ICD-10-CM

## 2024-01-26 NOTE — TELEPHONE ENCOUNTER
----- Message from Kat Fuentes MA sent at 1/26/2024 10:39 AM CST -----  Pt said he wants PSA done at [facility] . Can you put order in ? I will call him back when the order is in and set up a PSA F/U visit with you . His 12 fu was 11/23 he said he didn't know.

## 2024-01-31 DIAGNOSIS — N40.0 BPH WITHOUT URINARY OBSTRUCTION: ICD-10-CM

## 2024-01-31 RX ORDER — FINASTERIDE 5 MG/1
5 TABLET, FILM COATED ORAL NIGHTLY
Qty: 90 TABLET | Refills: 3 | Status: SHIPPED | OUTPATIENT
Start: 2024-01-31

## 2024-02-08 ENCOUNTER — OFFICE VISIT (OUTPATIENT)
Dept: UROLOGY | Facility: CLINIC | Age: 84
End: 2024-02-08
Payer: MEDICARE

## 2024-02-08 VITALS — BODY MASS INDEX: 28.88 KG/M2 | WEIGHT: 184.44 LBS

## 2024-02-08 DIAGNOSIS — R35.1 NOCTURIA MORE THAN TWICE PER NIGHT: ICD-10-CM

## 2024-02-08 DIAGNOSIS — R33.9 INCOMPLETE EMPTYING OF BLADDER: ICD-10-CM

## 2024-02-08 DIAGNOSIS — N40.0 BPH WITHOUT URINARY OBSTRUCTION: Primary | ICD-10-CM

## 2024-02-08 PROCEDURE — 1101F PT FALLS ASSESS-DOCD LE1/YR: CPT | Mod: HCNC,CPTII,S$GLB, | Performed by: UROLOGY

## 2024-02-08 PROCEDURE — 1157F ADVNC CARE PLAN IN RCRD: CPT | Mod: HCNC,CPTII,S$GLB, | Performed by: UROLOGY

## 2024-02-08 PROCEDURE — 1160F RVW MEDS BY RX/DR IN RCRD: CPT | Mod: HCNC,CPTII,S$GLB, | Performed by: UROLOGY

## 2024-02-08 PROCEDURE — 3288F FALL RISK ASSESSMENT DOCD: CPT | Mod: HCNC,CPTII,S$GLB, | Performed by: UROLOGY

## 2024-02-08 PROCEDURE — 1159F MED LIST DOCD IN RCRD: CPT | Mod: HCNC,CPTII,S$GLB, | Performed by: UROLOGY

## 2024-02-08 PROCEDURE — 99999 PR PBB SHADOW E&M-EST. PATIENT-LVL III: CPT | Mod: PBBFAC,HCNC,, | Performed by: UROLOGY

## 2024-02-08 PROCEDURE — 1126F AMNT PAIN NOTED NONE PRSNT: CPT | Mod: HCNC,CPTII,S$GLB, | Performed by: UROLOGY

## 2024-02-08 PROCEDURE — 99213 OFFICE O/P EST LOW 20 MIN: CPT | Mod: HCNC,S$GLB,, | Performed by: UROLOGY

## 2024-02-08 NOTE — PROGRESS NOTES
Subjective:       Patient ID: Geronimo Gu is a 83 y.o. male The patient's last visit with me was on 11/21/2022.   Chief Complaint:   No chief complaint on file.          History of Present Illness  Benign Prostatic Hypertrophy  Patient complains of lower urinary tract symptoms. He reports nocturia one time a night. He denies straining, urgency and weak stream. Patient states symptoms are of mild severity. Onset of symptoms was several years ago and was gradual in onset. He has no personal history and no family history of prostate cancer. He reports a history of no complicating symptoms. He denies flank pain, gross hematuria, kidney stones and recurrent UTI.  He is currently taking Flomax and Proscar.    02/08/2024  He feels well.  He is taking Flomax and Proscar.    Kidney Stones  He had a left proximal ureteral stone that was removed in August 2017.  His stent was removed in September.  He is back with an ultrasound.  He denies flank pain or hematuria or fever.        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    Hypertensive chronic kidney disease with stage 1 through stage 4 chronic kidney disease, or unspecified chronic kidney disease    Gastro-esophageal reflux disease without esophagitis    Chronic obstructive pulmonary disease, unspecified    Chronic low back pain    Carpal tunnel syndrome, bilateral upper limbs    Coronary artery disease involving native coronary artery of native heart without angina pectoris    PVC (premature ventricular contraction)    Statin intolerance    S/P laparoscopic  cholecystectomy    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  Review of patient's allergies indicates:   Allergen Reactions    Lipitor [atorvastatin]      Pain/cramping    Pcn [penicillins] Hives    Pravastatin      Pain/cramping    Simvastatin     Statins-hmg-coa reductase inhibitors      Current Outpatient Medications   Medication Sig    allopurinoL (ZYLOPRIM) 100 MG tablet Take 1 tablet (100 mg total) by mouth every morning.    amLODIPine (NORVASC) 5 MG tablet Take 5 mg by mouth every morning.     aspirin (ECOTRIN) 81 MG EC tablet Take 1 tablet (81 mg total) by mouth once daily. (Patient taking differently: Take 81 mg by mouth every evening.)    cholecalciferol, vitamin D3, (VITAMIN D3) 50 mcg (2,000 unit) Tab Take 1 tablet (2,000 Units total) by mouth once daily.    dicyclomine (BENTYL) 10 MG capsule Take 10 mg by mouth 2 (two) times daily as needed.    esomeprazole (NEXIUM) 20 MG capsule TAKE 1 CAPSULE EVERY DAY BEFORE BREAKFAST    evolocumab (REPATHA SURECLICK) 140 mg/mL PnIj Inject 1 mL into the skin.    finasteride (PROSCAR) 5 mg tablet Take 1 tablet (5 mg total) by mouth every evening.    hydroCHLOROthiazide (HYDRODIURIL) 25 MG tablet TAKE 1 TABLET EVERY MORNING    tamsulosin (FLOMAX) 0.4 mg Cap TAKE 1 CAPSULE ONE TIME DAILY IN THE EVENING     No current facility-administered medications for this visit.       Review of Systems   Constitutional:  Negative for activity change, fatigue, fever and unexpected weight change.   HENT:  Negative for congestion.    Eyes:  Negative for redness.   Respiratory:  Negative for chest tightness and shortness of breath.    Cardiovascular:  Negative for chest pain and leg swelling.   Gastrointestinal:  Negative for abdominal pain, constipation, diarrhea, nausea and vomiting.   Genitourinary:  Negative for dysuria,  flank pain, frequency, hematuria, penile pain, penile swelling, scrotal swelling, testicular pain and urgency.   Musculoskeletal:  Negative for arthralgias and back pain.   Neurological:  Negative for dizziness and light-headedness.   Psychiatric/Behavioral:  Negative for behavioral problems and confusion. The patient is not nervous/anxious.    All other systems reviewed and are negative.      Objective:      Vitals:    02/08/24 1012   Weight: 83.7 kg (184 lb 6.6 oz)         Physical Exam    Urine dipstick shows negative for all components.  Micro exam: negative for WBC's or RBC's.    2/2024  PSA  0.63 (1.64)    7/5/2022  PSA 0.54 (1.08)    Component Ref Range & Units 10 d ago    PROSTATE SPECIFIC ANTIGEN, SCR - QUEST < OR = 4.00 ng/mL 0.56    Comment: The total PSA value from this assay system is   standardized against the WHO standard. The test   result will be approximately 20% lower when compared   to the equimolar-standardized total PSA ([place]   [place]). Comparison of serial PSA results should be   interpreted with this fact in mind.       This test was performed using the Siemens   chemiluminescent method. Values obtained from   different assay methods cannot be used   interchangeably. PSA levels, regardless of   value, should not be interpreted as absolute   evidence of the presence or absence of disease.    Resulting Agency  [facility]           Narrative  Performed by: [employer] [facility]  FASTING:YES     FASTING: YES      Specimen Collected: 11/11/22 08:04 Last Resulted: 11/12/22 05:59             Assessment:       1. BPH without urinary obstruction    2. Nocturia more than twice per night    3. Incomplete emptying of bladder              Plan:       1. BPH without urinary obstruction  Doing well  - tamsulosin (FLOMAX) 0.4 mg Cap; TAKE 1 CAPSULE ONE TIME DAILY IN THE EVENING  Dispense: 90 capsule; Refill: 3  - finasteride (PROSCAR) 5 mg tablet; Take 1 tablet (5 mg total) by mouth  every evening.  Dispense: 90 tablet; Refill: 3    2. Nocturia more than twice per night  Limit evening fluids    3. Incomplete emptying of bladder  stable               Follow up in about 1 year (around 2/8/2025) for Follow up Established, Review PSA.

## 2024-04-22 DIAGNOSIS — N40.0 BPH WITHOUT URINARY OBSTRUCTION: ICD-10-CM

## 2024-04-22 RX ORDER — TAMSULOSIN HYDROCHLORIDE 0.4 MG/1
CAPSULE ORAL
Qty: 90 CAPSULE | Refills: 3 | Status: SHIPPED | OUTPATIENT
Start: 2024-04-22

## 2024-04-22 NOTE — TELEPHONE ENCOUNTER
----- Message from Su Yun sent at 4/22/2024  1:47 PM CDT -----  Regarding: Refill Request  Type: RX Refill Request      Who Called:  self       Refill or New Rx: refill       RX Name and Strength: tamsulosin (FLOMAX) 0.4 mg Cap      Preferred Pharmacy with phone number:      [facility] - [place], [place] - [address]          Would the patient rather a call back or a response via [facility]? Call       Best Call Back Number: .[phone]

## 2024-11-20 DIAGNOSIS — N40.0 BPH WITHOUT URINARY OBSTRUCTION: ICD-10-CM

## 2024-11-20 RX ORDER — FINASTERIDE 5 MG/1
5 TABLET, FILM COATED ORAL NIGHTLY
Qty: 90 TABLET | Refills: 3 | Status: SHIPPED | OUTPATIENT
Start: 2024-11-20

## 2025-02-01 DIAGNOSIS — N40.0 BPH WITHOUT URINARY OBSTRUCTION: ICD-10-CM

## 2025-02-03 RX ORDER — TAMSULOSIN HYDROCHLORIDE 0.4 MG/1
CAPSULE ORAL
Qty: 90 CAPSULE | Refills: 3 | OUTPATIENT
Start: 2025-02-03

## 2025-02-10 ENCOUNTER — OFFICE VISIT (OUTPATIENT)
Dept: UROLOGY | Facility: CLINIC | Age: 85
End: 2025-02-10
Payer: MEDICARE

## 2025-02-10 VITALS — BODY MASS INDEX: 28.74 KG/M2 | WEIGHT: 183.56 LBS

## 2025-02-10 DIAGNOSIS — N40.0 BPH WITHOUT URINARY OBSTRUCTION: ICD-10-CM

## 2025-02-10 PROCEDURE — 1159F MED LIST DOCD IN RCRD: CPT | Mod: HCNC,CPTII,S$GLB, | Performed by: UROLOGY

## 2025-02-10 PROCEDURE — 99213 OFFICE O/P EST LOW 20 MIN: CPT | Mod: HCNC,S$GLB,, | Performed by: UROLOGY

## 2025-02-10 PROCEDURE — 3288F FALL RISK ASSESSMENT DOCD: CPT | Mod: HCNC,CPTII,S$GLB, | Performed by: UROLOGY

## 2025-02-10 PROCEDURE — 1157F ADVNC CARE PLAN IN RCRD: CPT | Mod: HCNC,CPTII,S$GLB, | Performed by: UROLOGY

## 2025-02-10 PROCEDURE — 1125F AMNT PAIN NOTED PAIN PRSNT: CPT | Mod: HCNC,CPTII,S$GLB, | Performed by: UROLOGY

## 2025-02-10 PROCEDURE — 1160F RVW MEDS BY RX/DR IN RCRD: CPT | Mod: HCNC,CPTII,S$GLB, | Performed by: UROLOGY

## 2025-02-10 PROCEDURE — 99999 PR PBB SHADOW E&M-EST. PATIENT-LVL III: CPT | Mod: PBBFAC,HCNC,, | Performed by: UROLOGY

## 2025-02-10 PROCEDURE — 1101F PT FALLS ASSESS-DOCD LE1/YR: CPT | Mod: HCNC,CPTII,S$GLB, | Performed by: UROLOGY

## 2025-02-10 RX ORDER — TAMSULOSIN HYDROCHLORIDE 0.4 MG/1
CAPSULE ORAL
Qty: 90 CAPSULE | Refills: 3 | Status: SHIPPED | OUTPATIENT
Start: 2025-02-10

## 2025-02-10 RX ORDER — FINASTERIDE 5 MG/1
5 TABLET, FILM COATED ORAL NIGHTLY
Qty: 90 TABLET | Refills: 3 | Status: SHIPPED | OUTPATIENT
Start: 2025-02-10

## 2025-02-10 NOTE — PROGRESS NOTES
Subjective:       Patient ID: Geronimo Gu is a 84 y.o. male The patient's last visit with me was on 2/8/2024.   Chief Complaint:   Chief Complaint   Patient presents with    Annual Exam           History of Present Illness  Benign Prostatic Hypertrophy  Patient complains of lower urinary tract symptoms. He reports nocturia one time a night. He denies straining, urgency and weak stream. Patient states symptoms are of mild severity. Onset of symptoms was several years ago and was gradual in onset. He has no personal history and no family history of prostate cancer. He reports a history of no complicating symptoms. He denies flank pain, gross hematuria, kidney stones and recurrent UTI.  He is currently taking Flomax and Proscar.    2/8/2024  He feels well.  He is taking Flomax and Proscar.    02/10/2025  He is doing well with Flomax and Proscar.      Kidney Stones  He had a left proximal ureteral stone that was removed in August 2017.  His stent was removed in September.  He is back with an ultrasound.  He denies flank pain or hematuria or fever.        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    Hypertensive chronic kidney disease with stage 1 through stage 4 chronic kidney disease, or unspecified chronic kidney disease    Gastro-esophageal reflux disease without esophagitis    Chronic obstructive pulmonary disease, unspecified    Chronic low back pain    Carpal tunnel syndrome, bilateral upper limbs    Coronary artery disease involving native coronary artery of native heart without angina pectoris     PVC (premature ventricular contraction)    Statin intolerance    S/P laparoscopic cholecystectomy    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  Review of patient's allergies indicates:   Allergen Reactions    Lipitor [atorvastatin]      Pain/cramping    Pcn [penicillins] Hives    Pravastatin      Pain/cramping    Simvastatin     Statins-hmg-coa reductase inhibitors      Current Outpatient Medications   Medication Sig    allopurinoL (ZYLOPRIM) 100 MG tablet Take 1 tablet (100 mg total) by mouth every morning.    amLODIPine (NORVASC) 5 MG tablet Take 5 mg by mouth every morning.     aspirin (ECOTRIN) 81 MG EC tablet Take 1 tablet (81 mg total) by mouth once daily. (Patient taking differently: Take 81 mg by mouth every evening.)    cholecalciferol, vitamin D3, (VITAMIN D3) 50 mcg (2,000 unit) Tab Take 1 tablet (2,000 Units total) by mouth once daily.    dicyclomine (BENTYL) 10 MG capsule Take 10 mg by mouth 2 (two) times daily as needed.    esomeprazole (NEXIUM) 20 MG capsule TAKE 1 CAPSULE EVERY DAY BEFORE BREAKFAST    evolocumab (REPATHA SURECLICK) 140 mg/mL PnIj Inject 1 mL into the skin.    finasteride (PROSCAR) 5 mg tablet Take 1 tablet (5 mg total) by mouth every evening.    hydroCHLOROthiazide (HYDRODIURIL) 25 MG tablet TAKE 1 TABLET EVERY MORNING    tamsulosin (FLOMAX) 0.4 mg Cap TAKE 1 CAPSULE ONE TIME DAILY IN THE EVENING     No current facility-administered medications for this visit.       Review of Systems   Constitutional:  Negative for activity change, fatigue, fever and unexpected weight change.   HENT:  Negative for congestion.    Eyes:  Negative for redness.   Respiratory:  Negative for chest tightness and shortness of breath.    Cardiovascular:  Negative for chest pain and leg swelling.   Gastrointestinal:  Negative for abdominal pain,  constipation, diarrhea, nausea and vomiting.   Genitourinary:  Negative for dysuria, flank pain, frequency, hematuria, penile pain, penile swelling, scrotal swelling, testicular pain and urgency.   Musculoskeletal:  Negative for arthralgias and back pain.   Neurological:  Negative for dizziness and light-headedness.   Psychiatric/Behavioral:  Negative for behavioral problems and confusion. The patient is not nervous/anxious.    All other systems reviewed and are negative.      Objective:      Vitals:    02/10/25 1418   Weight: 83.3 kg (183 lb 8.5 oz)           Physical Exam  Vitals and nursing note reviewed.   Constitutional:       Appearance: He is well-developed.   HENT:      Head: Normocephalic.   Eyes:      Conjunctiva/sclera: Conjunctivae normal.   Neck:      Thyroid: No thyromegaly.      Trachea: No tracheal deviation.   Cardiovascular:      Rate and Rhythm: Normal rate.      Heart sounds: Normal heart sounds.   Pulmonary:      Effort: Pulmonary effort is normal. No respiratory distress.      Breath sounds: Normal breath sounds. No wheezing.   Abdominal:      General: Bowel sounds are normal.      Palpations: Abdomen is soft.      Tenderness: There is no abdominal tenderness. There is no rebound.      Hernia: No hernia is present.   Musculoskeletal:         General: No tenderness. Normal range of motion.      Cervical back: Normal range of motion and neck supple.   Lymphadenopathy:      Cervical: No cervical adenopathy.   Skin:     General: Skin is warm and dry.      Findings: No erythema or rash.   Neurological:      Mental Status: He is alert and oriented to person, place, and time.   Psychiatric:         Behavior: Behavior normal.         Thought Content: Thought content normal.         Judgment: Judgment normal.         Urine dipstick shows negative for all components.  Micro exam: negative for WBC's or RBC's.    2/2024  PSA  0.63 (1.64)    7/5/2022  PSA 0.54 (1.08)    Component Ref Range & Units 10 d ago     PROSTATE SPECIFIC ANTIGEN, SCR - [facility] < OR = 4.00 ng/mL 0.56    Comment: The total PSA value from this assay system is   standardized against the WHO standard. The test   result will be approximately 20% lower when compared   to the equimolar-standardized total PSA ([place]). Comparison of serial PSA results should be   interpreted with this fact in mind.       This test was performed using the Siemens   chemiluminescent method. Values obtained from   different assay methods cannot be used   interchangeably. PSA levels, regardless of   value, should not be interpreted as absolute   evidence of the presence or absence of disease.    [facility]           Narrative  Performed by: [facility]  FASTING:YES     FASTING: YES      Specimen Collected: 11/11/22 08:04 Last Resulted: 11/12/22 05:59             Assessment:       1. BPH without urinary obstruction                Plan:       1. BPH without urinary obstruction  Doing well  - tamsulosin (FLOMAX) 0.4 mg Cap; TAKE 1 CAPSULE ONE TIME DAILY IN THE EVENING  Dispense: 90 capsule; Refill: 3  - finasteride (PROSCAR) 5 mg tablet; Take 1 tablet (5 mg total) by mouth every evening.  Dispense: 90 tablet; Refill: 3    2. Nocturia more than twice per night  Limit evening fluids    3. Incomplete emptying of bladder  stable               Follow up in about 1 year (around 2/10/2026) for Follow up Established.

## (undated) DEVICE — BLANKET UPPER BODY 78.7X29.9IN

## (undated) DEVICE — CATH URTRL OPEN END STR TIP 5F

## (undated) DEVICE — CABLE LASER FIBER 200 MICRON

## (undated) DEVICE — SYR ONLY LUER LOCK 20CC

## (undated) DEVICE — GOWN B1 X-LG X-LONG

## (undated) DEVICE — CANISTER SUCTION 2 LTR

## (undated) DEVICE — SOL IRR NACL .9% 3000ML

## (undated) DEVICE — SEE MEDLINE ITEM 152532

## (undated) DEVICE — SUPPORT ULNA NERVE PROTECTOR

## (undated) DEVICE — BASKET STONE RETRV 1.9FRX120CM

## (undated) DEVICE — WIRE GUIDE .035 150CM.

## (undated) DEVICE — Device

## (undated) DEVICE — COVER SNAP KAP 26IN

## (undated) DEVICE — SHEATH ACC HD 12/14 FRX36CM

## (undated) DEVICE — CONTAINER SPECIMEN STRL 4OZ

## (undated) DEVICE — TRAY FOLEY 16FR INFECTION CONT

## (undated) DEVICE — GLOVE SURG BIOGEL LATEX SZ 7.5

## (undated) DEVICE — MAT QUICK 40X30 FLOOR FLUID LF